# Patient Record
Sex: MALE | Race: WHITE | NOT HISPANIC OR LATINO | Employment: FULL TIME | URBAN - METROPOLITAN AREA
[De-identification: names, ages, dates, MRNs, and addresses within clinical notes are randomized per-mention and may not be internally consistent; named-entity substitution may affect disease eponyms.]

---

## 2017-01-06 ENCOUNTER — COMMUNICATION - HEALTHEAST (OUTPATIENT)
Dept: INTERNAL MEDICINE | Facility: CLINIC | Age: 57
End: 2017-01-06

## 2017-01-06 DIAGNOSIS — E11.9 TYPE 2 DIABETES MELLITUS (H): ICD-10-CM

## 2017-04-11 ENCOUNTER — COMMUNICATION - HEALTHEAST (OUTPATIENT)
Dept: INTERNAL MEDICINE | Facility: CLINIC | Age: 57
End: 2017-04-11

## 2017-04-11 DIAGNOSIS — E11.9 TYPE 2 DIABETES MELLITUS (H): ICD-10-CM

## 2017-04-18 ENCOUNTER — COMMUNICATION - HEALTHEAST (OUTPATIENT)
Dept: INTERNAL MEDICINE | Facility: CLINIC | Age: 57
End: 2017-04-18

## 2017-05-09 ENCOUNTER — OFFICE VISIT - HEALTHEAST (OUTPATIENT)
Dept: INTERNAL MEDICINE | Facility: CLINIC | Age: 57
End: 2017-05-09

## 2017-05-09 ENCOUNTER — RECORDS - HEALTHEAST (OUTPATIENT)
Dept: ADMINISTRATIVE | Facility: OTHER | Age: 57
End: 2017-05-09

## 2017-05-09 DIAGNOSIS — E11.9 DIABETES (H): ICD-10-CM

## 2017-05-09 DIAGNOSIS — F17.200 TOBACCO USE DISORDER: ICD-10-CM

## 2017-05-09 DIAGNOSIS — E11.9 TYPE 2 DIABETES MELLITUS (H): ICD-10-CM

## 2017-05-09 DIAGNOSIS — E87.5 HYPERKALEMIA: ICD-10-CM

## 2017-05-09 LAB — HBA1C MFR BLD: 9.9 % (ref 3.5–6)

## 2017-05-10 ENCOUNTER — COMMUNICATION - HEALTHEAST (OUTPATIENT)
Dept: INTERNAL MEDICINE | Facility: CLINIC | Age: 57
End: 2017-05-10

## 2017-08-04 ENCOUNTER — COMMUNICATION - HEALTHEAST (OUTPATIENT)
Dept: INTERNAL MEDICINE | Facility: CLINIC | Age: 57
End: 2017-08-04

## 2017-08-04 DIAGNOSIS — F41.9 ANXIETY: ICD-10-CM

## 2017-10-01 ENCOUNTER — COMMUNICATION - HEALTHEAST (OUTPATIENT)
Dept: INTERNAL MEDICINE | Facility: CLINIC | Age: 57
End: 2017-10-01

## 2017-10-01 DIAGNOSIS — F41.9 ANXIETY: ICD-10-CM

## 2017-10-16 ENCOUNTER — COMMUNICATION - HEALTHEAST (OUTPATIENT)
Dept: INTERNAL MEDICINE | Facility: CLINIC | Age: 57
End: 2017-10-16

## 2017-10-16 DIAGNOSIS — F41.9 ANXIETY: ICD-10-CM

## 2018-01-09 ENCOUNTER — COMMUNICATION - HEALTHEAST (OUTPATIENT)
Dept: INTERNAL MEDICINE | Facility: CLINIC | Age: 58
End: 2018-01-09

## 2018-01-09 DIAGNOSIS — F41.9 ANXIETY: ICD-10-CM

## 2018-01-23 ENCOUNTER — OFFICE VISIT - HEALTHEAST (OUTPATIENT)
Dept: INTERNAL MEDICINE | Facility: CLINIC | Age: 58
End: 2018-01-23

## 2018-01-23 DIAGNOSIS — Z71.6 ENCOUNTER FOR SMOKING CESSATION COUNSELING: ICD-10-CM

## 2018-01-23 DIAGNOSIS — Z51.81 MEDICATION MONITORING ENCOUNTER: ICD-10-CM

## 2018-01-23 DIAGNOSIS — F41.9 ANXIETY: ICD-10-CM

## 2018-01-23 DIAGNOSIS — F17.200 TOBACCO USE DISORDER: ICD-10-CM

## 2018-01-23 DIAGNOSIS — E78.5 HYPERLIPIDEMIA: ICD-10-CM

## 2018-01-23 DIAGNOSIS — E11.9 TYPE 2 DIABETES MELLITUS (H): ICD-10-CM

## 2018-01-23 DIAGNOSIS — F41.1 ANXIETY STATE: ICD-10-CM

## 2018-01-23 LAB
ALBUMIN SERPL-MCNC: 3.7 G/DL (ref 3.5–5)
ALP SERPL-CCNC: 89 U/L (ref 45–120)
ALT SERPL W P-5'-P-CCNC: 17 U/L (ref 0–45)
AMPHETAMINES UR QL SCN: NORMAL
ANION GAP SERPL CALCULATED.3IONS-SCNC: 10 MMOL/L (ref 5–18)
AST SERPL W P-5'-P-CCNC: 16 U/L (ref 0–40)
BARBITURATES UR QL: NORMAL
BENZODIAZ UR QL: NORMAL
BILIRUB SERPL-MCNC: 0.4 MG/DL (ref 0–1)
BUN SERPL-MCNC: 17 MG/DL (ref 8–22)
CALCIUM SERPL-MCNC: 8.9 MG/DL (ref 8.5–10.5)
CANNABINOIDS UR QL SCN: NORMAL
CHLORIDE BLD-SCNC: 107 MMOL/L (ref 98–107)
CHOLEST SERPL-MCNC: 124 MG/DL
CO2 SERPL-SCNC: 22 MMOL/L (ref 22–31)
COCAINE UR QL: NORMAL
CREAT SERPL-MCNC: 0.77 MG/DL (ref 0.7–1.3)
CREAT UR-MCNC: 111.2 MG/DL
CREAT UR-MCNC: 111.2 MG/DL
FASTING STATUS PATIENT QL REPORTED: ABNORMAL
GFR SERPL CREATININE-BSD FRML MDRD: >60 ML/MIN/1.73M2
GLUCOSE BLD-MCNC: 186 MG/DL (ref 70–125)
HDLC SERPL-MCNC: 44 MG/DL
LDLC SERPL CALC-MCNC: 28 MG/DL
MICROALBUMIN UR-MCNC: 0.87 MG/DL (ref 0–1.99)
MICROALBUMIN/CREAT UR: 7.8 MG/G
OPIATES UR QL SCN: NORMAL
OXYCODONE UR QL: NORMAL
PCP UR QL SCN: NORMAL
POTASSIUM BLD-SCNC: 4.3 MMOL/L (ref 3.5–5)
PROT SERPL-MCNC: 6.4 G/DL (ref 6–8)
SODIUM SERPL-SCNC: 139 MMOL/L (ref 136–145)
TRIGL SERPL-MCNC: 260 MG/DL

## 2018-01-24 ENCOUNTER — AMBULATORY - HEALTHEAST (OUTPATIENT)
Dept: LAB | Facility: CLINIC | Age: 58
End: 2018-01-24

## 2018-01-24 ENCOUNTER — COMMUNICATION - HEALTHEAST (OUTPATIENT)
Dept: LAB | Facility: CLINIC | Age: 58
End: 2018-01-24

## 2018-01-24 DIAGNOSIS — E11.9 TYPE 2 DIABETES MELLITUS (H): ICD-10-CM

## 2018-01-24 LAB — HBA1C MFR BLD: 7.7 % (ref 3.5–6)

## 2018-01-25 ENCOUNTER — COMMUNICATION - HEALTHEAST (OUTPATIENT)
Dept: INTERNAL MEDICINE | Facility: CLINIC | Age: 58
End: 2018-01-25

## 2018-04-04 ENCOUNTER — RECORDS - HEALTHEAST (OUTPATIENT)
Dept: ADMINISTRATIVE | Facility: OTHER | Age: 58
End: 2018-04-04

## 2018-04-06 ENCOUNTER — RECORDS - HEALTHEAST (OUTPATIENT)
Dept: ADMINISTRATIVE | Facility: OTHER | Age: 58
End: 2018-04-06

## 2018-04-27 ENCOUNTER — COMMUNICATION - HEALTHEAST (OUTPATIENT)
Dept: INTERNAL MEDICINE | Facility: CLINIC | Age: 58
End: 2018-04-27

## 2018-04-27 DIAGNOSIS — E11.9 TYPE 2 DIABETES MELLITUS (H): ICD-10-CM

## 2018-05-08 ENCOUNTER — COMMUNICATION - HEALTHEAST (OUTPATIENT)
Dept: FAMILY MEDICINE | Facility: CLINIC | Age: 58
End: 2018-05-08

## 2018-08-22 ENCOUNTER — COMMUNICATION - HEALTHEAST (OUTPATIENT)
Dept: INTERNAL MEDICINE | Facility: CLINIC | Age: 58
End: 2018-08-22

## 2018-09-05 ENCOUNTER — COMMUNICATION - HEALTHEAST (OUTPATIENT)
Dept: INTERNAL MEDICINE | Facility: CLINIC | Age: 58
End: 2018-09-05

## 2018-09-05 DIAGNOSIS — F41.9 ANXIETY: ICD-10-CM

## 2018-09-24 ENCOUNTER — OFFICE VISIT - HEALTHEAST (OUTPATIENT)
Dept: INTERNAL MEDICINE | Facility: CLINIC | Age: 58
End: 2018-09-24

## 2018-09-24 DIAGNOSIS — E78.5 HYPERLIPIDEMIA: ICD-10-CM

## 2018-09-24 DIAGNOSIS — Z12.11 COLON CANCER SCREENING: ICD-10-CM

## 2018-09-24 DIAGNOSIS — E11.9 TYPE 2 DIABETES MELLITUS (H): ICD-10-CM

## 2018-09-24 DIAGNOSIS — R19.7 DIARRHEA: ICD-10-CM

## 2018-09-24 DIAGNOSIS — F17.200 TOBACCO USE DISORDER: ICD-10-CM

## 2018-09-24 DIAGNOSIS — Z79.899 CONTROLLED SUBSTANCE AGREEMENT SIGNED: ICD-10-CM

## 2018-09-24 DIAGNOSIS — F41.1 ANXIETY STATE: ICD-10-CM

## 2018-09-24 LAB — HBA1C MFR BLD: 7.3 % (ref 3.5–6)

## 2018-09-24 RX ORDER — GLUCOSAMINE HCL/CHONDROITIN SU 500-400 MG
CAPSULE ORAL
Qty: 100 STRIP | Refills: 11 | Status: SHIPPED | OUTPATIENT
Start: 2018-09-24 | End: 2021-12-09

## 2018-09-26 ENCOUNTER — AMBULATORY - HEALTHEAST (OUTPATIENT)
Dept: INTERNAL MEDICINE | Facility: CLINIC | Age: 58
End: 2018-09-26

## 2018-09-26 ENCOUNTER — COMMUNICATION - HEALTHEAST (OUTPATIENT)
Dept: INTERNAL MEDICINE | Facility: CLINIC | Age: 58
End: 2018-09-26

## 2018-11-20 ENCOUNTER — COMMUNICATION - HEALTHEAST (OUTPATIENT)
Dept: PEDIATRICS | Facility: CLINIC | Age: 58
End: 2018-11-20

## 2018-12-27 ENCOUNTER — COMMUNICATION - HEALTHEAST (OUTPATIENT)
Dept: INTERNAL MEDICINE | Facility: CLINIC | Age: 58
End: 2018-12-27

## 2019-01-19 ENCOUNTER — COMMUNICATION - HEALTHEAST (OUTPATIENT)
Dept: INTERNAL MEDICINE | Facility: CLINIC | Age: 59
End: 2019-01-19

## 2019-01-19 DIAGNOSIS — E11.9 TYPE 2 DIABETES MELLITUS (H): ICD-10-CM

## 2019-03-13 ENCOUNTER — COMMUNICATION - HEALTHEAST (OUTPATIENT)
Dept: INTERNAL MEDICINE | Facility: CLINIC | Age: 59
End: 2019-03-13

## 2019-03-13 DIAGNOSIS — E11.9 TYPE 2 DIABETES MELLITUS (H): ICD-10-CM

## 2019-04-01 ENCOUNTER — AMBULATORY - HEALTHEAST (OUTPATIENT)
Dept: MULTI SPECIALTY CLINIC | Facility: CLINIC | Age: 59
End: 2019-04-01

## 2019-04-01 ENCOUNTER — OFFICE VISIT - HEALTHEAST (OUTPATIENT)
Dept: INTERNAL MEDICINE | Facility: CLINIC | Age: 59
End: 2019-04-01

## 2019-04-01 DIAGNOSIS — Z51.81 MEDICATION MONITORING ENCOUNTER: ICD-10-CM

## 2019-04-01 DIAGNOSIS — Z12.11 COLON CANCER SCREENING: ICD-10-CM

## 2019-04-01 DIAGNOSIS — E78.2 MIXED HYPERLIPIDEMIA: ICD-10-CM

## 2019-04-01 DIAGNOSIS — F41.1 ANXIETY STATE: ICD-10-CM

## 2019-04-01 DIAGNOSIS — F17.200 TOBACCO USE DISORDER: ICD-10-CM

## 2019-04-01 DIAGNOSIS — E11.65 TYPE 2 DIABETES MELLITUS WITH HYPERGLYCEMIA, WITHOUT LONG-TERM CURRENT USE OF INSULIN (H): ICD-10-CM

## 2019-04-01 DIAGNOSIS — J30.1 SEASONAL ALLERGIC RHINITIS DUE TO POLLEN: ICD-10-CM

## 2019-04-01 LAB
ALBUMIN SERPL-MCNC: 3.9 G/DL (ref 3.5–5)
ALP SERPL-CCNC: 100 U/L (ref 45–120)
ALT SERPL W P-5'-P-CCNC: 23 U/L (ref 0–45)
AMPHETAMINES UR QL SCN: NORMAL
ANION GAP SERPL CALCULATED.3IONS-SCNC: 12 MMOL/L (ref 5–18)
AST SERPL W P-5'-P-CCNC: 17 U/L (ref 0–40)
BARBITURATES UR QL: NORMAL
BENZODIAZ UR QL: NORMAL
BILIRUB SERPL-MCNC: 0.3 MG/DL (ref 0–1)
BUN SERPL-MCNC: 11 MG/DL (ref 8–22)
CALCIUM SERPL-MCNC: 9.4 MG/DL (ref 8.5–10.5)
CANNABINOIDS UR QL SCN: NORMAL
CHLORIDE BLD-SCNC: 106 MMOL/L (ref 98–107)
CHOLEST SERPL-MCNC: 211 MG/DL
CO2 SERPL-SCNC: 21 MMOL/L (ref 22–31)
COCAINE UR QL: NORMAL
CREAT SERPL-MCNC: 0.79 MG/DL (ref 0.7–1.3)
CREAT UR-MCNC: 105.1 MG/DL
CREAT UR-MCNC: 105.1 MG/DL
FASTING STATUS PATIENT QL REPORTED: NO
GFR SERPL CREATININE-BSD FRML MDRD: >60 ML/MIN/1.73M2
GLUCOSE BLD-MCNC: 216 MG/DL (ref 70–125)
HBA1C MFR BLD: 8 % (ref 3.5–6)
HDLC SERPL-MCNC: 51 MG/DL
LDLC SERPL CALC-MCNC: ABNORMAL MG/DL
MICROALBUMIN UR-MCNC: 1.11 MG/DL (ref 0–1.99)
MICROALBUMIN/CREAT UR: 10.6 MG/G
OPIATES UR QL SCN: NORMAL
OXYCODONE UR QL: NORMAL
PCP UR QL SCN: NORMAL
POTASSIUM BLD-SCNC: 4.5 MMOL/L (ref 3.5–5)
PROT SERPL-MCNC: 6.7 G/DL (ref 6–8)
RETINOPATHY: NORMAL
RETINOPATHY: NORMAL
SODIUM SERPL-SCNC: 139 MMOL/L (ref 136–145)
TRIGL SERPL-MCNC: 461 MG/DL

## 2019-04-01 RX ORDER — GLUCOSAMINE HCL/CHONDROITIN SU 500-400 MG
CAPSULE ORAL
Qty: 100 STRIP | Refills: 11 | Status: SHIPPED | OUTPATIENT
Start: 2019-04-01 | End: 2021-12-09

## 2019-04-04 ENCOUNTER — COMMUNICATION - HEALTHEAST (OUTPATIENT)
Dept: INTERNAL MEDICINE | Facility: CLINIC | Age: 59
End: 2019-04-04

## 2019-06-04 ENCOUNTER — RECORDS - HEALTHEAST (OUTPATIENT)
Dept: ADMINISTRATIVE | Facility: OTHER | Age: 59
End: 2019-06-04

## 2019-07-11 ENCOUNTER — COMMUNICATION - HEALTHEAST (OUTPATIENT)
Dept: INTERNAL MEDICINE | Facility: CLINIC | Age: 59
End: 2019-07-11

## 2019-07-11 DIAGNOSIS — E11.65 TYPE 2 DIABETES MELLITUS WITH HYPERGLYCEMIA, WITHOUT LONG-TERM CURRENT USE OF INSULIN (H): ICD-10-CM

## 2019-07-23 ENCOUNTER — COMMUNICATION - HEALTHEAST (OUTPATIENT)
Dept: INTERNAL MEDICINE | Facility: CLINIC | Age: 59
End: 2019-07-23

## 2019-07-23 DIAGNOSIS — F41.1 ANXIETY STATE: ICD-10-CM

## 2019-10-08 ENCOUNTER — OFFICE VISIT - HEALTHEAST (OUTPATIENT)
Dept: INTERNAL MEDICINE | Facility: CLINIC | Age: 59
End: 2019-10-08

## 2019-10-08 ENCOUNTER — COMMUNICATION - HEALTHEAST (OUTPATIENT)
Dept: INTERNAL MEDICINE | Facility: CLINIC | Age: 59
End: 2019-10-08

## 2019-10-08 DIAGNOSIS — F17.200 TOBACCO USE DISORDER: ICD-10-CM

## 2019-10-08 DIAGNOSIS — E11.9 TYPE 2 DIABETES MELLITUS WITHOUT COMPLICATION, WITHOUT LONG-TERM CURRENT USE OF INSULIN (H): ICD-10-CM

## 2019-10-08 DIAGNOSIS — H93.13 TINNITUS, BILATERAL: ICD-10-CM

## 2019-10-08 DIAGNOSIS — Z79.899 CONTROLLED SUBSTANCE AGREEMENT SIGNED: ICD-10-CM

## 2019-10-08 DIAGNOSIS — F41.1 ANXIETY STATE: ICD-10-CM

## 2019-10-08 LAB — HBA1C MFR BLD: 6.4 % (ref 3.5–6)

## 2019-10-22 ENCOUNTER — COMMUNICATION - HEALTHEAST (OUTPATIENT)
Dept: INTERNAL MEDICINE | Facility: CLINIC | Age: 59
End: 2019-10-22

## 2019-10-22 DIAGNOSIS — E11.65 TYPE 2 DIABETES MELLITUS WITH HYPERGLYCEMIA, WITHOUT LONG-TERM CURRENT USE OF INSULIN (H): ICD-10-CM

## 2019-11-27 ENCOUNTER — COMMUNICATION - HEALTHEAST (OUTPATIENT)
Dept: INTERNAL MEDICINE | Facility: CLINIC | Age: 59
End: 2019-11-27

## 2019-11-27 DIAGNOSIS — F41.1 ANXIETY STATE: ICD-10-CM

## 2020-02-26 ENCOUNTER — COMMUNICATION - HEALTHEAST (OUTPATIENT)
Dept: TELEHEALTH | Facility: CLINIC | Age: 60
End: 2020-02-26

## 2020-02-26 ENCOUNTER — OFFICE VISIT - HEALTHEAST (OUTPATIENT)
Dept: INTERNAL MEDICINE | Facility: CLINIC | Age: 60
End: 2020-02-26

## 2020-02-26 ENCOUNTER — SURGERY - HEALTHEAST (OUTPATIENT)
Dept: SURGERY | Facility: HOSPITAL | Age: 60
End: 2020-02-26

## 2020-02-26 ENCOUNTER — ANESTHESIA - HEALTHEAST (OUTPATIENT)
Dept: SURGERY | Facility: HOSPITAL | Age: 60
End: 2020-02-26

## 2020-02-26 DIAGNOSIS — L08.9 LOCAL INFECTION OF SKIN AND SUBCUTANEOUS TISSUE: ICD-10-CM

## 2020-02-26 ASSESSMENT — MIFFLIN-ST. JEOR
SCORE: 1726.63
SCORE: 1733.4

## 2020-03-09 ENCOUNTER — OFFICE VISIT - HEALTHEAST (OUTPATIENT)
Dept: INTERNAL MEDICINE | Facility: CLINIC | Age: 60
End: 2020-03-09

## 2020-03-09 DIAGNOSIS — M00.9 PYOGENIC ARTHRITIS OF RIGHT HAND, DUE TO UNSPECIFIED ORGANISM (H): ICD-10-CM

## 2020-03-09 DIAGNOSIS — Z09 HOSPITAL DISCHARGE FOLLOW-UP: ICD-10-CM
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
## 2020-03-09 DIAGNOSIS — S61.451A CAT BITE OF HAND, RIGHT, INITIAL ENCOUNTER: ICD-10-CM

## 2020-04-21 ENCOUNTER — COMMUNICATION - HEALTHEAST (OUTPATIENT)
Dept: INTERNAL MEDICINE | Facility: CLINIC | Age: 60
End: 2020-04-21

## 2020-04-21 DIAGNOSIS — E11.65 TYPE 2 DIABETES MELLITUS WITH HYPERGLYCEMIA, WITHOUT LONG-TERM CURRENT USE OF INSULIN (H): ICD-10-CM

## 2020-06-02 ENCOUNTER — COMMUNICATION - HEALTHEAST (OUTPATIENT)
Dept: INTERNAL MEDICINE | Facility: CLINIC | Age: 60
End: 2020-06-02

## 2020-06-02 DIAGNOSIS — F41.1 ANXIETY STATE: ICD-10-CM

## 2020-09-10 ENCOUNTER — COMMUNICATION - HEALTHEAST (OUTPATIENT)
Dept: INTERNAL MEDICINE | Facility: CLINIC | Age: 60
End: 2020-09-10

## 2020-09-10 DIAGNOSIS — F41.1 ANXIETY STATE: ICD-10-CM

## 2020-09-13 ENCOUNTER — COMMUNICATION - HEALTHEAST (OUTPATIENT)
Dept: INTERNAL MEDICINE | Facility: CLINIC | Age: 60
End: 2020-09-13

## 2020-09-13 DIAGNOSIS — F41.1 ANXIETY STATE: ICD-10-CM

## 2020-10-05 ENCOUNTER — OFFICE VISIT - HEALTHEAST (OUTPATIENT)
Dept: INTERNAL MEDICINE | Facility: CLINIC | Age: 60
End: 2020-10-05

## 2020-10-05 DIAGNOSIS — E78.5 HYPERLIPIDEMIA, UNSPECIFIED HYPERLIPIDEMIA TYPE: ICD-10-CM

## 2020-10-05 DIAGNOSIS — I10 ESSENTIAL HYPERTENSION, BENIGN: ICD-10-CM

## 2020-10-05 DIAGNOSIS — F17.200 TOBACCO USE DISORDER: ICD-10-CM

## 2020-10-05 DIAGNOSIS — F41.1 ANXIETY STATE: ICD-10-CM

## 2020-10-05 DIAGNOSIS — Z51.81 ENCOUNTER FOR THERAPEUTIC DRUG MONITORING: ICD-10-CM

## 2020-10-05 DIAGNOSIS — E11.65 TYPE 2 DIABETES MELLITUS WITH HYPERGLYCEMIA, WITHOUT LONG-TERM CURRENT USE OF INSULIN (H): ICD-10-CM

## 2020-10-05 LAB
ALT SERPL W P-5'-P-CCNC: 17 U/L (ref 0–45)
AMPHETAMINES UR QL SCN: NORMAL
BARBITURATES UR QL: NORMAL
BENZODIAZ UR QL: NORMAL
CANNABINOIDS UR QL SCN: NORMAL
CHOLEST SERPL-MCNC: 219 MG/DL
COCAINE UR QL: NORMAL
CREAT UR-MCNC: 182.5 MG/DL
CREAT UR-MCNC: 182.5 MG/DL
FASTING STATUS PATIENT QL REPORTED: NO
HBA1C MFR BLD: 6.7 %
HDLC SERPL-MCNC: 46 MG/DL
LDLC SERPL CALC-MCNC: 122 MG/DL
MICROALBUMIN UR-MCNC: 3.36 MG/DL (ref 0–1.99)
MICROALBUMIN/CREAT UR: 18.4 MG/G
OPIATES UR QL SCN: NORMAL
OXYCODONE UR QL: NORMAL
PCP UR QL SCN: NORMAL
TRIGL SERPL-MCNC: 256 MG/DL

## 2020-10-05 RX ORDER — ESCITALOPRAM OXALATE 20 MG/1
20 TABLET ORAL DAILY
Qty: 90 TABLET | Refills: 1 | Status: SHIPPED | OUTPATIENT
Start: 2020-10-05 | End: 2022-06-04

## 2020-10-06 ENCOUNTER — COMMUNICATION - HEALTHEAST (OUTPATIENT)
Dept: INTERNAL MEDICINE | Facility: CLINIC | Age: 60
End: 2020-10-06

## 2020-10-06 DIAGNOSIS — E78.5 HYPERLIPIDEMIA, UNSPECIFIED HYPERLIPIDEMIA TYPE: ICD-10-CM

## 2020-10-06 DIAGNOSIS — E11.29 TYPE 2 DIABETES MELLITUS WITH MICROALBUMINURIA, WITHOUT LONG-TERM CURRENT USE OF INSULIN (H): ICD-10-CM

## 2020-10-06 DIAGNOSIS — R80.9 TYPE 2 DIABETES MELLITUS WITH MICROALBUMINURIA, WITHOUT LONG-TERM CURRENT USE OF INSULIN (H): ICD-10-CM

## 2020-10-06 RX ORDER — LISINOPRIL 2.5 MG/1
2.5 TABLET ORAL DAILY
Qty: 90 TABLET | Refills: 3 | Status: SHIPPED | OUTPATIENT
Start: 2020-10-06 | End: 2021-10-11

## 2020-10-27 ENCOUNTER — COMMUNICATION - HEALTHEAST (OUTPATIENT)
Dept: INTERNAL MEDICINE | Facility: CLINIC | Age: 60
End: 2020-10-27

## 2020-10-27 DIAGNOSIS — E11.65 TYPE 2 DIABETES MELLITUS WITH HYPERGLYCEMIA, WITHOUT LONG-TERM CURRENT USE OF INSULIN (H): ICD-10-CM

## 2020-12-15 ENCOUNTER — COMMUNICATION - HEALTHEAST (OUTPATIENT)
Dept: INTERNAL MEDICINE | Facility: CLINIC | Age: 60
End: 2020-12-15

## 2020-12-15 DIAGNOSIS — F41.1 ANXIETY STATE: ICD-10-CM

## 2020-12-15 DIAGNOSIS — E11.65 TYPE 2 DIABETES MELLITUS WITH HYPERGLYCEMIA, WITHOUT LONG-TERM CURRENT USE OF INSULIN (H): ICD-10-CM

## 2020-12-18 RX ORDER — LORAZEPAM 0.5 MG/1
TABLET ORAL
Qty: 15 TABLET | Refills: 0 | Status: SHIPPED | OUTPATIENT
Start: 2020-12-18 | End: 2023-10-09

## 2020-12-19 ENCOUNTER — COMMUNICATION - HEALTHEAST (OUTPATIENT)
Dept: INTERNAL MEDICINE | Facility: CLINIC | Age: 60
End: 2020-12-19

## 2020-12-19 DIAGNOSIS — E11.65 TYPE 2 DIABETES MELLITUS WITH HYPERGLYCEMIA, WITHOUT LONG-TERM CURRENT USE OF INSULIN (H): ICD-10-CM

## 2021-05-03 ENCOUNTER — COMMUNICATION - HEALTHEAST (OUTPATIENT)
Dept: ADMINISTRATIVE | Facility: CLINIC | Age: 61
End: 2021-05-03

## 2021-05-03 DIAGNOSIS — E11.65 TYPE 2 DIABETES MELLITUS WITH HYPERGLYCEMIA, WITHOUT LONG-TERM CURRENT USE OF INSULIN (H): ICD-10-CM

## 2021-05-12 ENCOUNTER — OFFICE VISIT - HEALTHEAST (OUTPATIENT)
Dept: INTERNAL MEDICINE | Facility: CLINIC | Age: 61
End: 2021-05-12

## 2021-05-12 DIAGNOSIS — I10 ESSENTIAL HYPERTENSION, BENIGN: ICD-10-CM

## 2021-05-12 DIAGNOSIS — E11.29 TYPE 2 DIABETES MELLITUS WITH MICROALBUMINURIA, WITHOUT LONG-TERM CURRENT USE OF INSULIN (H): ICD-10-CM

## 2021-05-12 DIAGNOSIS — R80.9 TYPE 2 DIABETES MELLITUS WITH MICROALBUMINURIA, WITHOUT LONG-TERM CURRENT USE OF INSULIN (H): ICD-10-CM

## 2021-05-12 DIAGNOSIS — F17.200 TOBACCO USE DISORDER: ICD-10-CM

## 2021-05-12 DIAGNOSIS — E78.5 HYPERLIPIDEMIA, UNSPECIFIED HYPERLIPIDEMIA TYPE: ICD-10-CM

## 2021-05-12 LAB
ANION GAP SERPL CALCULATED.3IONS-SCNC: 13 MMOL/L (ref 5–18)
BUN SERPL-MCNC: 12 MG/DL (ref 8–22)
CALCIUM SERPL-MCNC: 8.9 MG/DL (ref 8.5–10.5)
CHLORIDE BLD-SCNC: 107 MMOL/L (ref 98–107)
CO2 SERPL-SCNC: 21 MMOL/L (ref 22–31)
CREAT SERPL-MCNC: 0.84 MG/DL (ref 0.7–1.3)
GFR SERPL CREATININE-BSD FRML MDRD: >60 ML/MIN/1.73M2
GLUCOSE BLD-MCNC: 124 MG/DL (ref 70–125)
HBA1C MFR BLD: 7.4 %
POTASSIUM BLD-SCNC: 4 MMOL/L (ref 3.5–5)
SODIUM SERPL-SCNC: 141 MMOL/L (ref 136–145)

## 2021-05-12 RX ORDER — GLIPIZIDE 10 MG/1
20 TABLET, FILM COATED, EXTENDED RELEASE ORAL DAILY
Qty: 180 TABLET | Refills: 1 | Status: SHIPPED | OUTPATIENT
Start: 2021-05-12 | End: 2021-12-06

## 2021-05-12 RX ORDER — ATORVASTATIN CALCIUM 20 MG/1
20 TABLET, FILM COATED ORAL DAILY
Qty: 90 TABLET | Refills: 3 | Status: SHIPPED | OUTPATIENT
Start: 2021-05-12 | End: 2021-10-11

## 2021-05-12 RX ORDER — NICOTINE 21 MG/24HR
1 PATCH, TRANSDERMAL 24 HOURS TRANSDERMAL EVERY 24 HOURS
Qty: 30 PATCH | Refills: 0 | Status: SHIPPED | OUTPATIENT
Start: 2021-05-12 | End: 2022-07-20

## 2021-05-12 ASSESSMENT — MIFFLIN-ST. JEOR: SCORE: 1715.26

## 2021-05-13 ENCOUNTER — COMMUNICATION - HEALTHEAST (OUTPATIENT)
Dept: INTERNAL MEDICINE | Facility: CLINIC | Age: 61
End: 2021-05-13

## 2021-05-13 DIAGNOSIS — E11.29 TYPE 2 DIABETES MELLITUS WITH MICROALBUMINURIA, WITHOUT LONG-TERM CURRENT USE OF INSULIN (H): ICD-10-CM

## 2021-05-13 DIAGNOSIS — R80.9 TYPE 2 DIABETES MELLITUS WITH MICROALBUMINURIA, WITHOUT LONG-TERM CURRENT USE OF INSULIN (H): ICD-10-CM

## 2021-05-18 ENCOUNTER — COMMUNICATION - HEALTHEAST (OUTPATIENT)
Dept: INTERNAL MEDICINE | Facility: CLINIC | Age: 61
End: 2021-05-18

## 2021-05-18 DIAGNOSIS — R80.9 TYPE 2 DIABETES MELLITUS WITH MICROALBUMINURIA, WITHOUT LONG-TERM CURRENT USE OF INSULIN (H): ICD-10-CM

## 2021-05-18 DIAGNOSIS — E11.29 TYPE 2 DIABETES MELLITUS WITH MICROALBUMINURIA, WITHOUT LONG-TERM CURRENT USE OF INSULIN (H): ICD-10-CM

## 2021-05-20 RX ORDER — DULAGLUTIDE 0.75 MG/.5ML
0.75 INJECTION, SOLUTION SUBCUTANEOUS
Qty: 2 ML | Refills: 5 | Status: SHIPPED | OUTPATIENT
Start: 2021-05-20 | End: 2021-11-16

## 2021-05-27 ENCOUNTER — RECORDS - HEALTHEAST (OUTPATIENT)
Dept: ADMINISTRATIVE | Facility: CLINIC | Age: 61
End: 2021-05-27

## 2021-05-27 VITALS
BODY MASS INDEX: 33.27 KG/M2 | SYSTOLIC BLOOD PRESSURE: 120 MMHG | HEIGHT: 67 IN | WEIGHT: 212 LBS | DIASTOLIC BLOOD PRESSURE: 74 MMHG | HEART RATE: 66 BPM

## 2021-05-27 NOTE — PATIENT INSTRUCTIONS - HE
Check with your insurance to see if Cologuard is covered      The new shingles shot (Shingrix) is 2 separate shots  by 2-6 months.    The cost out of pocket is around $390 for the 2 shots, so you might want to see if your insurance covers it or a portion of it prior to having it done.  Often by having it done at a pharmacy rather than a clinic, it can be cheaper for you. I recommend that you check on the cost through your insurance or talk to your pharmacist about the cost of the vaccine.     It is estimated that any person's risk of shingles over their lifetime is around 10-20%.    The old shingles vaccine (Zostavax) is about 50% effective, reducing your risk of shingles to about 5-10% over your lifetime.    The new shot is 90% effective, reducing your risk of shingles to about 1-2% over your lifetime.    Because the shot is strong, it is very common to have flu like symptoms for 2-3 days after the shot.  25-50% of patients will have fever, muscle aches or headache.

## 2021-05-27 NOTE — TELEPHONE ENCOUNTER
----- Message from KOTA Saunders sent at 4/2/2019  3:18 PM CDT -----  Call patient: his A1C is 8.0% now. I am going to call in Actos which we had discussed in his office visit. This is taken once per day. He should monitor for swelling in the lower legs and let me know if this occurs. Because his A1C has increased, I would prefer to see him back in 3 months rather than in 6 months-- please change his appointment. Electrolytes are normal. Kidney function is normal. Cholesterol is high but I suspect this is in part related to your blood sugars being higher. There is no excess protein in your urine test.

## 2021-05-27 NOTE — TELEPHONE ENCOUNTER
Left message to call back. Please relay Rosie's information below and r/s pt for 3 month follow up

## 2021-05-27 NOTE — PROGRESS NOTES
Internal Medicine Office Visit  [facility]  Patient Name: Remi Mathias  Patient Age: 59 y.o.  YOB: 1960  MRN: [id number]    Date of Visit: 2019  Reason for Office Visit:   Chief Complaint   Patient presents with     Follow-up     6 month f/u           Assessment / Plan / Medical Decision Makin. Type 2 diabetes mellitus with hyperglycemia, without long-term current use of insulin (H)  - A1C likely worse due to metformin intolerance and reduced dosing of this medication  - START: pioglitazone (ACTOS) 30 MG tablet; Take 1 tablet (30 mg total) by mouth daily.  Dispense: 90 tablet; Refill: 0  - DISCONTINUE: metformin d/t side effect     2. Anxiety, insomnia   - No changes  - UDS updated today  - LORazepam (ATIVAN) 0.5 MG tablet; Take 1 tablet (0.5 mg total) by mouth daily as needed for anxiety.  Dispense: 15 tablet; Refill: 0  - escitalopram oxalate (LEXAPRO) 20 MG tablet; Take 1 tablet (20 mg total) by mouth daily.  Dispense: 90 tablet; Refill: 1    4. Nicotine Dependence  - declines cessation information     5. Mixed hyperlipidemia  - Lipid Profile  - atorvastatin (LIPITOR) 40 MG tablet; Take 1 tablet (40 mg total) by mouth daily.  Dispense: 90 tablet; Refill: 3    6. Colon cancer screening  - Cologuard reviewed as an option today  - he is advised to check with insurance regarding coverage     7. Seasonal allergic rhinitis due to pollen  - fluticasone propionate (FLONASE) 50 mcg/actuation nasal spray; 1 spray into each nostril daily.  Dispense: 16 g; Refill: 2      - Shingrix vaccine recommended, he will check on cost     Health Maintenance Review  Health Maintenance   Topic Date Due     DIABETES OPHTHALMOLOGY EXAM  1968     ZOSTER VACCINES (1 of 2) 2008     TD 18+ HE  2019 (Originally 2019)     DIABETES HEMOGLOBIN A1C  10/01/2019     DIABETES FOLLOW-UP  10/01/2019     DIABETES FOOT EXAM  2020     DIABETES URINE  MICROALBUMIN  04/01/2020     ADVANCE DIRECTIVES DISCUSSED WITH PATIENT  10/25/2021     COLONOSCOPY  10/25/2026     INFLUENZA VACCINE RULE BASED  Completed     TDAP ADULT ONE TIME DOSE  Completed         I have discontinued Mahendra Mathias's metFORMIN and metFORMIN. I have also changed his blood glucose test, LORazepam, atorvastatin, and fluticasone propionate. Additionally, I am having him start on pioglitazone. Lastly, I am having him maintain his blood-glucose meter, blood glucose test, aspirin, glipiZIDE, and escitalopram oxalate.      HPI:  Remi Mathias is a 59 y.o. year old who presents to the office today for follow up of chronic medical conditions.     Diabetes was reviewed. From memory his readings have been between 140-160 both fasting and after eating. He is taking metformin every other day due to loose stools associated with the medication.     Smoking reviewed, he is smoking less than 1 pack per day and cutting back.     Allergic rhinitis is worse in the spring, he uses flonase for this.     Anxiety has been well controlled with escitalopram, no concerns. He takes lorazepam sparingly as needed for anxiety.     Review of Systems- pertinent positive in bold:  A 10-point ROS is negative except as stated in HPI       Current Scheduled Meds:  Outpatient Encounter Medications as of 4/1/2019   Medication Sig Dispense Refill     aspirin 81 MG EC tablet Take 81 mg by mouth daily.       atorvastatin (LIPITOR) 40 MG tablet Take 1 tablet (40 mg total) by mouth daily. 90 tablet 3     blood glucose test strips Check blood sugar twice daily. Dispense brand per patient's insurance at pharmacy discretion. 100 strip 11     blood glucose test strips Check 1-2 times per day. Dx:e11.9 100 strip 11     blood-glucose meter Misc Check blood sugar twice daily. Dispense meter per insurance/patient preference 1 each 0     escitalopram oxalate (LEXAPRO) 20 MG tablet Take 1 tablet (20 mg total) by mouth daily. 90 tablet 1      fluticasone propionate (FLONASE) 50 mcg/actuation nasal spray 1 spray into each nostril daily. 16 g 2     glipiZIDE (GLUCOTROL XL) 10 MG 24 hr tablet Take 2 tablets (20 mg total) by mouth daily. 180 tablet 1     LORazepam (ATIVAN) 0.5 MG tablet Take 1 tablet (0.5 mg total) by mouth daily as needed for anxiety. 15 tablet 0     pioglitazone (ACTOS) 30 MG tablet Take 1 tablet (30 mg total) by mouth daily. 90 tablet 0     [DISCONTINUED] atorvastatin (LIPITOR) 40 MG tablet TAKE ONE TABLET BY MOUTH ONCE DAILY 90 tablet 2     [DISCONTINUED] blood glucose test strips Check 1-2 times per day. 100 strip 3     [DISCONTINUED] escitalopram oxalate (LEXAPRO) 20 MG tablet Take 1 tablet (20 mg total) by mouth daily. 90 tablet 1     [DISCONTINUED] fluticasone (FLONASE) 50 mcg/actuation nasal spray 1 spray into each nostril daily. 16 g 2     [DISCONTINUED] glipiZIDE (GLUCOTROL XL) 10 MG 24 hr tablet Take 2 tablets (20 mg total) by mouth daily. 180 tablet 0     [DISCONTINUED] LORazepam (ATIVAN) 0.5 MG tablet TAKE ONE TABLET BY MOUTH ONCE DAILY AS NEEDED FOR ANXIETY (NEED  TO  BE  SEEN  FOR  FURTHER  REFILLS) 15 tablet 0     [DISCONTINUED] metFORMIN (GLUCOPHAGE-XR) 500 MG 24 hr tablet Take 2 tablets (1,000 mg total) by mouth 2 (two) times a day. 360 tablet 0     [DISCONTINUED] metFORMIN (GLUCOPHAGE-XR) 500 MG 24 hr tablet Take 2 tablets (1,000 mg total) by mouth 2 (two) times a day. 360 tablet 1     No facility-administered encounter medications on file as of 4/1/2019.      Past Medical History:   Diagnosis Date     Allergic rhinitis     Created by Conversion  Replacement Utility updated for latest IMO load     Anxiety     Created by Conversion  Replacement Utility updated for latest IMO load     Hyperlipidemia     Created by Conversion      Nicotine Dependence     Created by Conversion      Onychomycosis Of The Toenails     Created by Conversion      Type 2 diabetes mellitus (H)     Created by Conversion      Past Surgical  History:   Procedure Laterality Date     NC LAP,CHOLECYSTECTOMY      Description: Cholecystectomy Laparoscopic;  Recorded: 12/08/2009;     Social History     Tobacco Use     Smoking status: Current Every Day Smoker     Packs/day: 1.00     Smokeless tobacco: Never Used   Substance Use Topics     Alcohol use: No     Drug use: No       Objective / Physical Examination:  Vitals:    04/01/19 0906   BP: 120/74   Pulse: 82   Weight: 208 lb (94.3 kg)     Wt Readings from Last 3 Encounters:   04/01/19 208 lb (94.3 kg)   09/24/18 211 lb (95.7 kg)   01/23/18 204 lb (92.5 kg)     Body mass index is 33.32 kg/m .     General Appearance: Alert and oriented, cooperative, affect appropriate, speech clear, in no apparent distress  Lungs: Clear to auscultation bilaterally. Normal inspiratory and expiratory effort  Cardiovascular: Regular rate, normal S1, S2. No murmurs, rubs, or gallops  Extremities: DP pulses 2+ and equal throughout. No edema  Diabetic foot exam: no foot lesions or deformities. He does have ingrown toenails of the great toes without erythema/inflammation/infection   Left: Filament test present  Right: Filament test present        Orders Placed This Encounter   Procedures     Drugs of Abuse 1,Urine     Glycosylated Hemoglobin A1c     Microalbumin, Random Urine     Lipid Profile     Comprehensive Metabolic Panel     Cologuard   Followup: Return in about 6 months (around 10/1/2019). earlier if needed.      Rosie Dorado, CNP

## 2021-05-28 ENCOUNTER — RECORDS - HEALTHEAST (OUTPATIENT)
Dept: ADMINISTRATIVE | Facility: CLINIC | Age: 61
End: 2021-05-28

## 2021-05-30 NOTE — TELEPHONE ENCOUNTER
Controlled Substance Refill Request  Medication:   Requested Prescriptions     Pending Prescriptions Disp Refills     LORazepam (ATIVAN) 0.5 MG tablet [Pharmacy Med Name: LORAZEPAM 0.5MG     TAB] 15 tablet 0     Sig: TAKE 1 TABLET BY MOUTH ONCE DAILY AS NEEDED FOR ANXIETY     Date Last Fill: 4/1/19  Pharmacy: [id number]   Submit electronically to pharmacy  Controlled Substance Agreement on File:   Encounter-Level CSA Scan Date:    There are no encounter-level csa scan date.       Last office visit: Last office visit pertaining to requested medication was 4/1/19.

## 2021-05-30 NOTE — TELEPHONE ENCOUNTER
Refill Approved    Rx renewed per Medication Renewal Policy. Medication was last renewed on 4/2/19.    Christen Grimes, [facility] Triage/Med Refill 7/11/2019     Requested Prescriptions   Pending Prescriptions Disp Refills     pioglitazone (ACTOS) 30 MG tablet [Pharmacy Med Name: PIOGLITAZONE 30MG   TAB] 90 tablet 0     Sig: TAKE 1 TABLET BY MOUTH ONCE DAILY       Pioglitazone Protocol Passed - 7/11/2019  7:57 AM        Passed - Blood pressure in last 12 months     BP Readings from Last 1 Encounters:   04/01/19 120/74             Passed - LFT or AST or ALT in last 12 months     Albumin   Date Value Ref Range Status   04/01/2019 3.9 3.5 - 5.0 g/dL Final     Bilirubin, Total   Date Value Ref Range Status   04/01/2019 0.3 0.0 - 1.0 mg/dL Final     Alkaline Phosphatase   Date Value Ref Range Status   04/01/2019 100 45 - 120 U/L Final     AST   Date Value Ref Range Status   04/01/2019 17 0 - 40 U/L Final     ALT   Date Value Ref Range Status   04/01/2019 23 0 - 45 U/L Final     Protein, Total   Date Value Ref Range Status   04/01/2019 6.7 6.0 - 8.0 g/dL Final                Passed - Visit with PCP or prescribing provider visit in last 6 months      Last office visit with prescriber/PCP: 4/1/2019 OR same dept: 4/1/2019 Rosie Dorado FNP OR same specialty: 4/1/2019 Rosie Dorado FNP Last physical: Visit date not found Last MTM visit: Visit date not found         Next appt within 3 mo: Visit date not found  Next physical within 3 mo: Visit date not found  Prescriber OR PCP: KOTA East  Last diagnosis associated with med order: 1. Type 2 diabetes mellitus with hyperglycemia, without long-term current use of insulin (H)  - pioglitazone (ACTOS) 30 MG tablet [Pharmacy Med Name: PIOGLITAZONE 30MG   TAB]; TAKE 1 TABLET BY MOUTH ONCE DAILY  Dispense: 90 tablet; Refill: 0     If protocol passes may refill for 12 months if within 3 months of last provider visit (or a total of 15 months).           Passed -  A1C in last 6 months     Hemoglobin A1c   Date Value Ref Range Status   04/01/2019 8.0 (H) 3.5 - 6.0 % Final               Passed - Microalbumin in last year     Microalbumin, Random Urine   Date Value Ref Range Status   04/01/2019 1.11 0.00 - 1.99 mg/dL Final                  Passed - Serum creatinine in last year     Creatinine   Date Value Ref Range Status   04/01/2019 0.79 0.70 - 1.30 mg/dL Final

## 2021-05-31 VITALS — WEIGHT: 204 LBS | BODY MASS INDEX: 32.68 KG/M2

## 2021-05-31 VITALS — BODY MASS INDEX: 33.86 KG/M2 | WEIGHT: 211.4 LBS

## 2021-06-01 ENCOUNTER — COMMUNICATION - HEALTHEAST (OUTPATIENT)
Dept: SCHEDULING | Facility: CLINIC | Age: 61
End: 2021-06-01

## 2021-06-02 ENCOUNTER — RECORDS - HEALTHEAST (OUTPATIENT)
Dept: ADMINISTRATIVE | Facility: CLINIC | Age: 61
End: 2021-06-02

## 2021-06-02 VITALS — BODY MASS INDEX: 33.8 KG/M2 | WEIGHT: 211 LBS

## 2021-06-02 VITALS — BODY MASS INDEX: 33.32 KG/M2 | WEIGHT: 208 LBS

## 2021-06-02 NOTE — TELEPHONE ENCOUNTER
Refill Approved    Rx renewed per Medication Renewal Policy. Medication was last renewed on 4/1/19.    Christen Grimes, [facility] Triage/Med Refill 10/22/2019     Requested Prescriptions   Pending Prescriptions Disp Refills     glipiZIDE (GLUCOTROL XL) 10 MG 24 hr tablet [Pharmacy Med Name: GLIPIZIDE ER 10MG   TAB] 180 tablet 1     Sig: TAKE 2 TABLETS BY MOUTH ONCE DAILY       Oral Hypoglycemics Refill Protocol Passed - 10/22/2019 12:48 PM        Passed - Visit with PCP or prescribing provider visit in last 6 months       Last office visit with prescriber/PCP: 10/8/2019 OR same dept: 10/8/2019 Rosie Dorado FNP OR same specialty: 10/8/2019 Rosie Dorado FNP Last physical: Visit date not found Last MTM visit: Visit date not found         Next appt within 3 mo: Visit date not found  Next physical within 3 mo: Visit date not found  Prescriber OR PCP: KOTA East  Last diagnosis associated with med order: 1. Type 2 diabetes mellitus with hyperglycemia, without long-term current use of insulin (H)  - glipiZIDE (GLUCOTROL XL) 10 MG 24 hr tablet [Pharmacy Med Name: GLIPIZIDE ER 10MG   TAB]; TAKE 2 TABLETS BY MOUTH ONCE DAILY  Dispense: 180 tablet; Refill: 1     If protocol passes may refill for 12 months if within 3 months of last provider visit (or a total of 15 months).           Passed - A1C in last 6 months     Hemoglobin A1c   Date Value Ref Range Status   10/08/2019 6.4 (H) 3.5 - 6.0 % Final               Passed - Microalbumin in last year      Microalbumin, Random Urine   Date Value Ref Range Status   04/01/2019 1.11 0.00 - 1.99 mg/dL Final                  Passed - Blood pressure in last year     BP Readings from Last 1 Encounters:   10/08/19 124/74             Passed - Serum creatinine in last year     Creatinine   Date Value Ref Range Status   04/01/2019 0.79 0.70 - 1.30 mg/dL Final

## 2021-06-02 NOTE — PATIENT INSTRUCTIONS - HE
The new shingles shot (Shingrix) is 2 separate shots  by 2-6 months.    The cost out of pocket is around $390 for the 2 shots, so you might want to see if your insurance covers it or a portion of it prior to having it done.  Often by having it done at a pharmacy rather than a clinic, it can be cheaper for you. I recommend that you check on the cost through your insurance or talk to your pharmacist about the cost of the vaccine.     It is estimated that any person's risk of shingles over their lifetime is around 10-20%.    The old shingles vaccine (Zostavax) is about 50% effective, reducing your risk of shingles to about 5-10% over your lifetime.    The new shot is 90% effective, reducing your risk of shingles to about 1-2% over your lifetime.    Because the shot is strong, it is very common to have flu like symptoms for 2-3 days after the shot.  25-50% of patients will have fever, muscle aches or headache.

## 2021-06-02 NOTE — PROGRESS NOTES
Internal Medicine Office Visit  [facility]   Patient Name: Remi Mathias  Patient Age: 59 y.o.  YOB: 1960  MRN: [id number]    Date of Visit: 10/8/2019  Reason for Office Visit:   Chief Complaint   Patient presents with     Follow-up           Assessment / Plan / Medical Decision Makin. Type 2 diabetes mellitus without complication, without long-term current use of insulin (H)  - Glycosylated Hemoglobin A1c  - Continue current medications. Encouraged weight loss and regular exercise. Advised monitoring glucose at various times of the day rather than the same time each day     2. Nicotine Dependence  - Tobacco cessation advised. He was referred to [facility]. He can call for a prescription for nicotine patches if needed but he declined this today     3. Anxiety, insomnia   4. Controlled substance agreement signed, lorazepam 2018  - continue escitalopram and lorazepam as needed     5. Tinnitus, bilateral  - Ambulatory referral to Audiology    - He will do his tetanus booster at the next visit, declines to do this today     Health Maintenance Review  Health Maintenance   Topic Date Due     PREVENTIVE CARE VISIT  1960     DIABETES OPHTHALMOLOGY EXAM  1968     HIV SCREENING  1973     PNEUMOCOCCAL IMMUNIZATION 19-64 MEDIUM RISK (1 of 1 - PPSV23) 1977     ZOSTER VACCINES (1 of 2) 2008     TD 18+ HE  2019     INFLUENZA VACCINE RULE BASED (1) 2019     DIABETES FOOT EXAM  2020     DIABETES URINE MICROALBUMIN  2020     DIABETES HEMOGLOBIN A1C  2020     DIABETES FOLLOW-UP  2020     ADVANCE CARE PLANNING  10/25/2021     COLONOSCOPY  10/25/2026     TDAP ADULT ONE TIME DOSE  Completed     HEPATITIS C SCREENING  Discontinued         I am having Mahendra Mathias maintain his blood-glucose meter, blood glucose test, aspirin, blood glucose test, glipiZIDE, atorvastatin, escitalopram oxalate, fluticasone propionate,  pioglitazone, and LORazepam.      HPI:  Remi Mathias is a 59 y.o. year old who presents to the office today for follow-up.    We reviewed his history of diabetes.  He admits that he checks his glucose only about twice per week on average.  His blood sugars have improved since he began pioglitazone.  His a.m. reading was 107 today.  After eating it is typically around 127.  He denies any hypoglycemia.  He is due for an eye exam and has been putting this off.    He has a new complaint today regarding bilateral tinnitus.  It is worse in the right ear over the left.  He has some hearing loss that he has noticed particularly in the right ear.  No discharge or drainage.    Nicotine use was reviewed.  He typically smokes less than 1 pack/day.  He is interested in smoking cessation and has thought about using nicotine patches which he believes he has at home.    Review of Systems- pertinent positive in bold:  Constitutional: Fever, chills, night sweats, fainting, weight change, fatigue, seizures, dizziness, sleeping difficulties, loud snoring/pauses in breathing  Eyes: change in vision, blurred or double vision, redness/eye pain  Ears, nose, mouth, throat: change in hearing, ear pain, hoarseness, difficulty swallowing, sores in the mouth or throat  Respiratory: shortness of breath, cough, bloody sputum, wheezing  Cardiovascular: chest pain, palpitations           Current Scheduled Meds:  Outpatient Encounter Medications as of 10/8/2019   Medication Sig Dispense Refill     aspirin 81 MG EC tablet Take 81 mg by mouth daily.       atorvastatin (LIPITOR) 40 MG tablet Take 1 tablet (40 mg total) by mouth daily. 90 tablet 3     blood glucose test strips Check blood sugar twice daily. Dispense brand per patient's insurance at pharmacy discretion. 100 strip 11     blood glucose test strips Check 1-2 times per day. Dx:e11.9 100 strip 11     blood-glucose meter Misc Check blood sugar twice daily. Dispense meter per  insurance/patient preference 1 each 0     escitalopram oxalate (LEXAPRO) 20 MG tablet Take 1 tablet (20 mg total) by mouth daily. 90 tablet 1     fluticasone propionate (FLONASE) 50 mcg/actuation nasal spray 1 spray into each nostril daily. 16 g 2     glipiZIDE (GLUCOTROL XL) 10 MG 24 hr tablet Take 2 tablets (20 mg total) by mouth daily. 180 tablet 1     LORazepam (ATIVAN) 0.5 MG tablet TAKE 1 TABLET BY MOUTH ONCE DAILY AS NEEDED FOR ANXIETY 15 tablet 0     pioglitazone (ACTOS) 30 MG tablet TAKE 1 TABLET BY MOUTH ONCE DAILY 90 tablet 2     No facility-administered encounter medications on file as of 10/8/2019.          Past Medical History:   Diagnosis Date     Allergic rhinitis     Created by Conversion  Replacement Utility updated for latest IMO load     Anxiety     Created by Conversion  Replacement Utility updated for latest IMO load     Hyperlipidemia     Created by Conversion      Nicotine Dependence     Created by Conversion      Onychomycosis Of The Toenails     Created by Conversion      Type 2 diabetes mellitus (H)     Created by Conversion      Past Surgical History:   Procedure Laterality Date     DC LAP,CHOLECYSTECTOMY      Description: Cholecystectomy Laparoscopic;  Recorded: 12/08/2009;     Social History     Tobacco Use     Smoking status: Current Every Day Smoker     Packs/day: 1.00     Smokeless tobacco: Never Used   Substance Use Topics     Alcohol use: No     Drug use: No       Objective / Physical Examination:  Vitals:    10/08/19 0813   BP: 124/74   Pulse: 80   Weight: 211 lb (95.7 kg)     Wt Readings from Last 3 Encounters:   10/08/19 211 lb (95.7 kg)   04/01/19 208 lb (94.3 kg)   09/24/18 211 lb (95.7 kg)     Body mass index is 33.8 kg/m .     Constitutional: In no apparent distress  Respiratory: Clear to auscultation bilaterally. Normal inspiratory and expiratory effort  Cardiovascular: Regular rate and rhythm. No murmurs, rubs, or gallops. No edema. No carotid bruits.         Orders Placed  This Encounter   Procedures     Glycosylated Hemoglobin A1c     Ambulatory referral to Audiology   Followup: Return in about 6 months (around 4/8/2020) for Next scheduled follow up. earlier if needed.        Rosie Dorado, CNP

## 2021-06-03 VITALS
WEIGHT: 211 LBS | BODY MASS INDEX: 33.8 KG/M2 | DIASTOLIC BLOOD PRESSURE: 74 MMHG | SYSTOLIC BLOOD PRESSURE: 124 MMHG | HEART RATE: 80 BPM

## 2021-06-04 VITALS
WEIGHT: 214 LBS | BODY MASS INDEX: 33.52 KG/M2 | HEART RATE: 60 BPM | DIASTOLIC BLOOD PRESSURE: 66 MMHG | SYSTOLIC BLOOD PRESSURE: 124 MMHG | TEMPERATURE: 97.9 F

## 2021-06-04 VITALS — WEIGHT: 214.51 LBS | BODY MASS INDEX: 33.67 KG/M2 | HEIGHT: 67 IN

## 2021-06-05 VITALS — DIASTOLIC BLOOD PRESSURE: 72 MMHG | WEIGHT: 210 LBS | SYSTOLIC BLOOD PRESSURE: 122 MMHG | BODY MASS INDEX: 32.89 KG/M2

## 2021-06-06 NOTE — ANESTHESIA CARE TRANSFER NOTE
Last vitals:   Vitals:    02/26/20 1823   BP: 120/78   Pulse: 86   Resp: 16   Temp: 97.3 F   SpO2: 94 %     Patient's level of consciousness is drowsy  Spontaneous respirations: yes  Maintains airway independently: yes  Dentition unchanged: yes  Oropharynx: oropharynx clear of all foreign objects    QCDR Measures:  ASA# 20 - Surgical Safety Checklist: WHO surgical safety checklist completed prior to induction    PQRS# 430 - Adult PONV Prevention: 4558F - Pt received => 2 anti-emetic agents (different classes) preop & intraop  ASA# 8 - Peds PONV Prevention: NA - Not pediatric patient, not GA or 2 or more risk factors NOT present  PQRS# 424 - [place]-op Temp Management: 4559F - At least one body temp DOCUMENTED => 35.5C or 95.9F within required timeframe  PQRS# 426 - PACU Transfer Protocol: - Transfer of care checklist used  ASA# 14 - Acute Post-op Pain: ASA14B - Patient did NOT experience pain >= 7 out of 10

## 2021-06-06 NOTE — PROGRESS NOTES
Internal Medicine Office Visit  [facility]   Patient Name: Remi Mathias  Patient Age: 59 y.o.  YOB: 1960  MRN: [id number]    Date of Visit: 3/9/2020  Reason for Office Visit:   Chief Complaint   Patient presents with     Hospital Visit Follow Up           Assessment / Plan / Medical Decision Makin. Hospital discharge follow-up  2. Pyogenic arthritis of right hand, due to unspecified organism (H)  3. Cat bite of hand, right, initial encounter  - Inpatient notes reviewed  - Orthopedics requested to see patient back in 5-7 days following discharge but he is not yet scheduled. I will have him make an appointment prior to leaving the clinic, will set for the next 1-2 days  - He is advised to complete full course of antibiotics. May need an additional 1 week of treatment if he continues to have drainage. He will monitor for a fever   - Ambulatory referral to Orthopedics    4. Diabetes  - Continue with glipizide only and monitor home glucose readings  - Repeat A1C in 6 months         Health Maintenance Review  Health Maintenance   Topic Date Due     PREVENTIVE CARE VISIT  1960     DIABETIC EYE EXAM  1960     HIV SCREENING  1973     PNEUMOCOCCAL IMMUNIZATION 19-64 MEDIUM RISK (1 of 1 - PPSV23) 1977     ZOSTER VACCINES (1 of 2) 2008     TD 18+ HE  2019     DIABETIC FOOT EXAM  2020     LIPID  2020     MICROALBUMIN  2020     A1C  2020     BMP  2021     ADVANCE CARE PLANNING  10/25/2021     COLORECTAL CANCER SCREENING  10/25/2026     INFLUENZA VACCINE RULE BASED  Completed     TDAP ADULT ONE TIME DOSE  Completed     HEPATITIS C SCREENING  Discontinued         I am having Mahendra Mathias maintain his blood-glucose meter, blood glucose test, aspirin, blood glucose test, LORazepam, glipiZIDE, escitalopram oxalate, acetaminophen, senna-docusate, HYDROcodone-acetaminophen, amoxicillin-clavulanate, and fluticasone  propionate.      HPI:  Remi Mathias is a 59 y.o. year old who presents to the office today for follow-up of a recent hospitalization for right hand cellulitis after a cat bite. He was admitted on 2/26.  He was found to have second MCP joint septic arthritis and was taken to the OR for I&D of the right hand.  He was transitioned from IV Zosyn to Augmentin. He was discharged on 2/29 and told to follow-up with the surgeon in 5 to 7 days.  Wound care was continued with soaks and dressing changes until this visit.  He has not had any chills and does not have a thermometer to check his temperature but has not felt feverish.  There is sometimes a small amount of white or yellow-colored drainage that drains from the incisions. Swelling and pain has improved significantly. He continues with the soaks 3 times daily and dressing changes.  He is not scheduled with orthopedics at this point.  He continues aspirin or ibuprofen for pain and occasionally takes hydrocodone when needed.    Diabetes was reviewed.  He was taking both glipizide and Actos, Actos was discontinued due to risk of hypoglycemia with a last A1c of 6.2%. This was started initially when A1c was 8.0% April 2019.  He has not seen a big change in his glucose readings since discontinuing the medication.    Review of Systems- pertinent positive in bold:  Negative for fever, chills, rigors       Current Scheduled Meds:  Outpatient Encounter Medications as of 3/9/2020   Medication Sig Dispense Refill     acetaminophen (TYLENOL) 325 MG tablet Take 2 tablets (650 mg total) by mouth every 4 (four) hours as needed.  0     amoxicillin-clavulanate (AUGMENTIN) 875-125 mg per tablet Take 1 tablet by mouth 2 (two) times a day for 12 days. 24 tablet 0     aspirin 81 MG EC tablet Take 81 mg by mouth daily.       blood glucose test strips Check blood sugar twice daily. Dispense brand per patient's insurance at pharmacy discretion. 100 strip 11     blood glucose test strips  Check 1-2 times per day. Dx:e11.9 100 strip 11     blood-glucose meter Misc Check blood sugar twice daily. Dispense meter per insurance/patient preference 1 each 0     escitalopram oxalate (LEXAPRO) 20 MG tablet TAKE 1 TABLET BY MOUTH ONCE DAILY 90 tablet 1     fluticasone propionate (FLONASE) 50 mcg/actuation nasal spray 1 spray into each nostril daily as needed.       glipiZIDE (GLUCOTROL XL) 10 MG 24 hr tablet TAKE 2 TABLETS BY MOUTH ONCE DAILY 180 tablet 1     HYDROcodone-acetaminophen (NORCO) 7.5-325 mg per tablet Take 1 tablet by mouth every 6 (six) hours as needed. 15 tablet 0     LORazepam (ATIVAN) 0.5 MG tablet TAKE 1 TABLET BY MOUTH ONCE DAILY AS NEEDED FOR ANXIETY 15 tablet 0     senna-docusate (PERICOLACE) 8.6-50 mg tablet Take 1 tablet by mouth 2 (two) times a day as needed for constipation.  0     No facility-administered encounter medications on file as of 3/9/2020.        Past Medical History:   Diagnosis Date     Allergic rhinitis     Created by Conversion  Replacement Utility updated for latest IMO load     Anxiety     Created by Conversion  Replacement Utility updated for latest IMO load     Hyperlipidemia     Created by Conversion      Nicotine Dependence     Created by Conversion      Onychomycosis Of The Toenails     Created by Conversion      Type 2 diabetes mellitus (H)     Created by Conversion      Past Surgical History:   Procedure Laterality Date     INCISION AND DRAINAGE OF WOUND Right 2/26/2020    Procedure: Exploration and Arthrotomy of metacarpophalangeal  joint;  Surgeon: Jany Cote MD;  [place] [facility] [place]  Service: Orthopedics     IA LAP,CHOLECYSTECTOMY      Description: Cholecystectomy Laparoscopic;  Recorded: 12/08/2009;     Social History     Tobacco Use     Smoking status: Current Every Day Smoker     Packs/day: 1.00     Smokeless tobacco: Never Used   Substance Use Topics     Alcohol use: No     Drug use: No       Objective / Physical Examination:  Vitals:     03/09/20 1313   BP: 124/66   Pulse: 60   Temp: 97.9  F (36.6  C)   Weight: 214 lb (97.1 kg)     Wt Readings from Last 3 Encounters:   03/09/20 214 lb (97.1 kg)   02/26/20 214 lb 8.1 oz (97.3 kg)   10/08/19 211 lb (95.7 kg)     Body mass index is 33.52 kg/m .     Constitutional: In no apparent distress  MSK: right hand with sensation in all fingers. Able to flex and extend all fingers but limited ROM in the right index finger. Incision edges well approximated. There is a scant amount of purulent drainage on the bandage when he removes this but no surrounding erythema. He has swelling over the fingers, and dorsum of the hand.     Orders Placed This Encounter   Procedures     Ambulatory referral to Orthopedics   Followup: Return in about 6 months (around 9/9/2020) for Next scheduled follow up. earlier if needed.        Rosie Dorado, CNP

## 2021-06-06 NOTE — ANESTHESIA POSTPROCEDURE EVALUATION
Patient: Remi Mathias  Procedure(s):  Exploration and Arthrotomy of metacarpophalangeal  joint (Right)  Anesthesia type: regional    Patient location: Phase II Recovery  Last vitals:   Vitals Value Taken Time   /74 2/26/2020  6:33 PM   Temp 36.3  C (97.3  F) 2/26/2020  6:20 PM   Pulse 81 2/26/2020  6:40 PM   Resp 38 2/26/2020  6:40 PM   SpO2 94 % 2/26/2020  6:40 PM   Vitals shown include unvalidated device data.  Post vital signs: stable  Level of consciousness: awake and responds to simple questions  Post-anesthesia pain: pain controlled  Post-anesthesia nausea and vomiting: no  Pulmonary: unassisted, return to baseline  Cardiovascular: stable and blood pressure at baseline  Hydration: adequate  Anesthetic events: no    QCDR Measures:  ASA# 11 - [place]-op Cardiac Arrest: ASA11B - Patient did NOT experience unanticipated cardiac arrest  ASA# 12 - [place]-op Mortality Rate: ASA12B - Patient did NOT die  ASA# 13 - PACU Re-Intubation Rate: NA - No ETT / LMA used for case  ASA# 10 - Composite Anes Safety: ASA10A - No serious adverse event    Additional Notes:

## 2021-06-06 NOTE — PROGRESS NOTES
Saw patient for cat bite wound. Concern for significant deep tissue infection and septic arthritis of R hand.    Also he is not up to date with tetanus and we do not have tetanus immune globulin.    Advised patient go to the ED for imaging, abs, surgical intervention and likely immune globulin. He understands and will go.    Will not bill him for this visit.     Dr. Leiva

## 2021-06-06 NOTE — ANESTHESIA PROCEDURE NOTES
Peripheral Block    Patient location during procedure: pre-op  Start time: 2/26/2020 4:41 PM  End time: 2/26/2020 4:48 PM  post-op analgesia per surgeon order as noted in medical record  Staffing:  Performing  Anesthesiologist: Martine Funes MD  Preanesthetic Checklist  Completed: patient identified, site marked, risks, benefits, and alternatives discussed, timeout performed, consent obtained, airway assessed, oxygen available, suction available, emergency drugs available and hand hygiene performed  Peripheral Block  Block type: brachial plexus, supraclavicular  Prep: ChloraPrep  Patient position: sitting  Patient monitoring: cardiac monitor, continuous pulse oximetry, heart rate and blood pressure  Laterality: right  Injection technique: ultrasound guided    Ultrasound used to visualize needle placement in proximity to nerve being blocked: yes   US used to visualize anesthetic spread  Visualized anatomic structures normal  No Pathological Findings  Permanent ultrasound image captured for medical record  Sterile gel and probe cover used for ultrasound.  Needle  Needle type: Stimuplex   Needle gauge: 21 G  Needle length: 4 in  no peripheral nerve catheter placed  Assessment  Injection assessment: no difficulty with injection, negative aspiration for heme, no paresthesia on injection and incremental injection

## 2021-06-06 NOTE — ANESTHESIA PREPROCEDURE EVALUATION
Anesthesia Evaluation      No history of anesthetic complications     Airway    Pulmonary    (+) a smoker                         Cardiovascular   Exercise tolerance: > or = 4 METS  (-) hypertension, hypercholesterolemia   Neuro/Psych    (+) anxiety/panic attacks,     Endo/Other    (+) diabetes mellitus type 2, obesity (BMI 33),      GI/Hepatic/Renal       Other findings: Cat bite to R hand with pain and likely associated infection, s/f surgical exploration and likely I&D of the wound      Dental                         Anesthesia Plan  Planned anesthetic: MAC and peripheral nerve block  preop supraclav PNB per surgeon request   MAC - propofol gtt  Zofran 4mg for antiemesis  ASA 2     Anesthetic plan and risks discussed with: patient  Anesthesia plan special considerations: antiemetics,   Post-op plan: routine recovery

## 2021-06-07 NOTE — TELEPHONE ENCOUNTER
Lab Results   Component Value Date    HGBA1C 6.2 (H) 02/27/2020    HGBA1C 6.4 (H) 10/08/2019    HGBA1C 8.0 (H) 04/01/2019    HGBA1C 7.3 (H) 09/24/2018    HGBA1C 7.7 (H) 01/24/2018    HGBA1C 9.9 (H) 05/09/2017    HGBA1C 8.6 (H) 10/25/2016    HGBA1C 7.6 (H) 11/04/2014    HGBA1C 6.8 (H) 03/19/2013    HGBA1C 6.8 (H) 07/31/2012    HGBA1C 10.5 (H) 12/08/2011    HGBA1C 8.0 (H) 12/15/2010    HGBA1C 6.8 (H) 03/10/2010    HGBA1C 6.0 08/04/2009

## 2021-06-08 NOTE — TELEPHONE ENCOUNTER
Controlled Substance Refill Request  Medication Name:   Requested Prescriptions     Pending Prescriptions Disp Refills     LORazepam (ATIVAN) 0.5 MG tablet [Pharmacy Med Name: LORazepam 0.5 MG Oral Tablet] 15 tablet 0     Sig: TAKE 1 TABLET BY MOUTH ONCE DAILY AS NEEDED FOR ANXIETY     Date Last Fill: 7/23/19  Requested Pharmacy: [facility]  Submit electronically to pharmacy  Controlled Substance Agreement on file:   Encounter-Level CSA Scan Date - 09/24/2018:    Scan on 9/26/2018  4:14 PM        Last office visit:  3/9/20

## 2021-06-09 ENCOUNTER — RECORDS - HEALTHEAST (OUTPATIENT)
Dept: EDUCATION SERVICES | Facility: CLINIC | Age: 61
End: 2021-06-09

## 2021-06-10 NOTE — PROGRESS NOTES
Internal Medicine Office Visit  Patient Name: Remi Mathias  Patient Age: 57 y.o.  YOB: 1960  MRN: [id number]  ?  Date of Visit: 2017  Reason for Office Visit:   Chief Complaint   Patient presents with     Follow-up     DM med check no concerns       Assessment / Plan / Medical Decision Makin. Type 2 diabetes mellitus  2. Hyperkalemia  3. Diabetes  4. Nicotine Dependence  - Change Metformin to extended release Metformin 2000 mg daily with breakfast.  Anticipate that his blood sugar will improve with consistency in taking this medication.  - Smoking cessation strongly recommended.  He would like to do this on his own; declines further assistance at this time  - Anxiety symptoms well controlled at this time.  He continues to take Ativan 1 tablet per week, 2 with the most  - Reviewed options for colon cancer screening.  He is agreeable to doing the Cologuard stool test and this will be sent to him.      Health Maintenance Review  Health Maintenance   Topic Date Due     DIABETES FOLLOW-UP  1960     DIABETES FOOT EXAM  1968     DIABETES OPHTHALMOLOGY EXAM  1968     DIABETES URINE MICROALBUMIN  10/25/2017     DIABETES HEMOGLOBIN A1C  2017     TD 18+ HE  2019     ADVANCE DIRECTIVES DISCUSSED WITH PATIENT  10/25/2021     COLONOSCOPY  10/25/2026     INFLUENZA VACCINE RULE BASED  Completed     TDAP ADULT ONE TIME DOSE  Completed         I have discontinued Mr. Mathias's metFORMIN. I have also changed his escitalopram oxalate, LORazepam, and nortriptyline. Additionally, I am having him start on metFORMIN. Lastly, I am having him maintain his aspirin, glipiZIDE, atorvastatin, and blood glucose test.     HPI:   Encounter Diagnoses   Name Primary?     Type 2 diabetes mellitus Yes     Hyperkalemia      Diabetes      Nicotine Dependence       DM: fasting readings in the 140s, post-prandial 180s. Admits that he frequently forgets to take a second dose of metformin and  also has some stomach upset associated with the medication.  He frequently takes metformin 500 mg once daily due to the stomach upset.    We reviewed his history of tobacco use.  He would like to quit.  He is currently smoking 1 pack per day.  Declines any additional assistance with this at the present time.    Anxiety: He is currently taking X citalopram 20 mg daily.  He has Lorazepam 0.5 mg for use as needed, he typically takes 1 tablet per week, 2 at the most.  He takes nortriptyline at bedtime to help him sleep.    Colon cancer screening: declines colonoscopy. Would be interested in Cologuard stool test.       Review of Systems: Denies chest pain, polyuria, polydipsia.  No lower extremity numbness or tingling.      Current Scheduled Meds:  Outpatient Encounter Prescriptions as of 5/9/2017   Medication Sig Dispense Refill     aspirin 81 MG EC tablet Take 1 tablet (81 mg total) by mouth daily. 150 tablet 2     atorvastatin (LIPITOR) 40 MG tablet Take 1 tablet (40 mg total) by mouth daily. 90 tablet 3     blood glucose test strips Check 1-2 times per day. 100 strip 3     escitalopram oxalate (LEXAPRO) 20 MG tablet Take 1 tablet (20 mg total) by mouth daily. 30 tablet 2     glipiZIDE (GLUCOTROL) 5 MG tablet TAKE 1 TABLET (5 MG TOTAL) BY MOUTH DAILY. 90 tablet 3     LORazepam (ATIVAN) 0.5 MG tablet Take 1 tablet (0.5 mg total) by mouth daily as needed. 30 tablet 0     nortriptyline (PAMELOR) 10 MG capsule Take 3 capsules (30 mg total) by mouth at bedtime. 180 capsule 2     [DISCONTINUED] atorvastatin (LIPITOR) 40 MG tablet Take 1 tablet (40 mg total) by mouth daily. 90 tablet 3     [DISCONTINUED] blood glucose test strips Check 1-2 times per day. 100 strip 3     [DISCONTINUED] escitalopram oxalate (LEXAPRO) 20 MG tablet Take 1 tablet by mouth daily.       [DISCONTINUED] glipiZIDE (GLUCOTROL) 5 MG tablet TAKE 1 TABLET (5 MG TOTAL) BY MOUTH DAILY. 90 tablet 0     [DISCONTINUED] LORazepam (ATIVAN) 0.5 MG tablet Take 1  tablet by mouth daily as needed.       [DISCONTINUED] metFORMIN (GLUCOPHAGE) 1000 MG tablet Take 1 tablet (1,000 mg total) by mouth 2 (two) times a day with meals. 180 tablet 1     [DISCONTINUED] nortriptyline (PAMELOR) 10 MG capsule Take 3 capsules by mouth bedtime.       metFORMIN (GLUCOPHAGE-XR) 500 MG 24 hr tablet Take 4 tablets (2,000 mg total) by mouth daily with breakfast. 120 tablet 5     No facility-administered encounter medications on file as of 5/9/2017.      Past Medical History:   Diagnosis Date     Allergic rhinitis     Created by Conversion  Replacement Utility updated for latest IMO load     Anxiety     Created by Conversion  Replacement Utility updated for latest IMO load     Hyperlipidemia     Created by Conversion      Nicotine Dependence     Created by Conversion      Onychomycosis Of The Toenails     Created by Conversion      Type 2 diabetes mellitus     Created by Conversion      Past Surgical History:   Procedure Laterality Date     NH LAP,CHOLECYSTECTOMY      Description: Cholecystectomy Laparoscopic;  Recorded: 12/08/2009;     Social History   Substance Use Topics     Smoking status: Current Every Day Smoker     Packs/day: 1.00     Smokeless tobacco: None     Alcohol use No       Objective / Physical Examination:  Vitals:    05/09/17 0731   BP: 118/76   Patient Site: Left Arm   Patient Position: Sitting   Cuff Size: Adult Regular   Pulse: 93   Weight: 211 lb 6.4 oz (95.9 kg)     Wt Readings from Last 3 Encounters:   05/09/17 211 lb 6.4 oz (95.9 kg)   10/25/16 205 lb (93 kg)     Body mass index is 33.86 kg/(m^2).     General Appearance: Alert and oriented, cooperative, affect appropriate, speech clear, in no apparent distress  Neck: Supple, trachea midline. No carotid bruits   Lungs: Clear to auscultation bilaterally. Normal inspiratory and expiratory effort  Cardiovascular: Regular rate, normal S1, S2  Neuro: LE monofilament test- intact sensation     Orders Placed This Encounter    Procedures     Glycosylated Hemoglobin A1c     Potassium     Glycosylated Hemoglobin A1c   Followup: Return in about 3 months (around 8/9/2017) for Recheck. earlier if needed.      Rosie Dorado, CNP  [facility]

## 2021-06-11 NOTE — TELEPHONE ENCOUNTER
Please call patient -    ______________________________________________________________________     Home phone:  [phone] (home)     Cell phone:   Telephone Information:   Mobile [phone]       Other contacts:  Name Home Phone Work Phone Mobile Phone Relationship Lgl JESSICA Martinez [phone]   Spouse    DECLINED, PER PT    Declined      ______________________________________________________________________     He is due for follow up of his diabetes with Rosie Dorado NP.  Please help him to schedule.    Thomas Garcia MD  [facility]  9/11/2020, 10:59 PM

## 2021-06-11 NOTE — TELEPHONE ENCOUNTER
Called and lvm for patient stating refill was sent and he is due for an appointment.    Please assist in scheduling

## 2021-06-12 NOTE — TELEPHONE ENCOUNTER
Refill Approved    Rx renewed per Medication Renewal Policy. Medication was last renewed on 4/21/20.    Christen Grimes, [facility] Connection Triage/Med Refill 10/29/2020     Requested Prescriptions   Pending Prescriptions Disp Refills     glipiZIDE (GLUCOTROL XL) 10 MG 24 hr tablet [Pharmacy Med Name: glipiZIDE ER 10 MG Oral Tablet Extended Release 24 Hour] 180 tablet 0     Sig: Take 2 tablets by mouth once daily       Oral Hypoglycemics Refill Protocol Passed - 10/27/2020  5:41 PM        Passed - Visit with PCP or prescribing provider visit in last 6 months       Last office visit with prescriber/PCP: Visit date not found OR same dept: 10/5/2020 Rosie Dorado FNP OR same specialty: 10/5/2020 Rosie Dorado FNP Last physical: Visit date not found Last MTM visit: Visit date not found         Next appt within 3 mo: Visit date not found  Next physical within 3 mo: Visit date not found  Prescriber OR PCP: Thomas Garcia MD  Last diagnosis associated with med order: 1. Type 2 diabetes mellitus with hyperglycemia, without long-term current use of insulin (H)  - glipiZIDE (GLUCOTROL XL) 10 MG 24 hr tablet [Pharmacy Med Name: glipiZIDE ER 10 MG Oral Tablet Extended Release 24 Hour]; Take 2 tablets by mouth once daily  Dispense: 180 tablet; Refill: 0     If protocol passes may refill for 12 months if within 3 months of last provider visit (or a total of 15 months).           Passed - A1C in last 6 months     Hemoglobin A1c   Date Value Ref Range Status   10/05/2020 6.7 (H) <=5.6 % Final     Comment:     Normal <5.7% Prediabete 5.7-6.4% Diabletes 6.5% or higher - adopted from ADA consensus guidelines               Passed - Microalbumin in last year      Microalbumin, Random Urine   Date Value Ref Range Status   10/05/2020 3.36 (H) 0.00 - 1.99 mg/dL Final                  Passed - Blood pressure in last year     BP Readings from Last 1 Encounters:   10/05/20 122/72             Passed - Serum creatinine in last year      Creatinine   Date Value Ref Range Status   02/28/2020 0.79 0.70 - 1.30 mg/dL Final

## 2021-06-12 NOTE — PROGRESS NOTES
Internal Medicine Office Visit  [facility]   Patient Name: Remi Mathias  Patient Age: 60 y.o.  YOB: 1960  MRN: [id number]    Date of Visit: 10/5/2020  Reason for Office Visit:   Chief Complaint   Patient presents with     Medication Management           Assessment / Plan / Medical Decision Makin. Type 2 diabetes mellitus with hyperglycemia, without long-term current use of insulin (H)  Continue current glipizide only medication   - Glycosylated Hemoglobin A1c  - Microalbumin, Random Urine    2. Anxiety, insomnia   Encounter for therapeutic drug monitoring  - escitalopram oxalate (LEXAPRO) 20 MG tablet; Take 1 tablet (20 mg total) by mouth daily.  Dispense: 90 tablet; Refill: 1  - Lorazepam as needed   - Drugs of Abuse 1,Urine    3. Essential hypertension, benign  Stable     4. Nicotine Dependence  Not interested in cessation at this time    5. Hyperlipidemia, unspecified hyperlipidemia type  - not currently on statin, discontinued unintentionally after a hospitalization. Will plan to resume due to diagnosis of DM    - Lipid Profile  - ALT (SGPT)          Health Maintenance Review  Health Maintenance   Topic Date Due     PREVENTIVE CARE VISIT  1960     Pneumococcal Vaccine: Pediatrics (0 to 5 Years) and At-Risk Patients (6 to 64 Years) (1 of 1 - PPSV23) 1964     HIV SCREENING  1973     ZOSTER VACCINES (1 of 2) 2008     DIABETIC FOOT EXAM  2020     DIABETIC EYE EXAM  2020     BMP  2021     A1C  2021     LIPID  10/05/2021     MICROALBUMIN  10/05/2021     ADVANCE CARE PLANNING  10/25/2021     COLORECTAL CANCER SCREENING  10/25/2026     TD 18+ HE  10/05/2030     INFLUENZA VACCINE RULE BASED  Completed     TDAP ADULT ONE TIME DOSE  Completed     HEPATITIS C SCREENING  Discontinued     HEPATITIS B VACCINES  Discontinued         I have discontinued Mahendra Mathias's aspirin, senna-docusate, and HYDROcodone-acetaminophen. I  "have also changed his escitalopram oxalate. Additionally, I am having him maintain his blood-glucose meter, blood glucose test, blood glucose test, acetaminophen, fluticasone propionate, glipiZIDE, and LORazepam.      HPI:  Remi Mathias is a 60 y.o. year old who presents to the office today for follow up.     Diabetes reviewed, blood sugar today was 101.     Anxiety was reviewed, he continues escitalopram for anxiety which has been helpful for him for many years. Some occasional break through anxiety but not often. PRN lorazepam, last dose 2 weeks ago.     He has OA in the hands, lower back, and legs. Takes acetaminophen when needed.     Tobacco use was reviewed, smokes 1 pack per day. Not interested in cessation at this time, \"I know I need to\".     Review of Systems:  Respiratory: shortness of breath, cough, bloody sputum, wheezing  Cardiovascular: chest pain, palpitations   Gastrointestinal: abdominal pain, heartburn/indigestion, nausea/vomiting, change in appetite, change in bowel habits, constipation or diarrhea, rectal bleeding/dark stools, difficulty swallowing  Urinary: painful urination, frequent urination, urinary urgency/incontinence, blood in urine/dark urine, nocturia        Current Scheduled Meds:  Outpatient Encounter Medications as of 10/5/2020   Medication Sig Dispense Refill     acetaminophen (TYLENOL) 325 MG tablet Take 2 tablets (650 mg total) by mouth every 4 (four) hours as needed.  0     blood glucose test strips Check blood sugar twice daily. Dispense brand per patient's insurance at pharmacy discretion. 100 strip 11     blood glucose test strips Check 1-2 times per day. Dx:e11.9 100 strip 11     blood-glucose meter Misc Check blood sugar twice daily. Dispense meter per insurance/patient preference 1 each 0     escitalopram oxalate (LEXAPRO) 20 MG tablet Take 1 tablet (20 mg total) by mouth daily. 90 tablet 1     fluticasone propionate (FLONASE) 50 mcg/actuation nasal spray 1 spray " into each nostril daily as needed.       glipiZIDE (GLUCOTROL XL) 10 MG 24 hr tablet Take 2 tablets by mouth once daily 180 tablet 1     LORazepam (ATIVAN) 0.5 MG tablet TAKE 1 TABLET BY MOUTH ONCE DAILY AS NEEDED FOR ANXIETY 15 tablet 0     [DISCONTINUED] aspirin 81 MG EC tablet Take 81 mg by mouth daily.       [DISCONTINUED] escitalopram oxalate (LEXAPRO) 20 MG tablet Take 1 tablet by mouth once daily 90 tablet 0     [DISCONTINUED] HYDROcodone-acetaminophen (NORCO) 7.5-325 mg per tablet Take 1 tablet by mouth every 6 (six) hours as needed. 15 tablet 0     [DISCONTINUED] senna-docusate (PERICOLACE) 8.6-50 mg tablet Take 1 tablet by mouth 2 (two) times a day as needed for constipation.  0     No facility-administered encounter medications on file as of 10/5/2020.          Past Medical History:   Diagnosis Date     Allergic rhinitis     Created by Conversion  Replacement Utility updated for latest IMO load     Anxiety     Created by Conversion  Replacement Utility updated for latest IMO load     Hyperlipidemia     Created by Conversion      Nicotine Dependence     Created by Conversion      Onychomycosis Of The Toenails     Created by Conversion      Type 2 diabetes mellitus (H)     Created by Conversion      Past Surgical History:   Procedure Laterality Date     INCISION AND DRAINAGE OF WOUND Right 2/26/2020    Procedure: Exploration and Arthrotomy of metacarpophalangeal  joint;  Surgeon: Jany Cote MD;  Location: [facility];  Service: Orthopedics     FL LAP,CHOLECYSTECTOMY      Description: Cholecystectomy Laparoscopic;  Recorded: 12/08/2009;     Social History     Tobacco Use     Smoking status: Current Every Day Smoker     Packs/day: 1.00     Smokeless tobacco: Never Used   Substance Use Topics     Alcohol use: No     Drug use: No       Objective / Physical Examination:  Vitals:    10/05/20 1406   BP: 122/72   Weight: 210 lb (95.3 kg)     Wt Readings from Last 3 Encounters:   10/05/20 210 lb (95.3  kg)   03/09/20 214 lb (97.1 kg)   02/26/20 214 lb 8.1 oz (97.3 kg)     Body mass index is 32.89 kg/m .     Constitutional: In no apparent distress  Respiratory: Clear to auscultation bilaterally. Normal inspiratory and expiratory effort  Cardiovascular: Regular rate and rhythm. No murmurs, rubs, or gallops. No edema. No carotid bruits.   Psych: Alert and oriented x3.     Orders Placed This Encounter   Procedures     Td, Preservative Free (green label)     Glycosylated Hemoglobin A1c     Lipid Profile     ALT (SGPT)     Microalbumin, Random Urine     Drugs of Abuse 1,Urine   Followup: Return in about 6 months (around 4/5/2021) for Next scheduled follow up. earlier if needed.          Rosie Dorado, CNP

## 2021-06-13 NOTE — TELEPHONE ENCOUNTER
Controlled Substance Refill Request  Medication Name:   Requested Prescriptions     Pending Prescriptions Disp Refills     pioglitazone (ACTOS) 30 MG tablet [Pharmacy Med Name: Pioglitazone HCl 30 MG Oral Tablet] 90 tablet 0     Sig: Take 1 tablet by mouth once daily     LORazepam (ATIVAN) 0.5 MG tablet [Pharmacy Med Name: LORazepam 0.5 MG Oral Tablet] 15 tablet 0     Sig: TAKE 1 TABLET BY MOUTH ONCE DAILY AS NEEDED FOR ANXIETY     Date Last Fill: 6/3/20  Requested Pharmacy: [facility]  Submit electronically to pharmacy  Controlled Substance Agreement on file:   Encounter-Level CSA Scan Date - 09/24/2018:    Scan on 9/26/2018  4:14 PM        Last office visit:  10/5/20        pioglitazone (ACTOS) 30 MG tablet [[id number]]  Patient-reported historical medication  Ordering date: 02/26/20 1318 Authorized by: PROVIDER, HISTORICAL   Frequency:  01/09/20 - 02/26/20  Discontinued by: Cherelle Hall, PharmD 02/26/20 1501 [Therapy completed

## 2021-06-13 NOTE — TELEPHONE ENCOUNTER
Last Office Visit  10/5/2020 Roise Dorado FNP  Notes:     2. Anxiety, insomnia   Encounter for therapeutic drug monitoring  - escitalopram oxalate (LEXAPRO) 20 MG tablet; Take 1 tablet (20 mg total) by mouth daily.  Dispense: 90 tablet; Refill: 1  - Lorazepam as needed   - Drugs of Abuse 1,Urine    Last Filled: 06/03/2020        Next OV:  Visit date not found        Medication teed up for provider signature

## 2021-06-13 NOTE — TELEPHONE ENCOUNTER
pioglitazone (ACTOS) 30 MG tablet [[id number]]  Patient-reported historical medication  Ordering date: 02/26/20 1318 Authorized by: PROVIDER, HISTORICAL   Frequency:  01/09/20 - 02/26/20  Discontinued by: Cherelle Hall, PharmD 02/26/20 9487 [Therapy completed]

## 2021-06-15 NOTE — PROGRESS NOTES
Internal Medicine Office Visit  [facility]  Patient Name: Remi Mathias  Patient Age: 57 y.o.  YOB: 1960  MRN: [id number]    Date of Visit: 2018  Reason for Office Visit:   Chief Complaint   Patient presents with     Medication Management           Assessment / Plan / Medical Decision Makin. Type 2 diabetes mellitus  2. Nicotine Dependence  3. Encounter for smoking cessation counseling  4. Anxiety  5. Medication monitoring encounter  - Split your dose of metformin to 2 pills in the morning and 2 in the evening. If you have loose stools after 2 weeks, reduce your dose to just 2 pills per day in the morning. He was given a log to record glucose readings   - Increase glipizide to 10 mg daily  - Reminder to schedule a diabetic eye exam. Cancer screening reviewed. He has the Cologuard kit at home but has not returned this, reminder to do so.   - I have counseled the patient for tobacco cessation and prescribed the patient a tobacco cessation medication and the follow up will occur  at the next visit. A total of 5 minutes was spent in counseling. He has set a quit date of 2018  - Follow up in office in 3 months.     Health Maintenance Review  Health Maintenance   Topic Date Due     DIABETES OPHTHALMOLOGY EXAM  1968     INFLUENZA VACCINE RULE BASED (1) 2017     DIABETES URINE MICROALBUMIN  10/25/2017     DIABETES HEMOGLOBIN A1C  2017     DIABETES FOLLOW-UP  2018     DIABETES FOOT EXAM  2019     TD 18+ HE  2019     ADVANCE DIRECTIVES DISCUSSED WITH PATIENT  10/25/2021     COLONOSCOPY  10/25/2026     TDAP ADULT ONE TIME DOSE  Completed         I have discontinued Mr. Mathias's glipiZIDE. I have also changed his metFORMIN and escitalopram oxalate. Additionally, I am having him start on glipiZIDE, nicotine, nicotine, and nicotine. Lastly, I am having him maintain his blood glucose test, LORazepam, nortriptyline, and  atorvastatin.      HPI:  Remi Mathias is a 57 y.o. year old who presents to the office today for follow up of chronic medical conditions.     He has some pain in the right thumb which started about 2 weeks ago. He does not recall any injury. The pain is worse if he is grasping something heavy with the right hand. He took ibuprofen     He also has a new complaint of numbness in digits 2-4 which is there in the morning and resolves within a few minutes in the morning.     We reviewed his history of diabetes. He checks his glucose about once per week and typically this reading is around 140. He had loose stools when he took 4 tablets of metformin so he reduced his dose to 2 pills per day.     He has a history of anxiety and takes escitalopram for this.     He continues to use nicotine, smokes 1 pack per day. He is interested in using nicotine patches. His wife was recently hospitalized with smoking related complications for 3 months. She is back at home now.     HLD- He is taking a statin for this, denies myalgias or weakness associated with treatment.     Review of Systems- pertinent positive in bold:  Negative for chest pain, paresthesias, dyspnea       Current Scheduled Meds:  Outpatient Encounter Prescriptions as of 1/23/2018   Medication Sig Dispense Refill     atorvastatin (LIPITOR) 40 MG tablet Take 1 tablet (40 mg total) by mouth daily. 90 tablet 3     blood glucose test strips Check 1-2 times per day. 100 strip 3     escitalopram oxalate (LEXAPRO) 20 MG tablet Take 1 tablet (20 mg total) by mouth daily. 90 tablet 1     LORazepam (ATIVAN) 0.5 MG tablet Take 1 tablet (0.5 mg total) by mouth daily as needed for anxiety. Due for office follow up before next refill 30 tablet 0     metFORMIN (GLUCOPHAGE-XR) 500 MG 24 hr tablet Take 2 tablets (1,000 mg total) by mouth 2 (two) times a day. 360 tablet 0     [DISCONTINUED] atorvastatin (LIPITOR) 40 MG tablet Take 1 tablet (40 mg total) by mouth daily. 90 tablet 3      [DISCONTINUED] escitalopram oxalate (LEXAPRO) 20 MG tablet TAKE ONE TABLET BY MOUTH ONCE DAILY 90 tablet 1     [DISCONTINUED] glipiZIDE (GLUCOTROL) 5 MG tablet TAKE 1 TABLET (5 MG TOTAL) BY MOUTH DAILY. 90 tablet 3     [DISCONTINUED] metFORMIN (GLUCOPHAGE-XR) 500 MG 24 hr tablet Take 4 tablets (2,000 mg total) by mouth daily with breakfast. 120 tablet 5     glipiZIDE (GLUCOTROL XL) 10 MG 24 hr tablet Take 1 tablet (10 mg total) by mouth daily. 90 tablet 0     [START ON 2/23/2018] nicotine (NICODERM CQ) 14 mg/24 hr Place 1 patch on the skin daily. 30 patch 0     nicotine (NICODERM CQ) 21 mg/24 hr Place 1 patch on the skin daily. 30 patch 1     [START ON 4/23/2018] nicotine (NICODERM CQ) 7 mg/24 hr Place 1 patch on the skin daily. 30 patch 0     nortriptyline (PAMELOR) 10 MG capsule Take 3 capsules (30 mg total) by mouth at bedtime. 270 capsule 0     [DISCONTINUED] nortriptyline (PAMELOR) 10 MG capsule Take 3 capsules (30 mg total) by mouth at bedtime. 180 capsule 2     No facility-administered encounter medications on file as of 1/23/2018.      Past Medical History:   Diagnosis Date     Allergic rhinitis     Created by Conversion  Replacement Utility updated for latest IMO load     Anxiety     Created by Conversion  Replacement Utility updated for latest IMO load     Hyperlipidemia     Created by Conversion      Nicotine Dependence     Created by Conversion      Onychomycosis Of The Toenails     Created by Conversion      Type 2 diabetes mellitus     Created by Conversion      Past Surgical History:   Procedure Laterality Date     ID LAP,CHOLECYSTECTOMY      Description: Cholecystectomy Laparoscopic;  Recorded: 12/08/2009;     Social History   Substance Use Topics     Smoking status: Current Every Day Smoker     Packs/day: 1.00     Smokeless tobacco: Never Used     Alcohol use No       Objective / Physical Examination:  Vitals:    01/23/18 0751   BP: 120/68   Pulse: 78   Weight: 204 lb (92.5 kg)     Wt Readings  from Last 3 Encounters:   01/23/18 204 lb (92.5 kg)   05/09/17 211 lb 6.4 oz (95.9 kg)   10/25/16 205 lb (93 kg)     Body mass index is 32.68 kg/(m^2).     General Appearance: Alert and oriented x3, cooperative, affect flat, speech clear, in no apparent distress  Eyes: PERRL, fundi appear clear bilaterally. No AV nicking or microhemmorhages. Conjunctivae clear and sclerae non-icteric  Neck: Supple, trachea midline. No carotid bruits   Lungs: Clear to auscultation bilaterally but breath sounds somewhat distant. Normal inspiratory and expiratory effort  Cardiovascular: Regular rate, normal S1, S2. No murmurs, rubs, or gallops  Extremities: Pulses 2+ and equal throughout. No edema  Diabetic foot exam: Hypertrophic nails   Left: Filament test present  Right: Filament test present      Orders Placed This Encounter   Procedures     Influenza, Seasonal Quad, Preservative Free 36+ Months     Lipid Profile     Microalbumin, Random Urine     Drugs of Abuse 1,Urine     Comprehensive Metabolic Panel   Followup: Return in about 3 months (around 4/23/2018) for Recheck. earlier if needed.        Rosie Dorado, CNP

## 2021-06-16 PROBLEM — Z79.899 CONTROLLED SUBSTANCE AGREEMENT SIGNED: Status: ACTIVE | Noted: 2018-09-24

## 2021-06-17 ENCOUNTER — COMMUNICATION - HEALTHEAST (OUTPATIENT)
Dept: EDUCATION SERVICES | Facility: CLINIC | Age: 61
End: 2021-06-17

## 2021-06-17 NOTE — TELEPHONE ENCOUNTER
Telephone Encounter by Brooke Garces LPN at 6/3/2020  9:43 AM     Author: Brooke Garces LPN Service: -- Author Type: Certified Medical Assistant    Filed: 6/3/2020  9:48 AM Encounter Date: 6/2/2020 Status: Signed    : Brooke Garces LPN (Licensed Nurse)         Last Office Visit  3/9/2020 Rosie Dorado FNP    Notes:  1. Hospital discharge follow-up  2. Pyogenic arthritis of right hand, due to unspecified organism (H)  3. Cat bite of hand, right, initial encounter  - Inpatient notes reviewed  - Orthopedics requested to see patient back in 5-7 days following discharge but he is not yet scheduled. I will have him make an appointment prior to leaving the clinic, will set for the next 1-2 days  - He is advised to complete full course of antibiotics. May need an additional 1 week of treatment if he continues to have drainage. He will monitor for a fever   - Ambulatory referral to Orthopedics     4. Diabetes  - Continue with glipizide only and monitor home glucose readings  - Repeat A1C in 6 months        Last Filled:  LORazepam (ATIVAN) 0.5 MG tablet  15 tablet  0  7/23/2019   No    Sig: TAKE 1 TABLET BY MOUTH ONCE DAILY AS NEEDED FOR ANXIETY    Sent to pharmacy as: LORazepam (ATIVAN) 0.5 MG tablet    E-Prescribing Status: Receipt confirmed by pharmacy (7/23/2019  3:35 PM CDT)          Lab Results   Component Value Date    AMPHET Screen Negative 04/01/2019    HEBENZODIAZ Screen Negative 04/01/2019    OPIATES Screen Negative 04/01/2019    PCP Screen Negative 04/01/2019    THC Screen Negative 04/01/2019    BARBIT Screen Negative 04/01/2019    COCAINEMETAB Screen Negative 04/01/2019    OXYCODONE Screen Negative 04/01/2019    CREAUR 105.1 04/01/2019    CREAUR 105.1 04/01/2019         Next OV:  Visit date not found      Encounter-Level CSA Scan Date - 09/24/2018:    Scan on 9/26/2018  4:14 PM          reviewed and results are as follows:        Medication teed up for provider signature - please  adjust as appropriate.

## 2021-06-17 NOTE — TELEPHONE ENCOUNTER
See if he is open to a weekly injection in the abdomen, Trulicity.  I would put in a referral to the diabetic educators to teach him how to do the injection which is very easy.  This would likely be a less expensive option.  If he is okay with this, I will send medication to his pharmacy and then placed the referral to the diabetic educators who will call him to set up an appointment

## 2021-06-17 NOTE — TELEPHONE ENCOUNTER
Call pt- let him know I typed the wrong brand name, it is Rybelsus that he will . He will start with 3 mg daily x 30 days then increase the dose to 7 mg daily thereafter. I sent both doses to his pharmacy. He should follow up in the office in 3 months

## 2021-06-17 NOTE — TELEPHONE ENCOUNTER
Called and lvm for patient stating refill was sent and he is due for an appt.    Please assist with scheduling

## 2021-06-17 NOTE — TELEPHONE ENCOUNTER
"Reason for Call:  Medication or medication refill:    Do you use a [facility]?  Name of the pharmacy and phone number for the current request: [facility] on file    Name of the medication requested:     Glipizide 10 mg    Other request: Patient is completely out of meds at this time.  States pharmacy has \"made multiple attempts to reach us\".  Writer sees nothing in chart from pharmacy.  Please expedite if possible.    Can we leave a detailed message on this number? Yes    Phone number patient can be reached at:   Cell number on file:    Telephone Information:   Mobile [phone]       Best Time: Any time    Call taken on 5/3/2021 at 3:59 PM by Mikel Palmer  "

## 2021-06-17 NOTE — TELEPHONE ENCOUNTER
Rosie Dorado    Patient is calling because the prescriptions for Ozempic and Rybelsus are too expensive for him.  He said that they are over 200 dollars a piece.  He is wondering if you can change these medication.  He would like a call back at .

## 2021-06-17 NOTE — TELEPHONE ENCOUNTER
Called and lvm for patient to call back for message from provider.    Please relay and assist as needed

## 2021-06-17 NOTE — PROGRESS NOTES
Internal Medicine Office Visit  [facility]   Patient Name: Remi Mathias  Patient Age: 61 y.o.  YOB: 1960  MRN: [id number]    Date of Visit: 5/12/2021  Reason for Office Visit:   Chief Complaint   Patient presents with     Medication Management           Assessment / Plan / Medical Decision Making:    Problem List Items Addressed This Visit     Essential hypertension, benign     Stable          Relevant Orders    Basic Metabolic Panel    Hyperlipidemia    Relevant Medications    atorvastatin (LIPITOR) 20 MG tablet    Nicotine Dependence     Reviewed options. He would like to try nicotine patches         Relevant Medications    nicotine (NICODERM CQ) 21 mg/24 hr    nicotine (NICODERM CQ) 14 mg/24 hr    nicotine (NICODERM CQ) 7 mg/24 hr    Type 2 diabetes mellitus (H)     Stable, continue current medication         Relevant Medications    atorvastatin (LIPITOR) 20 MG tablet    glipiZIDE (GLUCOTROL XL) 10 MG 24 hr tablet    Other Relevant Orders    Glycosylated Hemoglobin A1c (Completed)           I have changed Mahendra Mathias's glipiZIDE. I am also having him start on nicotine, nicotine, and nicotine. Additionally, I am having him maintain his blood-glucose meter, blood glucose test, blood glucose test, acetaminophen, fluticasone propionate, escitalopram oxalate, lisinopriL, LORazepam, and atorvastatin.            Orders Placed This Encounter   Procedures     Glycosylated Hemoglobin A1c     Basic Metabolic Panel   Followup: Return in about 6 months (around 11/12/2021) for Next scheduled follow up. earlier if needed.        Rosie Dorado CNP        HPI:  Remi Mathias is a 61 y.o. year old who presents to the office today for follow up.     ARIANA was reviewed. He rarely takes lorazepam, he hasn't taken it for a couple of months. Mood has been good, no concerns.     DM was reviewed. He has not checked his glucose at all recently. He admits to drinking a lot of Mt. Dew.      Tobacco use was reviewed. His wife is in the hospital with COPD exacerbation so he plans to quit smoking with her.       Health Maintenance Review  Health Maintenance   Topic Date Due     PREVENTIVE CARE VISIT  Never done     Pneumococcal Vaccine: Pediatrics (0 to 5 Years) and At-Risk Patients (6 to 64 Years) (1 of 2 - PPSV23) Never done     COVID-19 Vaccine (1) Never done     ZOSTER VACCINES (1 of 2) Never done     DIABETIC EYE EXAM  04/01/2020     BMP  02/28/2021     ADVANCE CARE PLANNING  10/25/2021     LIPID  10/05/2021     MICROALBUMIN  10/05/2021     A1C  11/12/2021     DIABETIC FOOT EXAM  05/12/2022     COLORECTAL CANCER SCREENING  10/25/2026     TD 18+ HE  10/05/2030     INFLUENZA VACCINE RULE BASED  Completed     TDAP ADULT ONE TIME DOSE  Completed     HEPATITIS C SCREENING  Discontinued     HIV SCREENING  Discontinued       Current Scheduled Meds:  Outpatient Encounter Medications as of 5/12/2021   Medication Sig Dispense Refill     acetaminophen (TYLENOL) 325 MG tablet Take 2 tablets (650 mg total) by mouth every 4 (four) hours as needed.  0     blood glucose test strips Check blood sugar twice daily. Dispense brand per patient's insurance at pharmacy discretion. 100 strip 11     blood glucose test strips Check 1-2 times per day. Dx:e11.9 100 strip 11     blood-glucose meter Misc Check blood sugar twice daily. Dispense meter per insurance/patient preference 1 each 0     escitalopram oxalate (LEXAPRO) 20 MG tablet Take 1 tablet (20 mg total) by mouth daily. 90 tablet 1     fluticasone propionate (FLONASE) 50 mcg/actuation nasal spray 1 spray into each nostril daily as needed.       lisinopriL (ZESTRIL) 2.5 MG tablet Take 1 tablet (2.5 mg total) by mouth daily. 90 tablet 3     LORazepam (ATIVAN) 0.5 MG tablet TAKE 1 TABLET BY MOUTH ONCE DAILY AS NEEDED FOR ANXIETY 15 tablet 0     [DISCONTINUED] atorvastatin (LIPITOR) 20 MG tablet Take 1 tablet (20 mg total) by mouth daily. 90 tablet 3     [DISCONTINUED]  "glipiZIDE (GLUCOTROL XL) 10 MG 24 hr tablet Take 2 tablets (20 mg total) by mouth daily. Due for appointment 60 tablet 0     atorvastatin (LIPITOR) 20 MG tablet Take 1 tablet (20 mg total) by mouth daily. 90 tablet 3     glipiZIDE (GLUCOTROL XL) 10 MG 24 hr tablet Take 2 tablets (20 mg total) by mouth daily. 180 tablet 1     nicotine (NICODERM CQ) 14 mg/24 hr Place 1 patch on the skin daily. 30 patch 0     nicotine (NICODERM CQ) 21 mg/24 hr Place 1 patch on the skin daily. 30 patch 0     nicotine (NICODERM CQ) 7 mg/24 hr Place 1 patch on the skin daily. 30 patch 0     No facility-administered encounter medications on file as of 5/12/2021.          Objective / Physical Examination:  Vitals:    05/12/21 1427   BP: 120/74   Pulse: 66   Weight: 212 lb (96.2 kg)   Height: 5' 7\" (1.702 m)     Wt Readings from Last 3 Encounters:   05/12/21 212 lb (96.2 kg)   10/05/20 210 lb (95.3 kg)   03/09/20 214 lb (97.1 kg)     Body mass index is 33.2 kg/m .     Constitutional: In no apparent distress  Eyes: PERRL, fundi appear clear bilaterally. No AV nicking or microhemmorhages. Conjunctivae clear. Non-icteric.   ENT: Tympanic membrane clear with landmarks well visualized bilaterally. Septum midline, nares patent, no visible polyps, mucosa moist and without drainage. Lips and mucosa moist. Pharynx without erythema or exudate. Neck is supple, trachea midline. No cervical adenopathy  Respiratory: Clear to auscultation bilaterally. Normal inspiratory and expiratory effort  Cardiovascular: Regular rate and rhythm. No murmurs, rubs, or gallops. No edema. No carotid bruits.   Diabetic foot exam: normal DP and PT pulses, no trophic changes or ulcerative lesions and normal sensory exam      "

## 2021-06-17 NOTE — TELEPHONE ENCOUNTER
----- Message from KOTA Saunders sent at 5/12/2021  5:03 PM CDT -----  Call patient: His A1c is elevated compared to the last check in October at 7.4%.  This is not at goal.  I think we should start another medication to help with diabetes.  Instead of the medication he took previously, I am thinking of the medication Ozempic which can help with weight loss and help to get blood sugars under better control.  If this is okay with you, please send a message back to me and I will send it to your pharmacy.

## 2021-06-17 NOTE — TELEPHONE ENCOUNTER
Pt returned call.  Wants to know the price, encouraged pt to reach out to his insurance and pharmacy for specifics.    He is interested in pursing the option of Trulicity  RX and referral with [facility].

## 2021-06-18 ENCOUNTER — COMMUNICATION - HEALTHEAST (OUTPATIENT)
Dept: INTERNAL MEDICINE | Facility: CLINIC | Age: 61
End: 2021-06-18

## 2021-06-18 DIAGNOSIS — F41.1 ANXIETY STATE: ICD-10-CM

## 2021-06-18 RX ORDER — ESCITALOPRAM OXALATE 20 MG/1
TABLET ORAL
Qty: 90 TABLET | Refills: 3 | Status: SHIPPED | OUTPATIENT
Start: 2021-06-18 | End: 2021-12-09

## 2021-06-19 NOTE — LETTER
Letter by Rosie Dorado FNP at      Author: Rosie Dorado FNP Service: -- Author Type: --    Filed:  Encounter Date: 10/8/2019 Status: Signed         Remi Maincaranoem[address]  [address] [place] [id number]             October 8, 2019         Dear Mr. Mathias,    Below are the results from your recent visit:    Resulted Orders   Glycosylated Hemoglobin A1c   Result Value Ref Range    Hemoglobin A1c 6.4 (H) 3.5 - 6.0 %     Your A1C is much better than the last check! This indicates that your diabetes is currently well controlled.     Please call with questions or contact us using [facility].    Sincerely,        Electronically signed by KOTA East

## 2021-06-20 NOTE — LETTER
Letter by Rosie Dorado FNP at      Author: Rosie Dorado FNP Service: -- Author Type: --    Filed:  Encounter Date: 10/6/2020 Status: (Other)         Remi Mathias  [address] [place] [id number]             October 6, 2020         Dear Mr. Mathias,    Below are the results from your recent visit:    Resulted Orders   Glycosylated Hemoglobin A1c   Result Value Ref Range    Hemoglobin A1c 6.7 (H) <=5.6 %      Comment:      Normal <5.7% Prediabete 5.7-6.4% Diabletes 6.5% or higher - adopted from ADA consensus guidelines   Lipid Profile   Result Value Ref Range    Triglycerides 256 (H) <=149 mg/dL    Cholesterol 219 (H) <=199 mg/dL    LDL Calculated 122 <=129 mg/dL    HDL Cholesterol 46 >=40 mg/dL    Patient Fasting > 8hrs? No    ALT (SGPT)   Result Value Ref Range    ALT 17 0 - 45 U/L   Microalbumin, Random Urine   Result Value Ref Range    Microalbumin, Random Urine 3.36 (H) 0.00 - 1.99 mg/dL    Creatinine, Urine 182.5 mg/dL    Microalbumin/Creatinine Ratio Random Urine 18.4 <=19.9 mg/g    Narrative    Microalbumin, Random Urine  <2.0 mg/dL . . . . . . . . Normal  3.0-30.0 mg/dL . . . . . . Microalbuminuria  >30.0 mg/dL . . . . . .  . Clinical Proteinuria    Microalbumin/Creatinine Ratio, Random Urine  <20 mg/g . . . . .. . . . Normal   mg/g . . . . . . . Microalbuminuria  >300 mg/g . . . . . . . . Clinical Proteinuria       Drugs of Abuse 1,Urine   Result Value Ref Range    Amphetamines Screen Negative Screen Negative    Benzodiazepines Screen Negative Screen Negative    Opiates Screen Negative Screen Negative    Phencyclidine Screen Negative Screen Negative    THC Screen Negative Screen Negative    Barbiturates Screen Negative Screen Negative    Cocaine Metabolite Screen Negative Screen Negative    Oxycodone Screen Negative Screen Negative    Creatinine, Urine 182.5 mg/dL    Narrative    Drug                  Screening Threshold    Amphetamines           1000 ng/mL  Benzodiazepine           200 ng/mL  Opiates                 300 ng/mL  Phencyclidine            25 ng/mL  THC Metabolite           50 ng/mL  Barbiturates            200 ng/mL  Cocaine Metabolite      150 ng/mL  Oxycodone               100 ng/mL    Screening results are to be used only for medical purposes.  Unconfirmed screening results must not be used for non-  medical purposes.     Your A1C still shows that diabetes is well controlled but has increased compared to the last visit. Try to exercise 30 minutes most days of the week.     There is excess protein in the urine with this test. It is indicated to start lisinopril 2.5 mg daily to help protect the kidneys since you have diabetes. I sent this to your pharmacy. It is taken once daily. Monitor possible side effect of a dry cough or lightheadedness but this is a very low dose.     Liver function tests are normal.     Cholesterol tests are elevated. I suggest that you restart atorvastatin but we can try a lower dose than you took in the past, 20 mg daily.     Please call with questions or contact us using [facility].    Sincerely,        Electronically signed by KOTA East

## 2021-06-20 NOTE — PROGRESS NOTES
Internal Medicine Office Visit  [facility]  Patient Name: Remi Mathias  Patient Age: 58 y.o.  YOB: 1960  MRN: [id number]    Date of Visit: 2018  Reason for Office Visit:   Chief Complaint   Patient presents with     Medication Management           Assessment / Plan / Medical Decision Makin. Type 2 diabetes mellitus (H)  - Glycosylated Hemoglobin A1c, recheck today. Encouraged him to check glucose readings at home   - reduce metformin to 500 mg twice daily due to loose stools which could be due to this medication   - blood-glucose meter Misc; Check blood sugar twice daily. Dispense meter per insurance/patient preference  Dispense: 1 each; Refill: 0  - blood glucose test strips; Check blood sugar twice daily. Dispense brand per patient's insurance at pharmacy discretion.  Dispense: 100 strip; Refill: 11  - metFORMIN (GLUCOPHAGE-XR) 500 MG 24 hr tablet; Take 2 tablets (1,000 mg total) by mouth 2 (two) times a day.  Dispense: 360 tablet; Refill: 1    2. Anxiety, insomnia   - escitalopram oxalate (LEXAPRO) 20 MG tablet; Take 1 tablet (20 mg total) by mouth daily.  Dispense: 90 tablet; Refill: 1    3. Colon cancer screening  4. Diarrhea  - Ambulatory referral for Colonoscopy    5. Hyperlipidemia  - Continue atorvastatin 40 mg daily     6. Nicotine Dependence  - He is commended for cessation efforts and plans to use nicotine patch which he already has at home     7. Controlled substance agreement signed, lorazepam 2018  - Continue escitalopram and lorazepam sparingly       Health Maintenance Review  Health Maintenance   Topic Date Due     DIABETES OPHTHALMOLOGY EXAM  1968     ZOSTER VACCINE  2018     INFLUENZA VACCINE RULE BASED (1) 2018     TD 18+ HE  2019     DIABETES FOOT EXAM  2019     DIABETES URINE MICROALBUMIN  2019     DIABETES HEMOGLOBIN A1C  2019     DIABETES FOLLOW-UP  2019     ADVANCE  DIRECTIVES DISCUSSED WITH PATIENT  10/25/2021     COLONOSCOPY  10/25/2026     TDAP ADULT ONE TIME DOSE  Completed         I have discontinued Mr. Mathias's nortriptyline. I have also changed his metFORMIN. Additionally, I am having him start on blood-glucose meter, blood glucose test, and fluticasone. Lastly, I am having him maintain his blood glucose test, metFORMIN, atorvastatin, glipiZIDE, LORazepam, aspirin, and escitalopram oxalate.      HPI:  Remi Mathias is a 58 y.o. year old who presents to the office today for follow up.     Diabetes reviewed. He is not checking his blood sugar, lost his meter. He increased glipizide to 10 mg daily. He does not recall what his blood sugar readings were initially with this change. He still has loose stools daily due to metformin even with splitting the dose. Walks daily at his work. Due for diabetic eye exam, he has a eye provider that he sees regularly.     Diarrhea- has 3 loose stools per day, happens 1 hour after eating. Overdue for colon cancer screening. We discussed that he is not a good candidate for stool testing since he has now a consistent stool change where colonoscopy is recommended. Agrees to do colonoscopy and will order this with random biopsies.     Insomnia and anxiety- he takes escitalopram daily. Takes lorazepam sparingly, 3 per month typically.     Smoking reviewed- smokes 1 pack per day. Plannng to start using nicotine patch.     He has some tightness and pain in the fingers. This occurs most often in the morning but is there throughout the day. R>L, he is right handed.     Review of Systems- pertinent positive in bold:  Constitutional: Fever, chills, night sweats, fainting, weight change, fatigue, seizures, dizziness, sleeping difficulties, loud snoring/pauses in breathing  Eyes: change in vision, blurred or double vision, redness/eye pain  Ears, nose, mouth, throat: change in hearing, ear pain, hoarseness, difficulty swallowing, sores in the  mouth or throat  Respiratory: shortness of breath, cough, bloody sputum, wheezing  Cardiovascular: chest pain, palpitations   Gastrointestinal: abdominal pain, heartburn/indigestion, nausea/vomiting, change in appetite, change in bowel habits, constipation or diarrhea, rectal bleeding/dark stools, difficulty swallowing  Urinary: painful urination, frequent urination, urinary urgency/incontinence, blood in urine/dark urine, nocturia  MEN: pain/lump in testicles, difficulty with erections, problems with sexual function  Musculoskeletal: backache/back pain (new or increasing), weakness, joint pain/stiffness (new or increasing), muscle cramps, swelling of hands, feet, ankles, leg pain/redness  Skin: change in moles/freckles, rash, nodules  Hematologic/lymphatic: swollen lymph glands, abnormal bruising/bleeding  Endocrine: excessive thirst/urination, cold or heat intolerance  Breast: breast lump, breast pain, nipple discharge/skin changes  Neurologic/emotional: worrisome memory change, numbness/tingling, anxiety, mood swings      Current Scheduled Meds:  Outpatient Encounter Prescriptions as of 9/24/2018   Medication Sig Dispense Refill     aspirin 81 MG EC tablet Take 81 mg by mouth daily.       atorvastatin (LIPITOR) 40 MG tablet Take 1 tablet (40 mg total) by mouth daily. 90 tablet 3     blood glucose test strips Check 1-2 times per day. 100 strip 3     escitalopram oxalate (LEXAPRO) 20 MG tablet Take 1 tablet (20 mg total) by mouth daily. 90 tablet 1     glipiZIDE (GLUCOTROL XL) 10 MG 24 hr tablet Take 1 tablet (10 mg total) by mouth daily. 90 tablet 2     LORazepam (ATIVAN) 0.5 MG tablet TAKE ONE TABLET BY MOUTH ONCE DAILY AS NEEDED FOR ANXIETY (NEED  TO  BE  SEEN  FOR  FURTHER  REFILLS) 15 tablet 0     metFORMIN (GLUCOPHAGE-XR) 500 MG 24 hr tablet Take 2 tablets (1,000 mg total) by mouth 2 (two) times a day. 360 tablet 0     metFORMIN (GLUCOPHAGE-XR) 500 MG 24 hr tablet Take 2 tablets (1,000 mg total) by mouth 2  (two) times a day. 360 tablet 1     [DISCONTINUED] escitalopram oxalate (LEXAPRO) 20 MG tablet Take 1 tablet (20 mg total) by mouth daily. 90 tablet 1     [DISCONTINUED] metFORMIN (GLUCOPHAGE-XR) 500 MG 24 hr tablet TAKE FOUR TABLETS BY MOUTH ONCE DAILY WITH BREAKFAST. 120 tablet 0     [DISCONTINUED] nortriptyline (PAMELOR) 10 MG capsule Take 3 capsules (30 mg total) by mouth at bedtime. 270 capsule 0     blood glucose test strips Check blood sugar twice daily. Dispense brand per patient's insurance at pharmacy discretion. 100 strip 11     blood-glucose meter Misc Check blood sugar twice daily. Dispense meter per insurance/patient preference 1 each 0     fluticasone (FLONASE) 50 mcg/actuation nasal spray 1 spray into each nostril daily. 16 g 2     No facility-administered encounter medications on file as of 9/24/2018.      Past Medical History:   Diagnosis Date     Allergic rhinitis     Created by Conversion  Replacement Utility updated for latest IMO load     Anxiety     Created by Conversion  Replacement Utility updated for latest IMO load     Hyperlipidemia     Created by Conversion      Nicotine Dependence     Created by Conversion      Onychomycosis Of The Toenails     Created by Conversion      Type 2 diabetes mellitus (H)     Created by Conversion      Past Surgical History:   Procedure Laterality Date     NE LAP,CHOLECYSTECTOMY      Description: Cholecystectomy Laparoscopic;  Recorded: 12/08/2009;     Social History   Substance Use Topics     Smoking status: Current Every Day Smoker     Packs/day: 1.00     Smokeless tobacco: Never Used     Alcohol use No       Objective / Physical Examination:  Vitals:    09/24/18 0812   BP: 122/72   Pulse: 80   Weight: 211 lb (95.7 kg)     Wt Readings from Last 3 Encounters:   09/24/18 211 lb (95.7 kg)   01/23/18 204 lb (92.5 kg)   05/09/17 211 lb 6.4 oz (95.9 kg)     Body mass index is 33.8 kg/(m^2).     General Appearance: Alert and oriented, cooperative, affect  appropriate, speech clear, in no apparent distress  Neck: Supple, trachea midline. No carotid bruits   Lungs: Clear to auscultation bilaterally. Normal inspiratory and expiratory effort  Cardiovascular: Regular rate, normal S1, S2. No murmurs, rubs, or gallops  Extremities: No edema    Orders Placed This Encounter   Procedures     Influenza, Seasonal,Quad Inj, 36+ MOS     Glycosylated Hemoglobin A1c     Ambulatory referral for Colonoscopy   Followup: Return in about 6 months (around 3/24/2019) for  In 3 months if A1C is >7.0%. earlier if needed. In 3 months if A1C is >7.0%        Rosie Dorado, CNP

## 2021-06-20 NOTE — LETTER
Letter by Thomas Garcia MD at      Author: Thomas Garcia MD Service: -- Author Type: --    Filed:  Encounter Date: 9/10/2020 Status: (Other)       Remi Mathias  [address] [address]      09/16/20      Dear Mahendra,      In reviewing your records, we have determined a medication check is needed before your next refill, please call the [facility] to schedule an appointment.      Medication check/review      We have made attempts to call you for an appointment, please verify your contact information is correct when calling back for an appointment, or if you have transferred your care to another clinic, please contact us so we can update our records.     Please call [phone] to schedule an appointment.    We believe that a strong preventative care program, including regular physicals and follow-up care is an important part of a healthy lifestyle and we are committed to helping you maintain your health.    Thank you for choosing us as your health care provider.    Sincerely,    Fiordaliza Olivia   CMA - CMT/CA  [facility]  [address] [place] [id number]  [phone]

## 2021-06-21 NOTE — LETTER
Letter by Rosie Dorado FNP at      Author: Rosie Dorado FNP Service: -- Author Type: --    Filed:  Encounter Date: 5/12/2021 Status: (Other)       Non-Opioid Controlled Substance Agreement    This is an agreement between you and your provider regarding safe and appropriate controlled substance prescribing.? Controlled substances are?medicines that can cause physical and mental dependence. The manufacturing, possession and use of these medicines are regulated by law.  We here at [facility] are making a commitment to work with you in your efforts to get better.? To support you in this work, we will help you schedule regular appointments for medicine refills. If we must cancel or change your appointment for any reason, we will make sure you have enough medication to last until your next appointment.      As a Provider, I will:     Listen carefully to your concerns while treating you with dignity.     Recommend a treatment plan that I believe is in your best interest and may involve therapies other than medication.      Review the chance of benefit and the chance of harm of this medicine with you regularly and evaluate the safety and effectiveness of this therapy.       Provide a plan on how to discontinue if the decision is made to stop this medicine.       As a Patient, I understand controlled substances:       Are prescribed by my care provider to help me function or work and manage my condition(s).?    Are strong medicines and can cause serious side effects.      Need to be taken exactly as prescribed.?Combining controlled substances with certain medicines or chemicals (such as illegal drugs, alcohol, sedatives, sleeping pills, and benzodiazepines) can be dangerous or even fatal.? If I stop taking my medicines suddenly, I may have severe withdrawal symptoms.     The risks, benefits, and side effects of these medicine(s) were explained to me. I agree that:    1. I will take part in other  treatments as advised by my care team. This may be psychiatry or counseling, physical therapy, behavioral therapy, group treatment or a referral to specialist.    2. I will keep all my appointments and understand this is part of the monitoring of controlled substance.?My care team may require an office visit for EVERY controlled substance refill. If I miss appointments or dont follow instructions, my care team may stop my medicine    3. I will take my medicines as prescribed. I will not change the dose or schedule unless my care team tells me to. There will be no refills if I run out early.      4. I may be contactedwithout warning and asked to complete a urine drug test or pill count at any time. If I dont give a urine sample or participate in a pill count, the care team may stop my medicine.    5. I will only receive controlled substance prescriptions from this clinic. If treated by another provider, I will let them know I am taking controlled substances and that I have a treatment agreement with this provider. I will inform this care team within one business day if I am given a prescription for any controlled substance by another healthcare provider. This care team may contact other providers and pharmacists about my use of the medicines.     6. It is up to me to make sure that I do not run out of my medicines on weekends or holidays.?If my care team is willing to refill my prescription without a visit, I must request refills only during office hours. Refills may take up to 3 business days to process.  I will use one pharmacy to fill all my controlled substance prescriptions.  I will notify the clinic if any changes are made due to insurance changes or medication availability.    7. I am responsible for my prescriptions. If the medicine/prescription is lost, stolen or destroyed, it will not be replaced.?I also agree not to share controlled substance medicines with anyone.     8. I am aware I should not use any  illegal or recreational drugs. I agree not to drink alcohol unless my care team says I can.     9. If I enroll in the [place] Medical Cannabis program, I will tell my care team.?    10. I will tell my care team right away if I become pregnant, have a new medical problem treated outside of my regular clinic, or have a change in my medications.     11. I understand that this medicine can affect my thinking, judgment and reaction time.? Alcohol and drugs affect the brain and body, which can affect the safety of a persons driving. Being under the influence of alcohol or drugs can affect a persons decision-making, behaviors, personal safety, and the safety of others. Driving while impaired (DWI) can occur if a person is driving, operating, or in physical control of a car, motorcycle, boat, snowmobile, ATV, motorbike, off-road vehicle, or any other motor vehicle ([place] Statute 169A.20). I understand the risk if I choose to drive or operate machinery  I understand that if I do not follow any of the conditions above, my prescriptions or treatment may be stopped or changed.     I agree that my provider, clinic care team, and pharmacy may work with any city, state or federal law enforcement agency that investigates the misuse, sale, or other diversion of my controlled medicine. I will allow my provider to discuss my care with or share a copy of this agreement with any other treating provider, pharmacy or emergency room where I receive care.?    I have read this agreement and have asked questions about anything I did not understand.    ________________________________________________________  Patient Signature - Remi Mathias     ___________________                   Date     ________________________________________________________  Provider Signature - KOTA East       ___________________                   Date     ________________________________________________________  Witness Signature (required if provider  not present while patient signing)          ___________________                   Date

## 2021-06-21 NOTE — LETTER
Letter by Rosie Dorado FNP at      Author: Rosie Dorado FNP Service: -- Author Type: --    Filed:  Encounter Date: 5/13/2021 Status: (Other)         Remi Mathias  [address] [address]             May 13, 2021         Dear Mr. Mathias,    Below are the results from your recent visit:    Resulted Orders   Glycosylated Hemoglobin A1c   Result Value Ref Range    Hemoglobin A1c 7.4 (H) <=5.6 %   Basic Metabolic Panel   Result Value Ref Range    Sodium 141 136 - 145 mmol/L    Potassium 4.0 3.5 - 5.0 mmol/L    Chloride 107 98 - 107 mmol/L    CO2 21 (L) 22 - 31 mmol/L    Anion Gap, Calculation 13 5 - 18 mmol/L    Glucose 124 70 - 125 mg/dL    Calcium 8.9 8.5 - 10.5 mg/dL    BUN 12 8 - 22 mg/dL    Creatinine 0.84 0.70 - 1.30 mg/dL    GFR MDRD Af Amer >60 >60 mL/min/1.73m2    GFR MDRD Non Af Amer >60 >60 mL/min/1.73m2    Narrative    Fasting Glucose reference range is 70-99 mg/dL per  American Diabetes Association (ADA) guidelines.     Here is a copy of your lab results.  Your kidney function electrolytes are normal.  Your A1c is elevated, you should have received a call from the office about a medication change.  If you have not yet heard from us, please call the office so that we can discuss this with you further.    Please call with questions or contact us using [email or web address].    Sincerely,        Electronically signed by KOTA East

## 2021-06-23 NOTE — TELEPHONE ENCOUNTER
Refill Approved    Rx renewed per Medication Renewal Policy. Medication was last renewed on 9/26/18.    Rukhsana Chacko, [facility] Triage/Med Refill 1/19/2019     Requested Prescriptions   Pending Prescriptions Disp Refills     glipiZIDE (GLUCOTROL XL) 10 MG 24 hr tablet [Pharmacy Med Name: GLIPIZIDE XL 10MG   TAB] 180 tablet 0     Sig: TAKE 2 TABLETS BY MOUTH ONCE DAILY    Oral Hypoglycemics Refill Protocol Passed - 1/19/2019  9:01 AM       Passed - Visit with PCP or prescribing provider visit in last 6 months      Last office visit with prescriber/PCP: 9/24/2018 OR same dept: 9/24/2018 Rosie Dorado FNP OR same specialty: 9/24/2018 Rosie Dorado FNP Last physical: Visit date not found Last MTM visit: Visit date not found         Next appt within 3 mo: Visit date not found  Next physical within 3 mo: Visit date not found  Prescriber OR PCP: KOTA East  Last diagnosis associated with med order: There are no diagnoses linked to this encounter.   If protocol passes may refill for 12 months if within 3 months of last provider visit (or a total of 15 months).          Passed - A1C in last 6 months    Hemoglobin A1c   Date Value Ref Range Status   09/24/2018 7.3 (H) 3.5 - 6.0 % Final              Passed - Microalbumin in last year     Microalbumin, Random Urine   Date Value Ref Range Status   01/23/2018 0.87 0.00 - 1.99 mg/dL Final                 Passed - Blood pressure in last year    BP Readings from Last 1 Encounters:   09/24/18 122/72            Passed - Serum creatinine in last year    Creatinine   Date Value Ref Range Status   01/23/2018 0.77 0.70 - 1.30 mg/dL Final

## 2021-06-25 NOTE — TELEPHONE ENCOUNTER
"Pt calls re diabetic meds.  Asks if he should continue glipizide now that Trulicity has been added.  Answer -> yes, per chart notes of 5/12/21 and refills transmitted to his pharmacy by PCP.    Referral was also entered for diabetic education.  Pt reports never receiving a call for an appt.  Therefore now warm-transferring to diabetic scheduling line -> [phone].  Reached staffer who states \"We actually tried calling him several times.\"  Pt agrees to schedule with a diabetic educator per PCP's recommendation.    No further questions at this time.    Thais Johnson RN  [facility] Triage     Reason for Disposition    Caller has medication question only, adult not sick, and triager answers question    Protocols used: MEDICATION QUESTION CALL-A-[place]    _________________________    COVID 19 Nurse Triage Plan/Patient Instructions    Please be aware that novel coronavirus (COVID-19) may be circulating in the community. If you develop symptoms such as fever, cough, or SOB or if you have concerns about the presence of another infection including coronavirus (COVID-19), please contact your health care provider or visit  [email or web address].    Disposition/Instructions    Additional COVID19 information to add for patients.   How can I protect others?  If you have symptoms (fever, cough, body aches or trouble breathing): Stay home and away from others (self-isolate) until:    At least 10 days have passed since your symptoms started, And     You ve had no fever--and no medicine that reduces fever--for 1 full day (24 hours), And      Your other symptoms have resolved (gotten better).     If you don t have symptoms, but a test showed that you have COVID-19 (you tested positive):    Stay home and away from others (self-isolate). Follow the tips under \"How do I self-isolate?\" below for 10 days (20 days if you have a weak immune system).    You don't need to be retested for COVID-19 before going back to school or " work. As long as you're fever-free and feeling better, you can go back to school, work and other activities after waiting the 10 or 20 days.     How do I self-isolate?    Stay in your own room, even for meals. Use your own bathroom if you can.     Stay away from others in your home. No hugging, kissing or shaking hands. No visitors.    Don t go to work, school or anywhere else.     Clean  high touch  surfaces often (doorknobs, counters, handles, etc.). Use a household cleaning spray or wipes. You ll find a full list on the EPA website:  www.epa.gov/pesticide-registration/list-n-disinfectants-use-against-sars-cov-2.    Cover your mouth and nose with a mask, tissue or washcloth to avoid spreading germs.    Wash your hands and face often. Use soap and water.    Caregivers in these groups are at risk for severe illness due to COVID-19:  o People 65 years and older  o People who live in a nursing home or long-term care facility  o People with chronic disease (lung, heart, cancer, diabetes, kidney, liver, immunologic)  o People who have a weakened immune system, including those who:  - Are in cancer treatment  - Take medicine that weakens the immune system, such as corticosteroids  - Had a bone marrow or organ transplant  - Have an immune deficiency  - Have poorly controlled HIV or AIDS  - Are obese (body mass index of 40 or higher)  - Smoke regularly    Caregivers should wear gloves while washing dishes, handling laundry and cleaning bedrooms and bathrooms.    Use caution when washing and drying laundry: Don t shake dirty laundry, and use the warmest water setting that you can.    For more tips, go to www.cdc.gov/coronavirus/2019-ncov/downloads/10Things.pdf.    How can I take care of myself?  1. Get lots of rest. Drink extra fluids (unless a doctor has told you not to).     2. Take Tylenol (acetaminophen) for fever or pain. If you have liver or kidney problems, ask your family doctor if it s okay to take Tylenol.      Adults can take either:     650 mg (two 325 mg pills) every 4 to 6 hours, or     1,000 mg (two 500 mg pills) every 8 hours as needed.     Note: Don t take more than 3,000 mg in one day.   Acetaminophen is found in many medicines (both prescribed and over-the-counter medicines). Read all labels to be sure you don t take too much.     For children, check the Tylenol bottle for the right dose. The dose is based on the child s age or weight.    3. If you have other health problems (like cancer, heart failure, an organ transplant or severe kidney disease): Call your specialty clinic if you don t feel better in the next 2 days.    4. Know when to call 911: Emergency warning signs include:    Trouble breathing or shortness of breath    Pain or pressure in the chest that doesn t go away    Feeling confused like you haven t felt before, or not being able to wake up    Bluish-colored lips or face    What are the symptoms of COVID-19?     The most common symptoms are cough, fever and trouble breathing.     Less common symptoms include body aches, chills, diarrhea (loose, watery poops), fatigue (feeling very tired), headache, runny nose, sore throat and loss of smell.    COVID-19 can cause severe coughing (bronchitis) and lung infection (pneumonia).    How does it spread?     The virus may spread when a person coughs or sneezes into the air. The virus can travel about 6 feet this way, and it can live on surfaces.      Common  (household disinfectants) will kill the virus.    Who is at risk?  Anyone can catch COVID-19 if they re around someone who has the virus.    How can others protect themselves?     Stay away from people who have COVID-19 (or symptoms of COVID-19).    Wash hands often with soap and water. Or, use hand  with at least 60% alcohol.    Avoid touching the eyes, nose or mouth.     Wear a face mask when you go out in public, when sick or when caring for a sick person.    Where can I get more  information?    [facility]: About COVID-19: [email or web address]    CDC: What to Do If You re Sick: www.cdc.gov/coronavirus/2019-ncov/about/steps-when-sick.html    CDC: Ending Home Isolation: www.cdc.gov/coronavirus/2019-ncov/hcp/disposition-in-home-patients.html     CDC: Caring for Someone: www.cdc.gov/coronavirus/2019-ncov/if-you-are-sick/care-for-someone.html     [facility]: Interim Guidance for Hospital Discharge to Home: www.[facility]/diseases/coronavirus/hcp/hospdischarge.pdf    [facility] clinical trials (COVID-19 research studies): [email or web address].[facility]-clinical-trials     Below are the COVID-19 hotlines at the [place] Department of [facility] ([facility]). Interpreters are available.   o For health questions: Call 785-511-8188 or 1-167.191.9833 (7 a.m. to 7 p.m.)  o For questions about schools and childcare: Call 549-311-9145 or 1-698.762.5188 (7 a.m. to 7 p.m.)              Thank you for taking steps to prevent the spread of this virus.  o Limit your contact with others.  o Wear a simple mask to cover your cough.  o Wash your hands well and often.    Resources    [facility]: About COVID-19: [email or web address]    CDC: What to Do If You're Sick: www.cdc.gov/coronavirus/2019-ncov/about/steps-when-sick.html    CDC: Ending Home Isolation: www.cdc.gov/coronavirus/2019-ncov/hcp/disposition-in-home-patients.html     CDC: Caring for Someone: www.cdc.gov/coronavirus/2019-ncov/if-you-are-sick/care-for-someone.html     [facility]: Interim Guidance for Hospital Discharge to Home: www.[facility]/diseases/coronavirus/hcp/hospdischarge.pdf    [facility] clinical trials (COVID-19 research studies): clinicalaffairs.[facility]/n-clinical-trials     Below are the COVID-19 hotlines at the [facility] of Health ([facility]). Interpreters are available.   o For health questions: Call 846-796-9287 or 1-233.705.9223 (7 a.m. to 7 p.m.)  o For questions about schools  and childcare: Call 412-280-7660 or 1-197.254.7324 (7 a.m. to 7 p.m.)

## 2021-06-25 NOTE — TELEPHONE ENCOUNTER
Refill Approved    Rx renewed per Medication Renewal Policy. Medication was last renewed on 1/23/2018.    Rivera Galvan, [facility] Connection Triage/Med Refill 3/16/2019     Requested Prescriptions   Pending Prescriptions Disp Refills     atorvastatin (LIPITOR) 40 MG tablet [Pharmacy Med Name: ATORVASTATIN 40MG   TAB] 90 tablet 3     Sig: TAKE ONE TABLET BY MOUTH ONCE DAILY    Statins Refill Protocol (Hmg CoA Reductase Inhibitors) Passed - 3/13/2019  7:56 AM       Passed - PCP or prescribing provider visit in past 12 months     Last office visit with prescriber/PCP: 9/24/2018 Rosie Dorado FNP OR same dept: 9/24/2018 Rosie Dorado FNP OR same specialty: 9/24/2018 Rosie Dorado FNP  Last physical: Visit date not found Last MTM visit: Visit date not found   Next visit within 3 mo: Visit date not found  Next physical within 3 mo: Visit date not found  Prescriber OR PCP: KOTA East  Last diagnosis associated with med order: 1. Type 2 diabetes mellitus (H)  - atorvastatin (LIPITOR) 40 MG tablet [Pharmacy Med Name: ATORVASTATIN 40MG   TAB]; TAKE ONE TABLET BY MOUTH ONCE DAILY  Dispense: 90 tablet; Refill: 3    If protocol passes may refill for 12 months if within 3 months of last provider visit (or a total of 15 months).

## 2021-06-25 NOTE — TELEPHONE ENCOUNTER
Pt was scheduled for a video visit.Tried calling the patient. Left them a voicemail and also the call back number. Please contact patient to reschedule.    Thanks  Linette Hills RDN, LD  Diabetes Care and Education

## 2021-06-26 NOTE — TELEPHONE ENCOUNTER
Refill Approved    Rx renewed per Medication Renewal Policy. Medication was last renewed on 10/5/2020.    Miya Calvin, [facility] Connection Triage/Med Refill 6/18/2021     Requested Prescriptions   Pending Prescriptions Disp Refills     escitalopram oxalate (LEXAPRO) 20 MG tablet [Pharmacy Med Name: Escitalopram Oxalate 20 MG Oral Tablet] 90 tablet 0     Sig: Take 1 tablet by mouth once daily       SSRI Refill Protocol  Passed - 6/18/2021  6:20 AM        Passed - PCP or prescribing provider visit in last year     Last office visit with prescriber/PCP: 5/12/2021 Rosie Dorado FNP OR same dept: 5/12/2021 Rosie Dorado FNP OR same specialty: 5/12/2021 Rosie Dorado FNP  Last physical: Visit date not found Last MTM visit: Visit date not found   Next visit within 3 mo: Visit date not found  Next physical within 3 mo: Visit date not found  Prescriber OR PCP: KOTA East  Last diagnosis associated with med order: 1. Anxiety, insomnia   - escitalopram oxalate (LEXAPRO) 20 MG tablet [Pharmacy Med Name: Escitalopram Oxalate 20 MG Oral Tablet]; Take 1 tablet by mouth once daily  Dispense: 90 tablet; Refill: 0    If protocol passes may refill for 12 months if within 3 months of last provider visit (or a total of 15 months).

## 2021-07-04 NOTE — ADDENDUM NOTE
Addendum Note by Rosie Dorado FNP at 5/14/2021  7:48 AM     Author: Rosie Dorado FNP Service: -- Author Type: Nurse Practitioner    Filed: 5/14/2021  7:48 AM Encounter Date: 5/13/2021 Status: Signed    : Rosie Dorado FNP (Nurse Practitioner)    Addended by: ROSIE DORADO on: 5/14/2021 07:48 AM        Modules accepted: Orders

## 2021-07-04 NOTE — LETTER
Letter by Rosie Dorado FNP at      Author: Rosie Dorado FNP Service: -- Author Type: --    Filed:  Encounter Date: 6/17/2021 Status: (Other)       06/17/21  Re: Remi Maincaranoemí  Birthday: 1960          Dear Colleague,      Thank you for your referral to the Diabetes Education Program.    We have made multiple attempts to contact patient with no response back. We will no longer contact patient to schedule.      If you have any questions or concerns, please contact our office at [phone].        Sincerely,   Clinician and Staff of [facility]

## 2021-11-15 DIAGNOSIS — R80.9 TYPE 2 DIABETES MELLITUS WITH MICROALBUMINURIA, WITHOUT LONG-TERM CURRENT USE OF INSULIN (H): ICD-10-CM

## 2021-11-15 DIAGNOSIS — E11.29 TYPE 2 DIABETES MELLITUS WITH MICROALBUMINURIA, WITHOUT LONG-TERM CURRENT USE OF INSULIN (H): ICD-10-CM

## 2021-11-16 RX ORDER — DULAGLUTIDE 0.75 MG/.5ML
0.75 INJECTION, SOLUTION SUBCUTANEOUS
Qty: 2 ML | Refills: 0 | Status: SHIPPED | OUTPATIENT
Start: 2021-11-16 | End: 2021-12-20

## 2021-11-16 NOTE — TELEPHONE ENCOUNTER
Pt is calling for update on his refill request for dulaglutide (Trulicity) .075 mg    Reminded patient that he needs to schedule follow up appoint.    Accepted offer to schedule - 11/30/21 8:20    Pt requesting bridge or refill - he has one dose remaining of medication.

## 2021-11-16 NOTE — TELEPHONE ENCOUNTER
"Pt has upcoming appt 11/30/2021  Duplicate.    Last Written Prescription Date:  11/16/2021  Last Fill Quantity: 2 mL,  # refills: 0   Last office visit provider:  05/12/2021 with Rosie Dorado NP     Requested Prescriptions   Signed Prescriptions Disp Refills    dulaglutide (TRULICITY) 0.75 MG/0.5ML pen 2 mL 0     Sig: Inject 0.75 mg Subcutaneous every 7 days       GLP-1 Agonists Protocol Failed - 11/16/2021 12:25 PM        Failed - HgbA1C in past 3 or 6 months     If HgbA1C is 8 or greater, it needs to be on file within the past 3 months.  If less than 8, must be on file within the past 6 months.     Recent Labs   Lab Test 05/12/21  1500   A1C 7.4*             Passed - Medication is active on med list        Passed - Patient is age 18 or older        Passed - Normal serum creatinine on file in past 12 months     Recent Labs   Lab Test 05/12/21  1500   CR 0.84       Ok to refill medication if creatinine is low          Passed - Recent (6 mo) or future (30 days) visit within the authorizing provider's specialty     Patient had office visit in the last 6 months or has a visit in the next 30 days with authorizing provider.  See \"Patient Info\" tab in inbasket, or \"Choose Columns\" in Meds & Orders section of the refill encounter.                 Cristiane Noguera 11/16/21 1:21 PM  "

## 2021-12-01 DIAGNOSIS — E11.29 TYPE 2 DIABETES MELLITUS WITH MICROALBUMINURIA, WITHOUT LONG-TERM CURRENT USE OF INSULIN (H): ICD-10-CM

## 2021-12-01 DIAGNOSIS — R80.9 TYPE 2 DIABETES MELLITUS WITH MICROALBUMINURIA, WITHOUT LONG-TERM CURRENT USE OF INSULIN (H): ICD-10-CM

## 2021-12-03 NOTE — TELEPHONE ENCOUNTER
"Routing refill request to provider for review/approval because:  Labs not current:  A1C  Patient needs to be seen because it has been more than 6 months since last office visit.    Last Written Prescription Date:  5/12/21  Last Fill Quantity: 180,  # refills: 1   Last office visit provider:  5/12/21     Requested Prescriptions   Pending Prescriptions Disp Refills     glipiZIDE (GLUCOTROL XL) 10 MG 24 hr tablet [Pharmacy Med Name: glipiZIDE ER 10 MG Oral Tablet Extended Release 24 Hour] 180 tablet 0     Sig: Take 2 tablets by mouth once daily       Sulfonylurea Agents Failed - 12/1/2021  2:51 PM        Failed - Patient has documented A1c within the specified period of time.     If HgbA1C is 8 or greater, it needs to be on file within the past 3 months.  If less than 8, must be on file within the past 6 months.     Recent Labs   Lab Test 05/12/21  1500   A1C 7.4*             Passed - Medication is active on med list        Passed - Patient is age 18 or older        Passed - Patient has a recent creatinine (normal) within the past 12 mos.     Recent Labs   Lab Test 05/12/21  1500   CR 0.84       Ok to refill medication if creatinine is low          Passed - Recent (6 mo) or future (30 days) visit within the authorizing provider's specialty     Patient had office visit in the last 6 months or has a visit in the next 30 days with authorizing provider or within the authorizing provider's specialty.  See \"Patient Info\" tab in inbasket, or \"Choose Columns\" in Meds & Orders section of the refill encounter.                 Vikram Burch RN 12/03/21 12:53 PM  "

## 2021-12-06 RX ORDER — GLIPIZIDE 10 MG/1
TABLET, FILM COATED, EXTENDED RELEASE ORAL
Qty: 180 TABLET | Refills: 0 | Status: SHIPPED | OUTPATIENT
Start: 2021-12-06 | End: 2022-03-07

## 2021-12-09 ENCOUNTER — OFFICE VISIT (OUTPATIENT)
Dept: INTERNAL MEDICINE | Facility: CLINIC | Age: 61
End: 2021-12-09
Payer: COMMERCIAL

## 2021-12-09 VITALS
OXYGEN SATURATION: 97 % | SYSTOLIC BLOOD PRESSURE: 118 MMHG | BODY MASS INDEX: 33.11 KG/M2 | WEIGHT: 211.38 LBS | HEART RATE: 66 BPM | DIASTOLIC BLOOD PRESSURE: 76 MMHG

## 2021-12-09 DIAGNOSIS — F41.1 ANXIETY STATE: ICD-10-CM

## 2021-12-09 DIAGNOSIS — E11.29 TYPE 2 DIABETES MELLITUS WITH MICROALBUMINURIA, WITHOUT LONG-TERM CURRENT USE OF INSULIN (H): ICD-10-CM

## 2021-12-09 DIAGNOSIS — R80.9 TYPE 2 DIABETES MELLITUS WITH MICROALBUMINURIA, WITHOUT LONG-TERM CURRENT USE OF INSULIN (H): ICD-10-CM

## 2021-12-09 DIAGNOSIS — I10 ESSENTIAL HYPERTENSION, BENIGN: ICD-10-CM

## 2021-12-09 DIAGNOSIS — M65.30 TRIGGER FINGER, ACQUIRED: Primary | ICD-10-CM

## 2021-12-09 DIAGNOSIS — E78.2 MIXED HYPERLIPIDEMIA: ICD-10-CM

## 2021-12-09 LAB
ALT SERPL W P-5'-P-CCNC: 18 U/L (ref 0–45)
CHOLEST SERPL-MCNC: 143 MG/DL
CREAT UR-MCNC: 119 MG/DL
ERYTHROCYTE [DISTWIDTH] IN BLOOD BY AUTOMATED COUNT: 13.3 % (ref 10–15)
FASTING STATUS PATIENT QL REPORTED: YES
HBA1C MFR BLD: 6.5 % (ref 0–5.6)
HCT VFR BLD AUTO: 45.7 % (ref 40–53)
HDLC SERPL-MCNC: 48 MG/DL
HGB BLD-MCNC: 15.2 G/DL (ref 13.3–17.7)
LDLC SERPL CALC-MCNC: 62 MG/DL
MCH RBC QN AUTO: 32.1 PG (ref 26.5–33)
MCHC RBC AUTO-ENTMCNC: 33.3 G/DL (ref 31.5–36.5)
MCV RBC AUTO: 96 FL (ref 78–100)
MICROALBUMIN UR-MCNC: 0.78 MG/DL (ref 0–1.99)
MICROALBUMIN/CREAT UR: 6.6 MG/G CR
PLATELET # BLD AUTO: 149 10E3/UL (ref 150–450)
RBC # BLD AUTO: 4.74 10E6/UL (ref 4.4–5.9)
TRIGL SERPL-MCNC: 164 MG/DL
WBC # BLD AUTO: 8.7 10E3/UL (ref 4–11)

## 2021-12-09 PROCEDURE — 83036 HEMOGLOBIN GLYCOSYLATED A1C: CPT | Performed by: NURSE PRACTITIONER

## 2021-12-09 PROCEDURE — 36415 COLL VENOUS BLD VENIPUNCTURE: CPT | Performed by: NURSE PRACTITIONER

## 2021-12-09 PROCEDURE — 82043 UR ALBUMIN QUANTITATIVE: CPT | Performed by: NURSE PRACTITIONER

## 2021-12-09 PROCEDURE — 85027 COMPLETE CBC AUTOMATED: CPT | Performed by: NURSE PRACTITIONER

## 2021-12-09 PROCEDURE — 99214 OFFICE O/P EST MOD 30 MIN: CPT | Performed by: NURSE PRACTITIONER

## 2021-12-09 PROCEDURE — 80061 LIPID PANEL: CPT | Performed by: NURSE PRACTITIONER

## 2021-12-09 PROCEDURE — 84460 ALANINE AMINO (ALT) (SGPT): CPT | Performed by: NURSE PRACTITIONER

## 2021-12-09 RX ORDER — GLUCOSAMINE HCL/CHONDROITIN SU 500-400 MG
CAPSULE ORAL
Qty: 100 STRIP | Refills: 11 | Status: SHIPPED | OUTPATIENT
Start: 2021-12-09 | End: 2023-07-12

## 2021-12-09 RX ORDER — LISINOPRIL 2.5 MG/1
2.5 TABLET ORAL DAILY
Qty: 90 TABLET | Refills: 3 | Status: SHIPPED | OUTPATIENT
Start: 2021-12-09 | End: 2023-01-05

## 2021-12-09 NOTE — PROGRESS NOTES
Internal Medicine Office Visit  [facility]   Patient Name: Remi Mathias  Patient Age: 61 year old  YOB: 1960  MRN: [id number]    Date of Visit: 12/9/2021  Patient presents with:  f/u DM and a1c  pain in rt hand: present for two weeks- will experience some numbness or burning feeling; comes everyday and last the whole day            Assessment / Plan / Medical Decision Making:    Problem List Items Addressed This Visit        Endocrine    Hyperlipidemia     Repeat lipid panel today   Continue statin          Type 2 diabetes mellitus (H)     Stable with A1C of 6.5% today          Relevant Medications    Glucose Blood (BLOOD GLUCOSE TEST STRIPS) STRP    lisinopril (ZESTRIL) 2.5 MG tablet    Other Relevant Orders    Hemoglobin A1c (Completed)    CBC with platelets (Completed)    Lipid panel reflex to direct LDL Fasting (Completed)    ALT (Completed)    Albumin Random Urine Quantitative with Creat Ratio (Completed)       Circulatory    Essential hypertension, benign     Stable          Relevant Medications    lisinopril (ZESTRIL) 2.5 MG tablet       Other    Anxiety, insomnia      PRN lorazepam. Last dose was approximately 2 months ago            Other Visit Diagnoses     Trigger finger, acquired    -  Primary    - brace, NSAID trial x 2-3 weeks  - Can schedule cortisone injection if no improvement            I have changed Mahendra Mathias's BLOOD GLUCOSE TEST STRIPS and lisinopril. I am also having him maintain his blood-glucose meter Misc, acetaminophen, escitalopram, LORazepam, nicotine, nicotine, atorvastatin, Trulicity, and glipiZIDE.          Orders Placed This Encounter   Procedures     REVIEW OF HEALTH MAINTENANCE PROTOCOL ORDERS     Hemoglobin A1c     CBC with platelets     Lipid panel reflex to direct LDL Fasting     ALT     Albumin Random Urine Quantitative with Creat Ratio   Followup: Return in about 6 months (around 6/9/2022) for Follow up. earlier if  needed.    Rosie Dorado, NP, CNP        HPI:  Remi Mathias is a 61 year old year old who presents to the office today for follow up.     Triggering of the right hand ring finger x 2 weeks.    Glucose readings have been mostly in range but he doesn't check too often. Recent readings were 107, 117.     Tobacco use was reviewed, he is smoking 1/2 pack per day.     Answers for HPI/ROS submitted by the patient on 12/9/2021  Are you regularly taking any medication or supplement to lower your cholesterol?: Yes  Are you having muscle aches or other side effects that you think could be caused by your cholesterol lowering medication?: No  Frequency of checking blood sugars:: a few times a month  What time of day are you checking your blood sugars : before and after meals  Have you had any blood sugars above 200?: No  Have you had any blood sugars below 70?: No  Hypoglycemia symptoms:: none  Diabetic concerns:: none  Paraesthesia present:: excessive thirst, blurry vision  Have you had a diabetic eye exam within the last year?: No  How many servings of fruits and vegetables do you eat daily?: 0-1  On average, how many sweetened beverages do you drink each day (Examples: soda, juice, sweet tea, etc.  Do NOT count diet or artificially sweetened beverages)?: 3  How many minutes a day do you exercise enough to make your heart beat faster?: 10 to 19  How many days a week do you exercise enough to make your heart beat faster?: 4  How many days per week do you miss taking your medication?: 0        Health Maintenance Review  Health Maintenance   Topic Date Due     PREVENTIVE CARE VISIT  Never done     ADVANCE CARE PLANNING  Never done     Pneumococcal Vaccine: Pediatrics (0 to 5 Years) and At-Risk Patients (6 to 64 Years) (1 of 2 - PPSV23) Never done     ZOSTER IMMUNIZATION (1 of 2) Never done     LUNG CANCER SCREENING  Never done     EYE EXAM  04/01/2020     COVID-19 Vaccine (3 - Booster for Moderna series) 04/08/2022     BMP   05/12/2022     DIABETIC FOOT EXAM  05/12/2022     A1C  06/09/2022     LIPID  12/09/2022     MICROALBUMIN  12/09/2022     ANNUAL REVIEW OF HM ORDERS  12/09/2022     COLORECTAL CANCER SCREENING  10/25/2026     DTAP/TDAP/TD IMMUNIZATION (2 - Td or Tdap) 10/05/2030     PHQ-2  Completed     INFLUENZA VACCINE  Completed     IPV IMMUNIZATION  Aged Out     MENINGITIS IMMUNIZATION  Aged Out     HEPATITIS C SCREENING  Discontinued     HIV SCREENING  Discontinued       Current Scheduled Meds:  Outpatient Encounter Medications as of 12/9/2021   Medication Sig Dispense Refill     acetaminophen (TYLENOL) 325 MG tablet [ACETAMINOPHEN (TYLENOL) 325 MG TABLET] Take 2 tablets (650 mg total) by mouth every 4 (four) hours as needed.  0     atorvastatin (LIPITOR) 20 MG tablet Take 1 tablet (20 mg) by mouth daily Due for appointment in November 90 tablet 0     dulaglutide (TRULICITY) 0.75 MG/0.5ML pen Inject 0.75 mg Subcutaneous every 7 days 2 mL 0     escitalopram oxalate (LEXAPRO) 20 MG tablet [ESCITALOPRAM OXALATE (LEXAPRO) 20 MG TABLET] Take 1 tablet (20 mg total) by mouth daily. 90 tablet 1     glipiZIDE (GLUCOTROL XL) 10 MG 24 hr tablet Take 2 tablets by mouth once daily 180 tablet 0     Glucose Blood (BLOOD GLUCOSE TEST STRIPS) STRP Test blood sugar once daily 100 strip 11     lisinopril (ZESTRIL) 2.5 MG tablet Take 1 tablet (2.5 mg) by mouth daily 90 tablet 3     LORazepam (ATIVAN) 0.5 MG tablet [LORAZEPAM (ATIVAN) 0.5 MG TABLET] TAKE 1 TABLET BY MOUTH ONCE DAILY AS NEEDED FOR ANXIETY 15 tablet 0     nicotine (NICODERM CQ) 21 mg/24 hr [NICOTINE (NICODERM CQ) 21 MG/24 HR] Place 1 patch on the skin daily. 30 patch 0     nicotine (NICODERM CQ) 7 mg/24 hr [NICOTINE (NICODERM CQ) 7 MG/24 HR] Place 1 patch on the skin daily. 30 patch 0     blood-glucose meter Misc [BLOOD-GLUCOSE METER MISC] Check blood sugar twice daily. Dispense meter per insurance/patient preference (Patient not taking: Reported on 12/9/2021) 1 each 0      [DISCONTINUED] blood glucose test strips [BLOOD GLUCOSE TEST STRIPS] Check 1-2 times per day. Dx:e11.9 (Patient not taking: Reported on 12/9/2021) 100 strip 11     [DISCONTINUED] blood glucose test strips [BLOOD GLUCOSE TEST STRIPS] Check blood sugar twice daily. Dispense brand per patient's insurance at pharmacy discretion. (Patient not taking: Reported on 12/9/2021) 100 strip 11     [DISCONTINUED] escitalopram oxalate (LEXAPRO) 20 MG tablet [ESCITALOPRAM OXALATE (LEXAPRO) 20 MG TABLET] Take 1 tablet by mouth once daily (Patient not taking: Reported on 12/9/2021) 90 tablet 3     [DISCONTINUED] fluticasone propionate (FLONASE) 50 mcg/actuation nasal spray [FLUTICASONE PROPIONATE (FLONASE) 50 MCG/ACTUATION NASAL SPRAY] 1 spray into each nostril daily as needed. (Patient not taking: Reported on 12/9/2021)       [DISCONTINUED] lisinopril (ZESTRIL) 2.5 MG tablet Take 1 tablet (2.5 mg) by mouth daily Due for Nov. Appointment 90 tablet 0     [DISCONTINUED] semaglutide (RYBELSUS) 3 mg tablet [SEMAGLUTIDE (RYBELSUS) 3 MG TABLET] Take 3 mg by mouth daily. Take x 30 days then increase to 7 mg daily 30 tablet 0     [DISCONTINUED] semaglutide (RYBELSUS) 7 mg tablet [SEMAGLUTIDE (RYBELSUS) 7 MG TABLET] Take 7 mg by mouth daily. 30 tablet 3     No facility-administered encounter medications on file as of 12/9/2021.         Objective / Physical Examination:  Vitals:    12/09/21 0702   BP: 118/76   BP Location: Right arm   Patient Position: Sitting   Cuff Size: Adult Regular   Pulse: 66   SpO2: 97%   Weight: 95.9 kg (211 lb 6 oz)     Wt Readings from Last 3 Encounters:   12/09/21 95.9 kg (211 lb 6 oz)   05/12/21 96.2 kg (212 lb)   10/05/20 95.3 kg (210 lb)     Body mass index is 33.11 kg/m .     Constitutional: In no apparent distress  Respiratory: Clear to auscultation bilaterally. Normal inspiratory and expiratory effort  Cardiovascular: Regular rate and rhythm. No murmurs, rubs, or gallops. No edema.   MSK: Triggering right ring  finger. Tenderness over A1 pulley

## 2021-12-09 NOTE — LETTER
December 13, 2021      Remi Mathias  [address] [place] [id number]        Dear ,    We are writing to inform you of your test results.    Your A1c looks great, I am happy to see this has improved compared to the last readings and medication changes are needed.    Cholesterol levels are overall stable.    There is no excess protein in the urine.    Liver enzyme test is normal.    Resulted Orders   Hemoglobin A1c   Result Value Ref Range    Hemoglobin A1C 6.5 (H) 0.0 - 5.6 %      Comment:      Normal <5.7%   Prediabetes 5.7-6.4%    Diabetes 6.5% or higher     Note: Adopted from ADA consensus guidelines.   CBC with platelets   Result Value Ref Range    WBC Count 8.7 4.0 - 11.0 10e3/uL    RBC Count 4.74 4.40 - 5.90 10e6/uL    Hemoglobin 15.2 13.3 - 17.7 g/dL    Hematocrit 45.7 40.0 - 53.0 %    MCV 96 78 - 100 fL    MCH 32.1 26.5 - 33.0 pg    MCHC 33.3 31.5 - 36.5 g/dL    RDW 13.3 10.0 - 15.0 %    Platelet Count 149 (L) 150 - 450 10e3/uL   Lipid panel reflex to direct LDL Fasting   Result Value Ref Range    Cholesterol 143 <=199 mg/dL    Triglycerides 164 (H) <=149 mg/dL    Direct Measure HDL 48 >=40 mg/dL      Comment:      HDL Cholesterol Reference Range:     0-2 years:   No reference ranges established for patients under 2 years old  at [facility] for lipid analytes.    2-8 years:  Greater than 45 mg/dL     18 years and older:   Female: Greater than or equal to 50 mg/dL   Male:   Greater than or equal to 40 mg/dL    LDL Cholesterol Calculated 62 <=129 mg/dL    Patient Fasting > 8hrs? Yes    ALT   Result Value Ref Range    ALT 18 0 - 45 U/L   Albumin Random Urine Quantitative with Creat Ratio   Result Value Ref Range    Microalbumin Urine mg/dL 0.78 0.00 - 1.99 mg/dL    Creatinine Urine mg/dL 119 mg/dL    Microalbumin Urine mg/g Cr 6.6 <=19.9 mg/g Cr    Narrative    Microalbumin, Random Urine   <2.0 mg/dL . . . . . . . . Normal   3.0-30.0 mg/dL . . . . . . Microalbuminuria   >30.0  mg/dL . . . . . .  . Clinical Proteinuria     Microalbumin/Creatinine Ratio, Random Urine   <20 mg/g . . . . .. . . . Normal    mg/g . . . . . . . Microalbuminuria   >300 mg/g . . . . . . . . Clinical Proteinuria       If you have any questions or concerns, please call the clinic at the number listed above.       Sincerely,      Rosie Dorado NP

## 2021-12-09 NOTE — PATIENT INSTRUCTIONS
Patient Education     Patient Education     Treating Trigger Finger    Trigger finger occurs when the tissue inside your finger or thumb becomes inflamed. Mild cases can be treated without surgery. If the problem is severe, surgery may be needed. Your healthcare provider will talk with you about your options.  Nonsurgical treatment  For mild symptoms, your healthcare provider may have you rest the finger or thumb. You may also be told to take anti-inflammatory medicines. These include ibuprofen or aspirin. You may be given an injection of medicine in the base of the finger or thumb. This typically is a steroid, such as cortisone.  Surgery  If nonsurgical treatments don t ease your symptoms, you may need surgery. A tendon is a cordlike fiber that attaches muscle to bone and allows joints to bend. The tendon is surrounded by a protective cover called a sheath. During surgery, the sheath in your finger or thumb is opened to enlarge the space and release the swollen tendon. This allows the finger or thumb to bend and straighten normally. Surgery takes about 20 minutes. It can often be done using a local anesthetic. You may also be sedated. You will likely be able to go home the same day. Your hand will be wrapped in a soft bandage. You may need to wear a plaster splint for a short time to keep the finger or thumb still as it heals. The stitches will be removed in about 2 weeks. Your healthcare provider will talk with you about the risks and benefits of surgery.  Robert last reviewed this educational content on 1/1/2018 2000-2021 [employer]. All rights reserved. This information is not intended as a substitute for professional medical care. Always follow your healthcare professional's instructions.               What is Trigger Finger?  Trigger finger is an inflammation of tissue inside your finger or thumb. It is also called tenosynovitis (ten-oh-sin-oh-VY-tis). Tendons (cordlike fibers that attach  muscle to bone and allow you to bend the joints) become swollen. So does the synovium (a slick membrane that allows the tendons to move easily). This makes it hard to straighten the finger or thumb.    Causes  Repeated use of a tool with strong gripping, such as a drill or wrench, can irritate and inflame the tendons and the synovium. It is also more common in certain medical conditions, such as rheumatoid arthritis, gout, and diabetes. But often the cause of trigger finger is unknown.  Inside your finger  Tendons connect muscles in your forearm to the bones in your fingers. The tendons in each finger are surrounded by a protective tendon sheath. This sheath is lined with synovium, which produces a fluid that allows the tendons to slide easily when you bend and straighten the finger. If a tendon is irritated, it becomes inflamed.  When a tendon is inflamed  When a tendon is inflamed, it causes the lining of the tendon sheath to swell and thicken. Or the tendon itself may thicken. Then the sheath pinches the tendon. The tendon can then no longer slide easily inside the sheath. When you straighten your finger, the tendon sticks or  locks  as it tries to squeeze back through the sheath.    Symptoms  The first sign of trigger finger may be pain where the finger or thumb joins the palm. You may also notice some swelling. As the tendon becomes more inflamed, the finger may start to catch when you try to straighten or bend it. When the locked tendon releases, the finger jumps, as if you were releasing the trigger of a gun. This further irritates the tendon. It may set up a cycle of catching and swelling.   [employer] last reviewed this educational content on 1/1/2018 2000-2021 [employer]. All rights reserved. This information is not intended as a substitute for professional medical care. Always follow your healthcare professional's instructions.    Call insurance to find out if low dose [place] lung cancer  screening is covered. Also see if the shingles vaccine is covered   The risks include:   radiation exposure    false positives     over-diagnosis    The benefit of early detection of lung cancer is contingent upon adherence to annual screening or more frequent follow up if indicated.

## 2021-12-18 DIAGNOSIS — R80.9 TYPE 2 DIABETES MELLITUS WITH MICROALBUMINURIA, WITHOUT LONG-TERM CURRENT USE OF INSULIN (H): ICD-10-CM

## 2021-12-18 DIAGNOSIS — E11.29 TYPE 2 DIABETES MELLITUS WITH MICROALBUMINURIA, WITHOUT LONG-TERM CURRENT USE OF INSULIN (H): ICD-10-CM

## 2021-12-20 RX ORDER — DULAGLUTIDE 0.75 MG/.5ML
INJECTION, SOLUTION SUBCUTANEOUS
Qty: 6 ML | Refills: 1 | Status: SHIPPED | OUTPATIENT
Start: 2021-12-20 | End: 2022-06-08

## 2021-12-20 NOTE — TELEPHONE ENCOUNTER
"Last Written Prescription Date:  11/16/21  Last Fill Quantity: 2 mL,  # refills: 0   Last office visit provider:  12/9/21     Requested Prescriptions   Pending Prescriptions Disp Refills     TRULICITY 0.75 MG/0.5ML pen [Pharmacy Med Name: Trulicity 0.75 MG/0.5ML Subcutaneous Solution Pen-injector] 4 mL 0     Sig: INJECT0.75 MG SUBCUTANEOUSLY ONCE A WEEK       GLP-1 Agonists Protocol Passed - 12/18/2021  6:47 AM        Passed - HgbA1C in past 3 or 6 months     If HgbA1C is 8 or greater, it needs to be on file within the past 3 months.  If less than 8, must be on file within the past 6 months.     Recent Labs   Lab Test 12/09/21  0754   A1C 6.5*             Passed - Medication is active on med list        Passed - Patient is age 18 or older        Passed - Normal serum creatinine on file in past 12 months     Recent Labs   Lab Test 05/12/21  1500   CR 0.84       Ok to refill medication if creatinine is low          Passed - Recent (6 mo) or future (30 days) visit within the authorizing provider's specialty     Patient had office visit in the last 6 months or has a visit in the next 30 days with authorizing provider.  See \"Patient Info\" tab in inbasket, or \"Choose Columns\" in Meds & Orders section of the refill encounter.                 Dixie Johnson RN 12/20/21 3:38 PM  "

## 2022-03-05 DIAGNOSIS — R80.9 TYPE 2 DIABETES MELLITUS WITH MICROALBUMINURIA, WITHOUT LONG-TERM CURRENT USE OF INSULIN (H): ICD-10-CM

## 2022-03-05 DIAGNOSIS — E11.29 TYPE 2 DIABETES MELLITUS WITH MICROALBUMINURIA, WITHOUT LONG-TERM CURRENT USE OF INSULIN (H): ICD-10-CM

## 2022-03-07 RX ORDER — GLIPIZIDE 10 MG/1
TABLET, FILM COATED, EXTENDED RELEASE ORAL
Qty: 180 TABLET | Refills: 1 | Status: SHIPPED | OUTPATIENT
Start: 2022-03-07 | End: 2022-06-07

## 2022-03-07 NOTE — TELEPHONE ENCOUNTER
"Last Written Prescription Date:  12/6/21  Last Fill Quantity: 180,  # refills: 0   Last office visit provider:  12/9/21     Requested Prescriptions   Pending Prescriptions Disp Refills     glipiZIDE (GLUCOTROL XL) 10 MG 24 hr tablet [Pharmacy Med Name: glipiZIDE ER 10 MG Oral Tablet Extended Release 24 Hour] 180 tablet 0     Sig: Take 2 tablets by mouth once daily       Sulfonylurea Agents Passed - 3/7/2022 10:34 AM        Passed - Patient has documented A1c within the specified period of time.     If HgbA1C is 8 or greater, it needs to be on file within the past 3 months.  If less than 8, must be on file within the past 6 months.     Recent Labs   Lab Test 12/09/21  0754   A1C 6.5*             Passed - Medication is active on med list        Passed - Patient is age 18 or older        Passed - Patient has a recent creatinine (normal) within the past 12 mos.     Recent Labs   Lab Test 05/12/21  1500   CR 0.84       Ok to refill medication if creatinine is low          Passed - Recent (6 mo) or future (30 days) visit within the authorizing provider's specialty     Patient had office visit in the last 6 months or has a visit in the next 30 days with authorizing provider or within the authorizing provider's specialty.  See \"Patient Info\" tab in inbasket, or \"Choose Columns\" in Meds & Orders section of the refill encounter.                 Vikram Burch RN 03/07/22 10:34 AM  "

## 2022-06-06 DIAGNOSIS — E11.29 TYPE 2 DIABETES MELLITUS WITH MICROALBUMINURIA, WITHOUT LONG-TERM CURRENT USE OF INSULIN (H): ICD-10-CM

## 2022-06-06 DIAGNOSIS — R80.9 TYPE 2 DIABETES MELLITUS WITH MICROALBUMINURIA, WITHOUT LONG-TERM CURRENT USE OF INSULIN (H): ICD-10-CM

## 2022-06-07 NOTE — TELEPHONE ENCOUNTER
"Routing refill request to provider for review/approval because:  Labs out of range:  A1C  Labs not current:  Creatinine, A1C  Early refill request    Last Written Prescription Date:  3/7/2022  Last Fill Quantity: 180,  # refills: 1   Last office visit provider:  12/9/2021     Requested Prescriptions   Pending Prescriptions Disp Refills     glipiZIDE (GLUCOTROL XL) 10 MG 24 hr tablet 180 tablet 1       Sulfonylurea Agents Failed - 6/7/2022 11:14 AM        Failed - Patient has a recent creatinine (normal) within the past 12 mos.     Recent Labs   Lab Test 05/12/21  1500   CR 0.84       Ok to refill medication if creatinine is low          Failed - Recent (6 mo) or future (30 days) visit within the authorizing provider's specialty     Patient had office visit in the last 6 months or has a visit in the next 30 days with authorizing provider or within the authorizing provider's specialty.  See \"Patient Info\" tab in inbasket, or \"Choose Columns\" in Meds & Orders section of the refill encounter.            Passed - Patient has documented A1c within the specified period of time.     If HgbA1C is 8 or greater, it needs to be on file within the past 3 months.  If less than 8, must be on file within the past 6 months.     Recent Labs   Lab Test 12/09/21  0754   A1C 6.5*             Passed - Medication is active on med list        Passed - Patient is age 18 or older             Jocelyn Brown RN 06/07/22 11:16 AM  "

## 2022-06-08 RX ORDER — GLIPIZIDE 10 MG/1
20 TABLET, FILM COATED, EXTENDED RELEASE ORAL DAILY
Qty: 60 TABLET | Refills: 0 | Status: SHIPPED | OUTPATIENT
Start: 2022-06-08 | End: 2022-07-20

## 2022-07-20 ENCOUNTER — OFFICE VISIT (OUTPATIENT)
Dept: FAMILY MEDICINE | Facility: CLINIC | Age: 62
End: 2022-07-20
Payer: COMMERCIAL

## 2022-07-20 VITALS
HEART RATE: 66 BPM | DIASTOLIC BLOOD PRESSURE: 75 MMHG | HEIGHT: 67 IN | SYSTOLIC BLOOD PRESSURE: 105 MMHG | RESPIRATION RATE: 18 BRPM | BODY MASS INDEX: 33.43 KG/M2 | OXYGEN SATURATION: 93 % | TEMPERATURE: 98.2 F | WEIGHT: 213 LBS

## 2022-07-20 DIAGNOSIS — E78.2 MIXED HYPERLIPIDEMIA: ICD-10-CM

## 2022-07-20 DIAGNOSIS — E11.29 TYPE 2 DIABETES MELLITUS WITH MICROALBUMINURIA, WITHOUT LONG-TERM CURRENT USE OF INSULIN (H): Primary | ICD-10-CM

## 2022-07-20 DIAGNOSIS — R80.9 TYPE 2 DIABETES MELLITUS WITH MICROALBUMINURIA, WITHOUT LONG-TERM CURRENT USE OF INSULIN (H): Primary | ICD-10-CM

## 2022-07-20 DIAGNOSIS — I10 ESSENTIAL HYPERTENSION, BENIGN: ICD-10-CM

## 2022-07-20 LAB
ALBUMIN SERPL BCG-MCNC: 4.2 G/DL (ref 3.5–5.2)
ALP SERPL-CCNC: 103 U/L (ref 40–129)
ALT SERPL W P-5'-P-CCNC: 19 U/L (ref 10–50)
ANION GAP SERPL CALCULATED.3IONS-SCNC: 10 MMOL/L (ref 7–15)
AST SERPL W P-5'-P-CCNC: 19 U/L (ref 10–50)
BILIRUB SERPL-MCNC: 0.3 MG/DL
BUN SERPL-MCNC: 11.1 MG/DL (ref 8–23)
CALCIUM SERPL-MCNC: 9.2 MG/DL (ref 8.8–10.2)
CHLORIDE SERPL-SCNC: 105 MMOL/L (ref 98–107)
CHOLEST SERPL-MCNC: 171 MG/DL
CREAT SERPL-MCNC: 0.81 MG/DL (ref 0.67–1.17)
DEPRECATED HCO3 PLAS-SCNC: 24 MMOL/L (ref 22–29)
GFR SERPL CREATININE-BSD FRML MDRD: >90 ML/MIN/1.73M2
GLUCOSE SERPL-MCNC: 60 MG/DL (ref 70–99)
HBA1C MFR BLD: 7.3 % (ref 0–5.6)
HDLC SERPL-MCNC: 44 MG/DL
LDLC SERPL CALC-MCNC: 89 MG/DL
NONHDLC SERPL-MCNC: 127 MG/DL
POTASSIUM SERPL-SCNC: 4.4 MMOL/L (ref 3.4–5.3)
PROT SERPL-MCNC: 7.6 G/DL (ref 6.4–8.3)
SODIUM SERPL-SCNC: 139 MMOL/L (ref 136–145)
TRIGL SERPL-MCNC: 188 MG/DL

## 2022-07-20 PROCEDURE — 80061 LIPID PANEL: CPT | Performed by: FAMILY MEDICINE

## 2022-07-20 PROCEDURE — 80053 COMPREHEN METABOLIC PANEL: CPT | Performed by: FAMILY MEDICINE

## 2022-07-20 PROCEDURE — 99214 OFFICE O/P EST MOD 30 MIN: CPT | Mod: 25 | Performed by: FAMILY MEDICINE

## 2022-07-20 PROCEDURE — 83036 HEMOGLOBIN GLYCOSYLATED A1C: CPT | Performed by: FAMILY MEDICINE

## 2022-07-20 PROCEDURE — 90750 HZV VACC RECOMBINANT IM: CPT | Performed by: FAMILY MEDICINE

## 2022-07-20 PROCEDURE — 90472 IMMUNIZATION ADMIN EACH ADD: CPT | Performed by: FAMILY MEDICINE

## 2022-07-20 PROCEDURE — 36415 COLL VENOUS BLD VENIPUNCTURE: CPT | Performed by: FAMILY MEDICINE

## 2022-07-20 PROCEDURE — 90677 PCV20 VACCINE IM: CPT | Performed by: FAMILY MEDICINE

## 2022-07-20 PROCEDURE — 90471 IMMUNIZATION ADMIN: CPT | Performed by: FAMILY MEDICINE

## 2022-07-20 RX ORDER — DULAGLUTIDE 0.75 MG/.5ML
0.75 INJECTION, SOLUTION SUBCUTANEOUS
Qty: 6 ML | Refills: 1 | Status: SHIPPED | OUTPATIENT
Start: 2022-07-20 | End: 2023-01-18 | Stop reason: DRUGHIGH

## 2022-07-20 RX ORDER — GLIPIZIDE 10 MG/1
20 TABLET, FILM COATED, EXTENDED RELEASE ORAL DAILY
Qty: 180 TABLET | Refills: 1 | Status: SHIPPED | OUTPATIENT
Start: 2022-07-20 | End: 2023-04-05

## 2022-07-20 ASSESSMENT — PAIN SCALES - GENERAL: PAINLEVEL: NO PAIN (0)

## 2022-07-20 NOTE — LETTER
July 22, 2022      Mahendra Mathias  [address] [place] [id number]        Dear ,    We are writing to inform you of your test results.    Satisfactory      Resulted Orders   HEMOGLOBIN A1C   Result Value Ref Range    Hemoglobin A1C 7.3 (H) 0.0 - 5.6 %      Comment:      Normal <5.7%   Prediabetes 5.7-6.4%    Diabetes 6.5% or higher     Note: Adopted from ADA consensus guidelines.   Lipid panel reflex to direct LDL Fasting   Result Value Ref Range    Cholesterol 171 <200 mg/dL    Triglycerides 188 (H) <150 mg/dL    Direct Measure HDL 44 >=40 mg/dL    LDL Cholesterol Calculated 89 <=100 mg/dL    Non HDL Cholesterol 127 <130 mg/dL    Narrative    Cholesterol  Desirable:  <200 mg/dL    Triglycerides  Normal:  Less than 150 mg/dL  Borderline High:  150-199 mg/dL  High:  200-499 mg/dL  Very High:  Greater than or equal to 500 mg/dL    Direct Measure HDL  Female:  Greater than or equal to 50 mg/dL   Male:  Greater than or equal to 40 mg/dL    LDL Cholesterol  Desirable:  <100mg/dL  Above Desirable:  100-129 mg/dL   Borderline High:  130-159 mg/dL   High:  160-189 mg/dL   Very High:  >= 190 mg/dL    Non HDL Cholesterol  Desirable:  130 mg/dL  Above Desirable:  130-159 mg/dL  Borderline High:  160-189 mg/dL  High:  190-219 mg/dL  Very High:  Greater than or equal to 220 mg/dL   Comprehensive metabolic panel (BMP + Alb, Alk Phos, ALT, AST, Total. Bili, TP)   Result Value Ref Range    Sodium 139 136 - 145 mmol/L    Potassium 4.4 3.4 - 5.3 mmol/L    Creatinine 0.81 0.67 - 1.17 mg/dL    Urea Nitrogen 11.1 8.0 - 23.0 mg/dL    Chloride 105 98 - 107 mmol/L    Carbon Dioxide (CO2) 24 22 - 29 mmol/L    Anion Gap 10 7 - 15 mmol/L    Glucose 60 (L) 70 - 99 mg/dL    Calcium 9.2 8.8 - 10.2 mg/dL    Protein Total 7.6 6.4 - 8.3 g/dL    Albumin 4.2 3.5 - 5.2 g/dL    Bilirubin Total 0.3 <=1.2 mg/dL    Alkaline Phosphatase 103 40 - 129 U/L    AST 19 10 - 50 U/L    ALT 19 10 - 50 U/L    GFR Estimate >90 >60 mL/min/1.73m2       Comment:      Effective December 21, 2021 eGFRcr in adults is calculated using the 2021 CKD-EPI creatinine equation which includes age and gender ( et al., NEJM, DOI: 10.1056/YYJMmn1922405)       If you have any questions or concerns, please call the clinic at the number listed above.       Sincerely,      Ramesh Amaro MD

## 2022-07-20 NOTE — LETTER
July 20, 2022      Mahendra Mathias  [address] [place] [id number]        Dear ,    We are writing to inform you of your test results.    Elevated A1c in comparison   Better and tighter control of diabetes is advised   Keep blood glucose < 130   Recheck level in 6 months       Resulted Orders   HEMOGLOBIN A1C   Result Value Ref Range    Hemoglobin A1C 7.3 (H) 0.0 - 5.6 %      Comment:      Normal <5.7%   Prediabetes 5.7-6.4%    Diabetes 6.5% or higher     Note: Adopted from ADA consensus guidelines.       If you have any questions or concerns, please call the clinic at the number listed above.       Sincerely,      Ramesh Amaro MD

## 2022-07-20 NOTE — PROGRESS NOTES
Assessment & Plan     (E11.29,  R80.9) Type 2 diabetes mellitus with microalbuminuria, without long-term current use of insulin (H)  (primary encounter diagnosis)  Comment:   Plan: OPTOMETRY REFERRAL, HEMOGLOBIN A1C,         Comprehensive metabolic panel (BMP + Alb, Alk         Phos, ALT, AST, Total. Bili, TP)        Elevated A1c in comparison  Better and tighter control of diabetes is advised  Keep blood glucose < 130   Recheck level in 6 months     (I10) Essential hypertension, benign  Comment: normotensive   Plan: Continue current management     (E78.2) Mixed hyperlipidemia  Comment: on Atorvastatin 20 mg   Plan: Lipid panel reflex to direct LDL Fasting                       Nicotine/Tobacco Cessation:  He reports that he has been smoking. He has a 22.50 pack-year smoking history. He has never used smokeless tobacco.  Nicotine/Tobacco Cessation Plan:           No follow-ups on file.    Ramesh Amaro MD  [facility]    Kaleb Mccray is a 62 year old with a history of type 2 diabetes and hyperlipidemia and hypertension here for healthcare establishment.  He notes that the current therapy is going well having no additional concerns      History of Present Illness       Diabetes:   He presents for follow up of diabetes.  He is checking home blood glucose a few times a month. He checks blood glucose before meals and at bedtime.  Blood glucose is never over 200 and never under 70. When his blood glucose is low, the patient is asymptomatic for confusion, blurred vision, lethargy and reports not feeling dizzy, shaky, or weak.  He has no concerns regarding his diabetes at this time.  He is having excessive thirst and blurry vision. The patient has not had a diabetic eye exam in the last 12 months.         He eats 0-1 servings of fruits and vegetables daily.He consumes 3 sweetened beverage(s) daily.He exercises with enough effort to increase his heart rate 9 or less minutes per day.  He  "exercises with enough effort to increase his heart rate 3 or less days per week.   He is taking medications regularly.         Review of Systems         Objective    /75 (BP Location: Right arm, Patient Position: Sitting, Cuff Size: Adult Regular)   Pulse 66   Temp 98.2  F (36.8  C) (Oral)   Resp 18   Ht 1.702 m (5' 7\")   Wt 96.6 kg (213 lb)   SpO2 93%   BMI 33.36 kg/m    Body mass index is 33.36 kg/m .     Physical Exam   GENERAL: healthy, alert and no distress  NECK: no adenopathy, no asymmetry, masses, or scars and thyroid normal to palpation  RESP: lungs clear to auscultation - no rales, rhonchi or wheezes  CV: regular rate and rhythm, normal S1 S2, no S3 or S4, no murmur, click or rub, no peripheral edema and peripheral pulses strong  ABDOMEN: soft, nontender, no hepatosplenomegaly, no masses and bowel sounds normal  MS: no gross musculoskeletal defects noted, no edema                    .  ..  "

## 2022-08-31 ENCOUNTER — TRANSFERRED RECORDS (OUTPATIENT)
Dept: HEALTH INFORMATION MANAGEMENT | Facility: CLINIC | Age: 62
End: 2022-08-31

## 2022-08-31 LAB — RETINOPATHY: NEGATIVE

## 2022-12-18 ENCOUNTER — NURSE TRIAGE (OUTPATIENT)
Dept: NURSING | Facility: CLINIC | Age: 62
End: 2022-12-18

## 2022-12-18 NOTE — TELEPHONE ENCOUNTER
Patient calling to find out when his first shingles vaccination was, 9/14/22.  Wondering when he can get the second vaccination .  Patient can schedule any time now to get second vaccination.  Transferred patient to [facility] to make an appointment.   Brissa Kang RN, MA  [facility] Triage Nurse Advisor      Reason for Disposition    Health Information question, no triage required and triager able to answer question    Protocols used: INFORMATION ONLY CALL - NO TRIAGE-A-

## 2023-01-04 DIAGNOSIS — E11.29 TYPE 2 DIABETES MELLITUS WITH MICROALBUMINURIA, WITHOUT LONG-TERM CURRENT USE OF INSULIN (H): ICD-10-CM

## 2023-01-04 DIAGNOSIS — R80.9 TYPE 2 DIABETES MELLITUS WITH MICROALBUMINURIA, WITHOUT LONG-TERM CURRENT USE OF INSULIN (H): ICD-10-CM

## 2023-01-04 DIAGNOSIS — E78.5 HYPERLIPIDEMIA, UNSPECIFIED HYPERLIPIDEMIA TYPE: ICD-10-CM

## 2023-01-04 DIAGNOSIS — F41.1 ANXIETY STATE: ICD-10-CM

## 2023-01-05 RX ORDER — ATORVASTATIN CALCIUM 20 MG/1
TABLET, FILM COATED ORAL
Qty: 90 TABLET | Refills: 1 | Status: SHIPPED | OUTPATIENT
Start: 2023-01-05 | End: 2023-07-03

## 2023-01-05 RX ORDER — ESCITALOPRAM OXALATE 20 MG/1
20 TABLET ORAL DAILY
Qty: 30 TABLET | Refills: 0 | Status: SHIPPED | OUTPATIENT
Start: 2023-01-05 | End: 2023-02-06

## 2023-01-05 RX ORDER — LORAZEPAM 0.5 MG/1
TABLET ORAL
Qty: 15 TABLET | Refills: 0 | OUTPATIENT
Start: 2023-01-05

## 2023-01-05 RX ORDER — LISINOPRIL 2.5 MG/1
2.5 TABLET ORAL DAILY
Qty: 30 TABLET | Refills: 0 | Status: SHIPPED | OUTPATIENT
Start: 2023-01-05 | End: 2023-02-06

## 2023-01-06 NOTE — TELEPHONE ENCOUNTER
FYI - Status Update    Who is Calling: patient    Update: Picked up prescriptions today at [facility] and they were not for 90 day.    Patient does not understand why the RX is from Rosie Dorado.  He states he received a letter that she was leaving [facility].    Patient completed Establish Care with Dr. Amaro on 7/20/22.  He would like to stay with Rosie Dorado, repeats that he received letter that she was leaving.    Scheduled appt with PCP for 1/18/22 needs diabetic check, med refills, and shingles shot.  He will look for letter to bring into clinic.

## 2023-01-09 NOTE — TELEPHONE ENCOUNTER
I'm not sure why he received a letter that I was leaving. I will see him on 1/18 and can  update the prescriptions to 90 day supply

## 2023-01-10 NOTE — TELEPHONE ENCOUNTER
Left message that provider is not leaving [facility], patient can continue to see her.  To discuss further when he comes in on 1/18/23.

## 2023-01-18 ENCOUNTER — LAB (OUTPATIENT)
Dept: INTERNAL MEDICINE | Facility: CLINIC | Age: 63
End: 2023-01-18

## 2023-01-18 ENCOUNTER — OFFICE VISIT (OUTPATIENT)
Dept: INTERNAL MEDICINE | Facility: CLINIC | Age: 63
End: 2023-01-18
Payer: COMMERCIAL

## 2023-01-18 VITALS
WEIGHT: 204.2 LBS | SYSTOLIC BLOOD PRESSURE: 120 MMHG | DIASTOLIC BLOOD PRESSURE: 70 MMHG | HEART RATE: 71 BPM | BODY MASS INDEX: 31.98 KG/M2 | OXYGEN SATURATION: 95 %

## 2023-01-18 DIAGNOSIS — R80.9 TYPE 2 DIABETES MELLITUS WITH MICROALBUMINURIA, WITHOUT LONG-TERM CURRENT USE OF INSULIN (H): Primary | ICD-10-CM

## 2023-01-18 DIAGNOSIS — I10 ESSENTIAL HYPERTENSION, BENIGN: ICD-10-CM

## 2023-01-18 DIAGNOSIS — Z12.11 SCREEN FOR COLON CANCER: ICD-10-CM

## 2023-01-18 DIAGNOSIS — F41.1 ANXIETY STATE: ICD-10-CM

## 2023-01-18 DIAGNOSIS — F17.200 TOBACCO USE DISORDER: ICD-10-CM

## 2023-01-18 DIAGNOSIS — H66.91 RIGHT ACUTE OTITIS MEDIA: ICD-10-CM

## 2023-01-18 DIAGNOSIS — E11.29 TYPE 2 DIABETES MELLITUS WITH MICROALBUMINURIA, WITHOUT LONG-TERM CURRENT USE OF INSULIN (H): Primary | ICD-10-CM

## 2023-01-18 DIAGNOSIS — J01.00 ACUTE NON-RECURRENT MAXILLARY SINUSITIS: ICD-10-CM

## 2023-01-18 LAB
CREAT UR-MCNC: 123 MG/DL
HBA1C MFR BLD: 6.7 % (ref 0–5.6)
MICROALBUMIN UR-MCNC: <12 MG/L
MICROALBUMIN/CREAT UR: NORMAL MG/G{CREAT}

## 2023-01-18 PROCEDURE — 82043 UR ALBUMIN QUANTITATIVE: CPT | Performed by: NURSE PRACTITIONER

## 2023-01-18 PROCEDURE — 82570 ASSAY OF URINE CREATININE: CPT | Performed by: NURSE PRACTITIONER

## 2023-01-18 PROCEDURE — 90472 IMMUNIZATION ADMIN EACH ADD: CPT | Performed by: NURSE PRACTITIONER

## 2023-01-18 PROCEDURE — 90750 HZV VACC RECOMBINANT IM: CPT | Performed by: NURSE PRACTITIONER

## 2023-01-18 PROCEDURE — 90471 IMMUNIZATION ADMIN: CPT | Performed by: NURSE PRACTITIONER

## 2023-01-18 PROCEDURE — 36415 COLL VENOUS BLD VENIPUNCTURE: CPT | Performed by: NURSE PRACTITIONER

## 2023-01-18 PROCEDURE — 83036 HEMOGLOBIN GLYCOSYLATED A1C: CPT | Performed by: NURSE PRACTITIONER

## 2023-01-18 PROCEDURE — 90682 RIV4 VACC RECOMBINANT DNA IM: CPT | Performed by: NURSE PRACTITIONER

## 2023-01-18 PROCEDURE — 99214 OFFICE O/P EST MOD 30 MIN: CPT | Mod: 25 | Performed by: NURSE PRACTITIONER

## 2023-01-18 RX ORDER — DULAGLUTIDE 1.5 MG/.5ML
1.5 INJECTION, SOLUTION SUBCUTANEOUS
Qty: 2 ML | Refills: 3 | Status: SHIPPED | OUTPATIENT
Start: 2023-01-18 | End: 2023-05-14

## 2023-01-18 RX ORDER — DULAGLUTIDE 0.75 MG/.5ML
0.75 INJECTION, SOLUTION SUBCUTANEOUS
Qty: 6 ML | Refills: 1 | Status: CANCELLED | OUTPATIENT
Start: 2023-01-18

## 2023-01-18 ASSESSMENT — ANXIETY QUESTIONNAIRES
5. BEING SO RESTLESS THAT IT IS HARD TO SIT STILL: NOT AT ALL
IF YOU CHECKED OFF ANY PROBLEMS ON THIS QUESTIONNAIRE, HOW DIFFICULT HAVE THESE PROBLEMS MADE IT FOR YOU TO DO YOUR WORK, TAKE CARE OF THINGS AT HOME, OR GET ALONG WITH OTHER PEOPLE: NOT DIFFICULT AT ALL
7. FEELING AFRAID AS IF SOMETHING AWFUL MIGHT HAPPEN: SEVERAL DAYS
2. NOT BEING ABLE TO STOP OR CONTROL WORRYING: SEVERAL DAYS
3. WORRYING TOO MUCH ABOUT DIFFERENT THINGS: SEVERAL DAYS
4. TROUBLE RELAXING: NOT AT ALL
6. BECOMING EASILY ANNOYED OR IRRITABLE: NOT AT ALL
GAD7 TOTAL SCORE: 4
GAD7 TOTAL SCORE: 4
8. IF YOU CHECKED OFF ANY PROBLEMS, HOW DIFFICULT HAVE THESE MADE IT FOR YOU TO DO YOUR WORK, TAKE CARE OF THINGS AT HOME, OR GET ALONG WITH OTHER PEOPLE?: NOT DIFFICULT AT ALL
7. FEELING AFRAID AS IF SOMETHING AWFUL MIGHT HAPPEN: SEVERAL DAYS
1. FEELING NERVOUS, ANXIOUS, OR ON EDGE: SEVERAL DAYS
GAD7 TOTAL SCORE: 4

## 2023-01-18 NOTE — PATIENT INSTRUCTIONS
Lung Cancer Screening   Frequently Asked Questions  If you are at high-risk for lung cancer, getting screened with low-dose computed tomography (LDCT) every year can help save your life. This handout offers answers to some of the most common questions about lung cancer screening. If you have other questions, please call [phone] ([phone]).     What is it?  Lung cancer screening uses special X-ray technology to create an image of your lung tissue. The exam is quick and easy and takes less than 10 seconds. We don t give you any medicine or use any needles. You can eat before and after the exam. You don t need to change your clothes as long as the clothing on your chest doesn t contain metal. But, you do need to be able to hold your breath for at least 6 seconds during the exam.    What is the goal of lung cancer screening?  The goal of lung cancer screening is to save lives. Many times, lung cancer is not found until a person starts having physical symptoms. Lung cancer screening can help detect lung cancer in the earliest stages when it may be easier to treat.    Who should be screened for lung cancer?  We suggest lung cancer screening for anyone who is at high-risk for lung cancer. You are in the high-risk group if you:     are between the ages of 55 and 79, and   have smoked at least 1 pack of cigarettes a day for 20 or more years, and   still smoke or have quit within the past 15 years.    However, if you have a new cough or shortness of breath, you should talk to your doctor before being screened.    Why does it matter if I have symptoms?  Certain symptoms can be a sign that you have a condition in your lungs that should be checked and treated by your doctor. These symptoms include fever, chest pain, a new or changing cough, shortness of breath that you have never felt before, coughing up blood or unexplained weight loss. Having any of these symptoms can greatly affect the results of lung cancer  screening.       Should all smokers get an LDCT lung cancer screening exam?  It depends. Lung cancer screening is for a very specific group of men and women who have a history of heavy smoking over a long period of time (see  Who should be screened for lung cancer  above).  I am in the high-risk group, but have been diagnosed with cancer in the past. Is LDCT lung cancer screening right for me?  In some cases, you should not have LDCT lung screening, such as when your doctor is already following your cancer with CT scan studies. Your doctor will help you decide if LDCT lung screening is right for you.  Do I need to have a screening exam every year?  Yes. If you are in the high-risk group described earlier, you should get an LDCT lung cancer screening exam every year until you are 79, or are no longer willing or able to undergo screening and possible procedures to diagnose and treat lung cancer.  How effective is LDCT at preventing death from lung cancer?  Studies have shown that LDCT lung cancer screening can lower the risk of death from lung cancer by 20 percent in people who are at high-risk.  What are the risks?  There are some risks and limitations of LDCT lung cancer screening. We want to make sure you understand the risks and benefits, so please let us know if you have any questions. Your doctor may want to talk with you more about these risks.   Radiation exposure: As with any exam that uses radiation, there is a very small increased risk of cancer. The amount of radiation in LDCT is small--about the same amount a person would get from a mammogram. Your doctor orders the exam when he or she feels the potential benefits outweigh the risks.   False negatives: No test is perfect, including LDCT. It is possible that you may have a medical condition, including lung cancer, that is not found during your exam. This is called a false negative result.   False positives and more testing: LDCT very often finds something  in the lung that could be cancer, but in fact is not. This is called a false positive result. False positive tests often cause anxiety. To make sure these findings are not cancer, you may need to have more tests. These tests will be done only if you give us permission. Sometimes patients need a treatment that can have side effects, such as a biopsy. For more information on false positives, see  What can I expect from the results?    Findings not related to lung cancer: Your LDCT exam also takes pictures of areas of your body next to your lungs. In a very small number of cases, the CT scan will show an abnormal finding in one of these areas, such as your kidneys, adrenal glands, liver or thyroid. This finding may not be serious, but you may need more tests. Your doctor can help you decide what other tests you may need, if any.  What can I expect from the results?  About 1 out of 4 LDCT exams will find something that may need more tests. Most of the time, these findings are lung nodules. Lung nodules are very small collections of tissue in the lung. These nodules are very common, and the vast majority--more than 97 percent--are not cancer (benign). Most are normal lymph nodes or small areas of scarring from past infections.  But, if a small lung nodule is found to be cancer, the cancer can be cured more than 90 percent of the time. To know if the nodule is cancer, we may need to get more images before your next yearly screening exam. If the nodule has suspicious features (for example, it is large, has an odd shape or grows over time), we will refer you to a specialist for further testing.  Will my doctor also get the results?  Yes. Your doctor will get a copy of your results.  Is it okay to keep smoking now that there s a cancer screening exam?  No. Tobacco is one of the strongest cancer-causing agents. It causes not only lung cancer, but other cancers and cardiovascular (heart) diseases as well. The damage caused by  smoking builds over time. This means that the longer you smoke, the higher your risk of disease. While it is never too late to quit, the sooner you quit, the better.  Where can I find help to quit smoking?  The best way to prevent lung cancer is to stop smoking. If you have already quit smoking, congratulations and keep it up! For help on quitting smoking, please call [employer] at 7-160-QUITNOW (1-510.914.4426) or the American [employer] at 1-358.582.4962 to find local resources near you.  One-on-one health coaching:  If you d prefer to work individually with a health care provider on tobacco cessation, we offer:     Medication Therapy Management:  Our specially trained pharmacists work closely with you and your doctor to help you quit smoking.  Call [phone] or [phone] (toll free).

## 2023-01-18 NOTE — LETTER
January 19, 2023      Mahendra Mathias  [address] [address]        Dear ,    We are writing to inform you of your test results.    There is no excess protein in the urine and your A1c indicates that type 2 diabetes is well controlled at this time.  Continue with the plan to increase the dose of Trulicity and at your next visit we will see whether we can discontinue the glipizide medication.  If he is experiencing low blood sugars, he can stop the medication before his next appointment.    Resulted Orders   Albumin Random Urine Quantitative with Creat Ratio   Result Value Ref Range    Albumin Urine mg/L <12.0 mg/L      Comment:      The reference ranges have not been established in urine albumin. The results should be integrated into the clinical context for interpretation.    Albumin Urine mg/g Cr        Comment:      Unable to calculate, urine albumin and/or urine creatinine is outside detectable limits.  Microalbuminuria is defined as an albumin:creatinine ratio of 17 to 299 for males and 25 to 299 for females. A ratio of albumin:creatinine of 300 or higher is indicative of overt proteinuria.  Due to biologic variability, positive results should be confirmed by a second, first-morning random or 24-hour timed urine specimen. If there is discrepancy, a third specimen is recommended. When 2 out of 3 results are in the microalbuminuria range, this is evidence for incipient nephropathy and warrants increased efforts at glucose control, blood pressure control, and institution of therapy with an angiotensin-converting-enzyme (ACE) inhibitor (if the patient can tolerate it).      Creatinine Urine mg/dL 123.0 mg/dL      Comment:      The reference ranges have not been established in urine creatinine. The results should be integrated into the clinical context for interpretation.   HEMOGLOBIN A1C   Result Value Ref Range    Hemoglobin A1C 6.7 (H) 0.0 - 5.6 %      Comment:      Normal <5.7%    Prediabetes 5.7-6.4%    Diabetes 6.5% or higher     Note: Adopted from ADA consensus guidelines.       If you have any questions or concerns, please call the clinic at the number listed above.       Sincerely,      Rosie Dorado NP                             Resulted

## 2023-01-18 NOTE — ASSESSMENT & PLAN NOTE
Reviewed lung cancer screening, he meets criteria. He declines today but was given more information

## 2023-01-19 NOTE — ASSESSMENT & PLAN NOTE
- Diabetes is well controlled with an A1c of 6.7% today  - Increase Trulicity to 1.5 mg weekly.  We will plan to discontinue the glipizide at his next office visit as long as blood sugars remain stable.

## 2023-02-05 DIAGNOSIS — F41.1 ANXIETY STATE: ICD-10-CM

## 2023-02-05 DIAGNOSIS — E11.29 TYPE 2 DIABETES MELLITUS WITH MICROALBUMINURIA, WITHOUT LONG-TERM CURRENT USE OF INSULIN (H): ICD-10-CM

## 2023-02-05 DIAGNOSIS — R80.9 TYPE 2 DIABETES MELLITUS WITH MICROALBUMINURIA, WITHOUT LONG-TERM CURRENT USE OF INSULIN (H): ICD-10-CM

## 2023-02-06 RX ORDER — ESCITALOPRAM OXALATE 20 MG/1
20 TABLET ORAL DAILY
Qty: 90 TABLET | Refills: 0 | Status: SHIPPED | OUTPATIENT
Start: 2023-02-06 | End: 2023-05-14

## 2023-02-06 RX ORDER — LISINOPRIL 2.5 MG/1
2.5 TABLET ORAL DAILY
Qty: 90 TABLET | Refills: 1 | Status: SHIPPED | OUTPATIENT
Start: 2023-02-06 | End: 2023-07-25

## 2023-02-06 NOTE — TELEPHONE ENCOUNTER
"Last Written Prescription Date:  1/5/23  Last Fill Quantity: 30,  # refills: 0   Last office visit provider:  1/18/23     Requested Prescriptions   Pending Prescriptions Disp Refills     escitalopram (LEXAPRO) 20 MG tablet [Pharmacy Med Name: Escitalopram Oxalate 20 MG Oral Tablet] 30 tablet 0     Sig: TAKE 1 TABLET BY MOUTH ONCE DAILY (DUE  FOR  AN  APPOINTMENT  WITH  DARNELL MEZA)       SSRIs Protocol Passed - 2/6/2023  2:34 PM        Passed - Recent (12 mo) or future (30 days) visit within the authorizing provider's specialty     Patient has had an office visit with the authorizing provider or a provider within the authorizing providers department within the previous 12 mos or has a future within next 30 days. See \"Patient Info\" tab in inbasket, or \"Choose Columns\" in Meds & Orders section of the refill encounter.              Passed - Medication is active on med list        Passed - Patient is age 18 or older           lisinopril (ZESTRIL) 2.5 MG tablet [Pharmacy Med Name: Lisinopril 2.5 MG Oral Tablet] 30 tablet 0     Sig: TAKE 1 TABLET BY MOUTH ONCE DAILY (DUE  FOR  AN  APPOINTMENT  WITH  DARNELL MEZA)       ACE Inhibitors (Including Combos) Protocol Passed - 2/6/2023  2:34 PM        Passed - Blood pressure under 140/90 in past 12 months     BP Readings from Last 3 Encounters:   01/18/23 120/70   07/20/22 105/75   12/09/21 118/76                 Passed - Recent (12 mo) or future (30 days) visit within the authorizing provider's specialty     Patient has had an office visit with the authorizing provider or a provider within the authorizing providers department within the previous 12 mos or has a future within next 30 days. See \"Patient Info\" tab in inbasket, or \"Choose Columns\" in Meds & Orders section of the refill encounter.              Passed - Medication is active on med list        Passed - Patient is age 18 or older        Passed - Normal serum creatinine on file in past 12 months     Recent Labs   Lab " Test 07/20/22  1505   CR 0.81       Ok to refill medication if creatinine is low          Passed - Normal serum potassium on file in past 12 months     Recent Labs   Lab Test 07/20/22  1505   POTASSIUM 4.4                  Vikram Burch RN 02/06/23 2:34 PM

## 2023-03-15 LAB — NONINV COLON CA DNA+OCC BLD SCRN STL QL: NEGATIVE

## 2023-04-04 DIAGNOSIS — R80.9 TYPE 2 DIABETES MELLITUS WITH MICROALBUMINURIA, WITHOUT LONG-TERM CURRENT USE OF INSULIN (H): ICD-10-CM

## 2023-04-04 DIAGNOSIS — E11.29 TYPE 2 DIABETES MELLITUS WITH MICROALBUMINURIA, WITHOUT LONG-TERM CURRENT USE OF INSULIN (H): ICD-10-CM

## 2023-04-05 RX ORDER — GLIPIZIDE 10 MG/1
20 TABLET, FILM COATED, EXTENDED RELEASE ORAL DAILY
Qty: 180 TABLET | Refills: 1 | Status: SHIPPED | OUTPATIENT
Start: 2023-04-05 | End: 2023-09-29

## 2023-04-05 NOTE — TELEPHONE ENCOUNTER
"Routing refill request to provider for review/approval because:  A break in medication  Patient needs appointment    Last Written Prescription Date:  7/20/2022  Last Fill Quantity: 180,  # refills: 1   Last office visit provider:  1/18/2023     Requested Prescriptions   Pending Prescriptions Disp Refills     glipiZIDE (GLUCOTROL XL) 10 MG 24 hr tablet 180 tablet 1     Sig: Take 2 tablets (20 mg) by mouth daily Due for appointment       Sulfonylurea Agents Passed - 4/4/2023  2:10 PM        Passed - Patient has documented A1c within the specified period of time.     If HgbA1C is 8 or greater, it needs to be on file within the past 3 months.  If less than 8, must be on file within the past 6 months.     Recent Labs   Lab Test 01/18/23  1127   A1C 6.7*             Passed - Medication is active on med list        Passed - Patient is age 18 or older        Passed - Patient has a recent creatinine (normal) within the past 12 mos.     Recent Labs   Lab Test 07/20/22  1505   CR 0.81       Ok to refill medication if creatinine is low          Passed - Recent (6 mo) or future (30 days) visit within the authorizing provider's specialty     Patient had office visit in the last 6 months or has a visit in the next 30 days with authorizing provider or within the authorizing provider's specialty.  See \"Patient Info\" tab in inbasket, or \"Choose Columns\" in Meds & Orders section of the refill encounter.                 OWEN ALEXANDRE RN 04/05/23 9:59 AM  "

## 2023-05-14 ENCOUNTER — HEALTH MAINTENANCE LETTER (OUTPATIENT)
Age: 63
End: 2023-05-14

## 2023-05-14 DIAGNOSIS — E11.29 TYPE 2 DIABETES MELLITUS WITH MICROALBUMINURIA, WITHOUT LONG-TERM CURRENT USE OF INSULIN (H): ICD-10-CM

## 2023-05-14 DIAGNOSIS — F41.1 ANXIETY STATE: ICD-10-CM

## 2023-05-14 DIAGNOSIS — R80.9 TYPE 2 DIABETES MELLITUS WITH MICROALBUMINURIA, WITHOUT LONG-TERM CURRENT USE OF INSULIN (H): ICD-10-CM

## 2023-05-14 RX ORDER — ESCITALOPRAM OXALATE 20 MG/1
TABLET ORAL
Qty: 90 TABLET | Refills: 2 | Status: SHIPPED | OUTPATIENT
Start: 2023-05-14 | End: 2024-03-06

## 2023-05-14 RX ORDER — DULAGLUTIDE 1.5 MG/.5ML
INJECTION, SOLUTION SUBCUTANEOUS
Qty: 2 ML | Refills: 2 | Status: SHIPPED | OUTPATIENT
Start: 2023-05-14 | End: 2023-08-08

## 2023-07-03 DIAGNOSIS — E11.29 TYPE 2 DIABETES MELLITUS WITH MICROALBUMINURIA, WITHOUT LONG-TERM CURRENT USE OF INSULIN (H): ICD-10-CM

## 2023-07-03 DIAGNOSIS — E78.5 HYPERLIPIDEMIA, UNSPECIFIED HYPERLIPIDEMIA TYPE: ICD-10-CM

## 2023-07-03 DIAGNOSIS — R80.9 TYPE 2 DIABETES MELLITUS WITH MICROALBUMINURIA, WITHOUT LONG-TERM CURRENT USE OF INSULIN (H): ICD-10-CM

## 2023-07-03 RX ORDER — ATORVASTATIN CALCIUM 20 MG/1
TABLET, FILM COATED ORAL
Qty: 90 TABLET | Refills: 0 | Status: SHIPPED | OUTPATIENT
Start: 2023-07-03 | End: 2023-10-06

## 2023-07-03 NOTE — TELEPHONE ENCOUNTER
"Last Written Prescription Date:  1/5/2023  Last Fill Quantity: 90,  # refills: 1   Last office visit provider:  1/18/2023     Requested Prescriptions   Pending Prescriptions Disp Refills     atorvastatin (LIPITOR) 20 MG tablet [Pharmacy Med Name: Atorvastatin Calcium 20 MG Oral Tablet] 90 tablet 0     Sig: Take 1 tablet by mouth once daily       Statins Protocol Passed - 7/3/2023  5:56 AM        Passed - LDL on file in past 12 months     Recent Labs   Lab Test 07/20/22  1505   LDL 89             Passed - No abnormal creatine kinase in past 12 months     No lab results found.             Passed - Recent (12 mo) or future (30 days) visit within the authorizing provider's specialty     Patient has had an office visit with the authorizing provider or a provider within the authorizing providers department within the previous 12 mos or has a future within next 30 days. See \"Patient Info\" tab in inbasket, or \"Choose Columns\" in Meds & Orders section of the refill encounter.              Passed - Medication is active on med list        Passed - Patient is age 18 or older             Kristina Brewster RN 07/03/23 12:27 PM  "

## 2023-07-12 ENCOUNTER — OFFICE VISIT (OUTPATIENT)
Dept: INTERNAL MEDICINE | Facility: CLINIC | Age: 63
End: 2023-07-12
Payer: COMMERCIAL

## 2023-07-12 VITALS
HEART RATE: 65 BPM | SYSTOLIC BLOOD PRESSURE: 120 MMHG | BODY MASS INDEX: 32.99 KG/M2 | WEIGHT: 205.3 LBS | HEIGHT: 66 IN | DIASTOLIC BLOOD PRESSURE: 70 MMHG | TEMPERATURE: 97.8 F | OXYGEN SATURATION: 95 % | RESPIRATION RATE: 20 BRPM

## 2023-07-12 DIAGNOSIS — E78.2 MIXED HYPERLIPIDEMIA: ICD-10-CM

## 2023-07-12 DIAGNOSIS — R80.9 TYPE 2 DIABETES MELLITUS WITH MICROALBUMINURIA, WITHOUT LONG-TERM CURRENT USE OF INSULIN (H): Primary | ICD-10-CM

## 2023-07-12 DIAGNOSIS — F41.1 ANXIETY STATE: ICD-10-CM

## 2023-07-12 DIAGNOSIS — I10 ESSENTIAL HYPERTENSION: ICD-10-CM

## 2023-07-12 DIAGNOSIS — F17.200 TOBACCO USE DISORDER: ICD-10-CM

## 2023-07-12 DIAGNOSIS — E11.29 TYPE 2 DIABETES MELLITUS WITH MICROALBUMINURIA, WITHOUT LONG-TERM CURRENT USE OF INSULIN (H): Primary | ICD-10-CM

## 2023-07-12 LAB
ALT SERPL W P-5'-P-CCNC: 21 U/L (ref 0–70)
ANION GAP SERPL CALCULATED.3IONS-SCNC: 10 MMOL/L (ref 7–15)
BUN SERPL-MCNC: 15.4 MG/DL (ref 8–23)
CALCIUM SERPL-MCNC: 9.4 MG/DL (ref 8.8–10.2)
CHLORIDE SERPL-SCNC: 105 MMOL/L (ref 98–107)
CHOLEST SERPL-MCNC: 120 MG/DL
CREAT SERPL-MCNC: 0.84 MG/DL (ref 0.67–1.17)
DEPRECATED HCO3 PLAS-SCNC: 23 MMOL/L (ref 22–29)
ERYTHROCYTE [DISTWIDTH] IN BLOOD BY AUTOMATED COUNT: 13.1 % (ref 10–15)
GFR SERPL CREATININE-BSD FRML MDRD: >90 ML/MIN/1.73M2
GLUCOSE SERPL-MCNC: 78 MG/DL (ref 70–99)
HBA1C MFR BLD: 6.2 % (ref 0–5.6)
HCT VFR BLD AUTO: 45.5 % (ref 40–53)
HDLC SERPL-MCNC: 42 MG/DL
HGB BLD-MCNC: 15.7 G/DL (ref 13.3–17.7)
LDLC SERPL CALC-MCNC: 53 MG/DL
MCH RBC QN AUTO: 32.2 PG (ref 26.5–33)
MCHC RBC AUTO-ENTMCNC: 34.5 G/DL (ref 31.5–36.5)
MCV RBC AUTO: 93 FL (ref 78–100)
NONHDLC SERPL-MCNC: 78 MG/DL
PLATELET # BLD AUTO: 135 10E3/UL (ref 150–450)
POTASSIUM SERPL-SCNC: 4.2 MMOL/L (ref 3.4–5.3)
RBC # BLD AUTO: 4.87 10E6/UL (ref 4.4–5.9)
SODIUM SERPL-SCNC: 138 MMOL/L (ref 136–145)
TRIGL SERPL-MCNC: 123 MG/DL
WBC # BLD AUTO: 9.8 10E3/UL (ref 4–11)

## 2023-07-12 PROCEDURE — 84460 ALANINE AMINO (ALT) (SGPT): CPT | Performed by: NURSE PRACTITIONER

## 2023-07-12 PROCEDURE — 80048 BASIC METABOLIC PNL TOTAL CA: CPT | Performed by: NURSE PRACTITIONER

## 2023-07-12 PROCEDURE — 85027 COMPLETE CBC AUTOMATED: CPT | Performed by: NURSE PRACTITIONER

## 2023-07-12 PROCEDURE — 83036 HEMOGLOBIN GLYCOSYLATED A1C: CPT | Performed by: NURSE PRACTITIONER

## 2023-07-12 PROCEDURE — 99214 OFFICE O/P EST MOD 30 MIN: CPT | Performed by: NURSE PRACTITIONER

## 2023-07-12 PROCEDURE — 80061 LIPID PANEL: CPT | Performed by: NURSE PRACTITIONER

## 2023-07-12 PROCEDURE — 36415 COLL VENOUS BLD VENIPUNCTURE: CPT | Performed by: NURSE PRACTITIONER

## 2023-07-12 RX ORDER — GLUCOSAMINE HCL/CHONDROITIN SU 500-400 MG
CAPSULE ORAL
Qty: 100 STRIP | Refills: 11 | Status: SHIPPED | OUTPATIENT
Start: 2023-07-12

## 2023-07-12 RX ORDER — NICOTINE 21 MG/24HR
1 PATCH, TRANSDERMAL 24 HOURS TRANSDERMAL EVERY 24 HOURS
Qty: 28 PATCH | Refills: 0 | Status: SHIPPED | OUTPATIENT
Start: 2023-07-12 | End: 2024-03-06

## 2023-07-12 ASSESSMENT — ENCOUNTER SYMPTOMS
NAUSEA: 1
FEVER: 0
SHORTNESS OF BREATH: 1
COUGH: 0
EYE PAIN: 0
PARESTHESIAS: 0
WEAKNESS: 1
DIZZINESS: 0
DYSURIA: 0
FREQUENCY: 0
CHILLS: 0
HEARTBURN: 0
HEADACHES: 0
CONSTIPATION: 0
NERVOUS/ANXIOUS: 1
PALPITATIONS: 0
DIARRHEA: 0
SORE THROAT: 0
MYALGIAS: 1
HEMATOCHEZIA: 0
JOINT SWELLING: 0
ABDOMINAL PAIN: 0
HEMATURIA: 0
ARTHRALGIAS: 1

## 2023-07-12 ASSESSMENT — PAIN SCALES - GENERAL: PAINLEVEL: NO PAIN (0)

## 2023-07-12 ASSESSMENT — ACTIVITIES OF DAILY LIVING (ADL): CURRENT_FUNCTION: NO ASSISTANCE NEEDED

## 2023-07-12 NOTE — PATIENT INSTRUCTIONS
Patient Education   Personalized Prevention Plan  You are due for the preventive services outlined below.  Your care team is available to assist you in scheduling these services.  If you have already completed any of these items, please share that information with your care team to update in your medical record.  Health Maintenance Due   Topic Date Due     COVID-19 Vaccine (3 - Moderna series) 12/03/2021     Diabetic Foot Exam  05/12/2022     ANNUAL REVIEW OF HM ORDERS  12/09/2022     Annual Wellness Visit  Never done     AORTIC ANEURYSM SCREENING (SYSTEM ASSIGNED)  Never done     A1C Lab  07/18/2023     Basic Metabolic Panel  07/20/2023     Cholesterol Lab  07/20/2023     Your Health Risk Assessment indicates you feel you are not in good health    A healthy lifestyle helps keep the body fit and the mind alert. It helps protect you from disease, helps you fight disease, and helps prevent chronic disease (disease that doesn't go away) from getting worse. This is important as you get older and begin to notice twinges in muscles and joints and a decline in the strength and stamina you once took for granted. A healthy lifestyle includes good healthcare, good nutrition, weight control, recreation, and regular exercise. Avoid harmful substances and do what you can to keep safe. Another part of a healthy lifestyle is stay mentally active and socially involved.    Good healthcare     Have a wellness visit every year.     If you have new symptoms, let us know right away. Don't wait until the next checkup.     Take medicines exactly as prescribed and keep your medicines in a safe place. Tell us if your medicine causes problems.   Healthy diet and weight control     Eat 3 or 4 small, nutritious, low-fat, high-fiber meals a day. Include a variety of fruits, vegetables, and whole-grain foods.     Make sure you get enough calcium in your diet. Calcium, vitamin D, and exercise help prevent osteoporosis (bone thinning).     If  you live alone, try eating with others when you can. That way you get a good meal and have company while you eat it.     Try to keep a healthy weight. If you eat more calories than your body uses for energy, it will be stored as fat and you will gain weight.     Recreation   Recreation is not limited to sports and team events. It includes any activity that provides relaxation, interest, enjoyment, and exercise. Recreation provides an outlet for physical, mental, and social energy. It can give a sense of worth and achievement. It can help you stay healthy.    Mental Exercise and Social Involvement  Mental and emotional health is as important as physical health. Keep in touch with friends and family. Stay as active as possible. Continue to learn and challenge yourself.   Things you can do to stay mentally active are:    Learn something new, like a foreign language or musical instrument.     Play SCRABBLE or do crossword puzzles. If you cannot find people to play these games with you at home, you can play them with others on your computer through the Internet.     Join a games club--anything from card games to chess or checkers or lawn bowling.     Start a new hobby.     Go back to school.     Volunteer.     Read.   Keep up with world events.    Exercise for a Healthier Heart  You may wonder how you can improve the health of your heart. If you re thinking about exercise, you re on the right track. You don t need to become an athlete. But you do need a certain amount of brisk exercise to help strengthen your heart. If you have been diagnosed with a heart condition, your healthcare provider may advise exercise to help your condition. To help make exercise a habit, choose safe, fun activities.      Exercise with a friend. When activity is fun, you're more likely to stick with it.     Before you start  Check with your healthcare provider before starting an exercise program. This is especially important if you haven't been  active for a while. It's also important if you have a long-term (chronic) health problem such as heart disease, diabetes, or obesity. Also check with your provider if you're at high risk for having these problems.   Why exercise?  Exercising regularly offers many healthy rewards. It can help you do all of these:     Improve your blood cholesterol level to help prevent further heart trouble.    Lower your blood pressure to help prevent a stroke or heart attack.    Control diabetes or reduce your risk of getting this disease.    Improve your heart and lung function.    Reach and stay at a healthy weight.    Make your muscles stronger so you can stay active.    Prevent falls and fractures by slowing the loss of bone mass (osteoporosis).    Manage stress better.    Improve your sense of self and your body image.  Exercise tips      Ease into your routine. Set small goals. Then build on them. Talk with your healthcare provider first before starting an exercise routine if you're not sure what your activity level should be.    Exercise on most days. Aim for a total of at least 150 minutes (2 hours and 30 minutes) or more of moderate-intensity aerobic activity each week. You could also do 75 minutes (1 hour and 15 minutes) or more of vigorous-intensity aerobic activity each week. Or try for a combination of both. Moderate activity means that you breathe heavier and your heart rate increases, but you can still talk. Think about doing at least 30 minutes of moderate exercise, 5 times a week. It's OK to work up to the 30-minute period over time. Examples of moderate-intensity activity are brisk walking, gardening, and water aerobics.    Step up your daily activity level.  Along with your exercise program, try being more active the whole day. Walk instead of drive. Or park further away so that you take more steps each day. Do more household tasks or yard work. You may not be able to meet the advised amount of physical activity.  But doing some moderate- or vigorous-intensity aerobic activity can help reduce your risk for heart disease. Your healthcare provider can help you figure out what is best for you.    Choose 1 or more activities you enjoy.  Walking is one of the easiest things you can do. You can also try swimming, riding a bike, dancing, or taking an exercise class.    Call 911  Call 911 right away if any of these occur:     Chest pain that doesn't go away quickly with rest    New burning, tightness, pressure, or heaviness in your chest, neck, shoulders, back, or arms    Abnormal or severe shortness of breath    A very fast or irregular heartbeat (palpitations)    Fainting  When to call your healthcare provider  Call your healthcare provider if you have any of these:     Dizziness or lightheadedness    Mild shortness of breath or chest pain    Increased or new joint or muscle pain    Robert last reviewed this educational content on 7/1/2022 2000-2023 [employer]. All rights reserved. This information is not intended as a substitute for professional medical care. Always follow your healthcare professional's instructions.          Understanding USDA MyPlate  The USDA has guidelines to help you make healthy food choices. These are called MyPlate. MyPlate shows the food groups that make up healthy meals using the image of a place setting. Before you eat, think about the healthiest choices for what to put on your plate or in your cup or bowl. To learn more about building a healthy plate, visit www.myplate.gov.     The food groups    Fruits. Any fruit or 100% fruit juice counts as part of the Fruit Group. Fruits may be fresh, canned, frozen, or dried, and may be whole, cut-up, or pureed. Make 1/2 of your plate fruits and vegetables.    Vegetables. Any vegetable or 100% vegetable juice counts as a member of the Vegetable Group. Vegetables may be fresh, frozen, canned, or dried. They can be served raw or cooked and may be  whole, cut-up, or mashed. Make 1/2 of your plate fruits and vegetables.    Grains. All foods made from grains are part of the Grains Group. These include wheat, rice, oats, cornmeal, and barley. Grains are often used to make foods such as bread, pasta, oatmeal, cereal, tortillas, and grits. Grains should be no more than 1/4 of your plate. At least half of your grains should be whole grains.    Protein. This group includes meat, poultry, seafood, beans and peas, eggs, processed soy products (such as tofu), nuts (including nut butters), and seeds. Make protein choices no more than 1/4 of your plate. Meat and poultry choices should be lean or low fat.    Dairy. The Dairy Group includes all fluid milk products and foods made from milk that contain calcium, such as yogurt and cheese. (Foods that have little calcium, such as cream, butter, and cream cheese, are not part of this group.) Most dairy choices should be low-fat or fat-free.  Things to limit  Eating healthy also means limiting these things in your diet:    Oils. Oils aren't a food group, but they do contain essential nutrients. These are fats that are liquid at room temperature. Including small amounts of oils in your diet is fine and can even be good for you. But it's important to watch how much oil you include in your diet. Oils include avocado, canola, corn, olive, soybean, vegetable, and sunflower. Foods that are mainly oil include mayonnaise, certain salad dressings, and soft margarines. You likely already get your daily oil allowance from the foods you eat.    Salt (sodium).  Many processed foods have a lot of sodium. To keep sodium intake down, eat fresh vegetables, meats, poultry, and seafood when possible. Buy low-sodium, reduced-sodium, or no-salt-added food products at the store. And don't add salt to your meals at home. Instead, season them with herbs and spices such as dill, oregano, cumin, and paprika. Or try adding flavor with vinegar, onion,  garlic, or lemon or lime zest and juice.    Saturated fat . Saturated fats are most often found in animal products such as beef, pork, and chicken. They are often solid at room temperature, such as butter. To reduce your saturated fat intake, choose leaner cuts of meat and poultry. And try healthier cooking methods such as grilling, broiling, roasting, or baking. For a simple lower-fat swap, use plain nonfat yogurt instead of mayonnaise when making potato salad or macaroni salad.    Added sugars.  These are sugars added to foods. They are in foods such as ice cream, candy, soda, fruit drinks, sports drinks, energy drinks, cookies, pastries, jams, and syrups. Cut down on added sugars by sharing sweet treats with a family member or friend. You can also choose fruit for dessert, and drink water or other unsweetened beverages.    Alcohol. Alcohol contains calories but few nutrients. If you don't drink alcohol, don't start drinking for any reason. But if you do choose to drink alcohol, do so only in moderation. This means no more than 2 drinks in a day for men and no more than 1 drink per day for women, on days when you have alcohol.  [employer] last reviewed this educational content on 1/1/2023 2000-2023 [employer]. All rights reserved. This information is not intended as a substitute for professional medical care. Always follow your healthcare professional's instructions.          Signs of Hearing Loss  Hearing loss is a problem shared by many people. In fact, it's one of the most common health problems, particularly as people age. Most people aged 65 and older have some hearing loss. By age 80, almost everyone does. Hearing loss often occurs slowly over the years. So, you may not realize your hearing has gotten worse.   When sudden hearing loss occurs, it's important to contact your healthcare provider right away. Your provider will do a medical exam and a hearing exam as soon as possible. This is to  help find the cause and type of your sudden hearing loss. Based on your diagnosis, your healthcare provider will discuss possible treatments.      Hearing much better with one ear can be a sign of hearing loss.     Have your hearing checked  Call your healthcare provider if you:     Have to strain to hear normal conversation    Have to watch other people s faces very carefully to follow what they re saying    Need to ask people to repeat what they ve said    Often misunderstand what people are saying    Turn the volume of the television or radio up so high that others complain    Feel that people are mumbling when they re talking to you    Find that the effort to hear leaves you feeling tired and irritated    Notice, when using the phone, that you hear better with one ear than the other  [employer] last reviewed this educational content on 6/1/2022 2000-2023 [employer]. All rights reserved. This information is not intended as a substitute for professional medical care. Always follow your healthcare professional's instructions.        Your Health Risk Assessment indicates you feel you are not in good emotional health.    Recreation   Recreation is not limited to sports and team events. It includes any activity that provides relaxation, interest, enjoyment, and exercise. Recreation provides an outlet for physical, mental, and social energy. It can give a sense of worth and achievement. It can help you stay healthy.    Mental Exercise and Social Involvement  Mental and emotional health is as important as physical health. Keep in touch with friends and family. Stay as active as possible. Continue to learn and challenge yourself.   Things you can do to stay mentally active are:    Learn something new, like a foreign language or musical instrument.     Play SCRABBLE or do crossword puzzles. If you cannot find people to play these games with you at home, you can play them with others on your computer  through the Internet.     Join a games club--anything from card games to chess or checkers or lawn bowling.     Start a new hobby.     Go back to school.     Volunteer.     Read.   Keep up with world events.

## 2023-07-12 NOTE — PROGRESS NOTES
"  Assessment & Plan   Problem List Items Addressed This Visit        Endocrine    Hyperlipidemia     Continue statin          Relevant Orders    Lipid panel reflex to direct LDL Fasting (Completed)    Basic metabolic panel (Completed)    CBC with platelets (Completed)    ALT (Completed)    Type 2 diabetes mellitus (H) - Primary     Stable          Relevant Medications    Glucose Blood (BLOOD GLUCOSE TEST STRIPS) STRP    Other Relevant Orders    Hemoglobin A1c (Completed)       Circulatory    Essential hypertension     Stable             Behavioral    Nicotine Dependence     Interested in nicotine patches         Relevant Medications    nicotine (NICODERM CQ) 21 MG/24HR 24 hr patch    nicotine (NICODERM CQ) 14 MG/24HR 24 hr patch    nicotine (NICODERM CQ) 7 MG/24HR 24 hr patch       Other    Anxiety, insomnia      He reports he is doing well with escitalopram           - He declines a COVID booster shot  - He declines lung cancer screening              BMI:   Estimated body mass index is 33.14 kg/m  as calculated from the following:    Height as of this encounter: 1.676 m (5' 6\").    Weight as of this encounter: 93.1 kg (205 lb 4.8 oz).       Rosie Dorado NP  [facility]      Kaleb Mccray is a 63 year old, presenting for the following health issues:  Physical (Welcome to medicare )        7/12/2023    11:04 AM   Additional Questions   Roomed by MORRO Wilburn   Accompanied by n/a     SHIRIN   DM- tolerating Trulicity well.    He was scheduled for a Welcome to Medicare visit but does not have a Part B plan. This visit was switched to an office visit.     He intends to quit smoking with nicotine patches.       Objective    /70 (BP Location: Right arm, Patient Position: Sitting, Cuff Size: Adult Regular)   Pulse 65   Temp 97.8  F (36.6  C) (Oral)   Resp 20   Ht 1.676 m (5' 6\")   Wt 93.1 kg (205 lb 4.8 oz)   SpO2 95%   BMI 33.14 kg/m    Body mass index is 33.14 kg/m .  Physical Exam "   GENERAL: healthy, alert and no distress  RESP: lungs clear to auscultation - no rales, rhonchi or wheezes  CV: regular rate and rhythm, normal S1 S2, no S3 or S4, no murmur, click or rub, no peripheral edema and peripheral pulses strong  Diabetic foot exam: normal DP and PT pulses, no trophic changes or ulcerative lesions and normal sensory exam

## 2023-07-25 DIAGNOSIS — R80.9 TYPE 2 DIABETES MELLITUS WITH MICROALBUMINURIA, WITHOUT LONG-TERM CURRENT USE OF INSULIN (H): ICD-10-CM

## 2023-07-25 DIAGNOSIS — E11.29 TYPE 2 DIABETES MELLITUS WITH MICROALBUMINURIA, WITHOUT LONG-TERM CURRENT USE OF INSULIN (H): ICD-10-CM

## 2023-07-25 RX ORDER — LISINOPRIL 2.5 MG/1
TABLET ORAL
Qty: 90 TABLET | Refills: 3 | Status: SHIPPED | OUTPATIENT
Start: 2023-07-25 | End: 2024-07-17

## 2023-08-08 DIAGNOSIS — R80.9 TYPE 2 DIABETES MELLITUS WITH MICROALBUMINURIA, WITHOUT LONG-TERM CURRENT USE OF INSULIN (H): ICD-10-CM

## 2023-08-08 DIAGNOSIS — E11.29 TYPE 2 DIABETES MELLITUS WITH MICROALBUMINURIA, WITHOUT LONG-TERM CURRENT USE OF INSULIN (H): ICD-10-CM

## 2023-08-08 RX ORDER — DULAGLUTIDE 1.5 MG/.5ML
INJECTION, SOLUTION SUBCUTANEOUS
Qty: 4 ML | Refills: 0 | Status: SHIPPED | OUTPATIENT
Start: 2023-08-08 | End: 2023-09-02

## 2023-09-02 DIAGNOSIS — R80.9 TYPE 2 DIABETES MELLITUS WITH MICROALBUMINURIA, WITHOUT LONG-TERM CURRENT USE OF INSULIN (H): ICD-10-CM

## 2023-09-02 DIAGNOSIS — E11.29 TYPE 2 DIABETES MELLITUS WITH MICROALBUMINURIA, WITHOUT LONG-TERM CURRENT USE OF INSULIN (H): ICD-10-CM

## 2023-09-02 RX ORDER — DULAGLUTIDE 1.5 MG/.5ML
INJECTION, SOLUTION SUBCUTANEOUS
Qty: 4 ML | Refills: 1 | Status: SHIPPED | OUTPATIENT
Start: 2023-09-02 | End: 2023-10-30

## 2023-09-02 NOTE — TELEPHONE ENCOUNTER
"Last Written Prescription Date:  8/8/2023  Last Fill Quantity: 4ml,  # refills: 0   Last office visit provider:  7/12/2023     Requested Prescriptions   Pending Prescriptions Disp Refills    TRULICITY 1.5 MG/0.5ML pen [Pharmacy Med Name: Trulicity 1.5 MG/0.5ML Subcutaneous Solution Pen-injector] 4 mL 0     Sig: INJECT 1.5 MG  SUBCUTANEOUSLY ONCE A WEEK       GLP-1 Agonists Protocol Passed - 9/2/2023  4:11 PM        Passed - HgbA1C in past 3 or 6 months     If HgbA1C is 8 or greater, it needs to be on file within the past 3 months.  If less than 8, must be on file within the past 6 months.     Recent Labs   Lab Test 07/12/23  1142   A1C 6.2*             Passed - Medication is active on med list        Passed - Patient is age 18 or older        Passed - Normal serum creatinine on file in past 12 months     Recent Labs   Lab Test 07/12/23  1142   CR 0.84       Ok to refill medication if creatinine is low          Passed - Recent (6 mo) or future (30 days) visit within the authorizing provider's specialty     Patient had office visit in the last 6 months or has a visit in the next 30 days with authorizing provider.  See \"Patient Info\" tab in inbasket, or \"Choose Columns\" in Meds & Orders section of the refill encounter.                 Marybeth Martines RN 09/02/23 6:44 PM  "

## 2023-09-29 DIAGNOSIS — E11.29 TYPE 2 DIABETES MELLITUS WITH MICROALBUMINURIA, WITHOUT LONG-TERM CURRENT USE OF INSULIN (H): ICD-10-CM

## 2023-09-29 DIAGNOSIS — R80.9 TYPE 2 DIABETES MELLITUS WITH MICROALBUMINURIA, WITHOUT LONG-TERM CURRENT USE OF INSULIN (H): ICD-10-CM

## 2023-09-29 RX ORDER — GLIPIZIDE 10 MG/1
20 TABLET, FILM COATED, EXTENDED RELEASE ORAL DAILY
Qty: 180 TABLET | Refills: 0 | Status: SHIPPED | OUTPATIENT
Start: 2023-09-29 | End: 2024-01-09

## 2023-10-06 DIAGNOSIS — R80.9 TYPE 2 DIABETES MELLITUS WITH MICROALBUMINURIA, WITHOUT LONG-TERM CURRENT USE OF INSULIN (H): ICD-10-CM

## 2023-10-06 DIAGNOSIS — E11.29 TYPE 2 DIABETES MELLITUS WITH MICROALBUMINURIA, WITHOUT LONG-TERM CURRENT USE OF INSULIN (H): ICD-10-CM

## 2023-10-06 DIAGNOSIS — E78.5 HYPERLIPIDEMIA, UNSPECIFIED HYPERLIPIDEMIA TYPE: ICD-10-CM

## 2023-10-06 RX ORDER — ATORVASTATIN CALCIUM 20 MG/1
TABLET, FILM COATED ORAL
Qty: 90 TABLET | Refills: 1 | Status: SHIPPED | OUTPATIENT
Start: 2023-10-06 | End: 2024-03-06

## 2023-10-09 ENCOUNTER — NURSE TRIAGE (OUTPATIENT)
Dept: NURSING | Facility: CLINIC | Age: 63
End: 2023-10-09
Payer: COMMERCIAL

## 2023-10-09 ENCOUNTER — TELEPHONE (OUTPATIENT)
Dept: INTERNAL MEDICINE | Facility: CLINIC | Age: 63
End: 2023-10-09
Payer: COMMERCIAL

## 2023-10-09 DIAGNOSIS — F41.1 ANXIETY STATE: ICD-10-CM

## 2023-10-09 RX ORDER — LORAZEPAM 0.5 MG/1
0.5 TABLET ORAL DAILY PRN
Qty: 15 TABLET | Refills: 0 | Status: CANCELLED | OUTPATIENT
Start: 2023-10-09

## 2023-10-09 RX ORDER — LORAZEPAM 0.5 MG/1
0.5 TABLET ORAL DAILY PRN
Qty: 15 TABLET | Refills: 0 | Status: SHIPPED | OUTPATIENT
Start: 2023-10-09 | End: 2023-12-21

## 2023-10-09 RX ORDER — LORAZEPAM 0.5 MG/1
0.5 TABLET ORAL DAILY PRN
Qty: 15 TABLET | Refills: 0 | Status: SHIPPED | OUTPATIENT
Start: 2023-10-09 | End: 2023-10-09

## 2023-10-09 NOTE — TELEPHONE ENCOUNTER
Wife calling for RX of Lorazepam to be sent to alternative pharmacy. PCP sent over RX to [facility], which is out of stock of the medication until December.     Wife called [facility] in [place] and they do have the RX in stock, pended.    Once sent over, please call patient so he can go pick this up.    [phone], okay to leave a message.  Kennedi Gagnon, CMA

## 2023-10-09 NOTE — TELEPHONE ENCOUNTER
Patient's refill of Lorazepam was sent to [facility] in [place], however, it is back ordered until December.      Patient wants the prescription now sent to:    [facility] [place] [address] [place] [address]      Reason for Disposition   [1] Prescription prescribed recently is not at pharmacy AND [2] triager has access to patient's EMR AND [3] prescription is recorded in the EMR   Caller requesting a CONTROLLED substance prescription refill (e.g., narcotics, ADHD medicines)    Additional Information   Negative: [1] Intentional drug overdose AND [2] suicidal thoughts or ideas   Negative: Drug overdose and triager unable to answer question   Negative: Caller requesting a renewal or refill of a medicine patient is currently taking   Negative: Caller requesting information unrelated to medicine   Negative: Caller requesting information about COVID-19 Vaccine   Negative: Caller requesting information about Emergency Contraception   Negative: Caller requesting information about Combined Birth Control Pills   Negative: Caller requesting information about Progestin Birth Control Pills   Negative: Caller requesting information about Post-Op pain or medicines   Negative: Caller requesting a prescription antibiotic (such as Penicillin) for Strep throat and has a positive culture result   Negative: Caller requesting a prescription anti-viral med (such as Tamiflu) and has influenza (flu) symptoms   Negative: Immunization reaction suspected   Negative: Rash while taking a medicine or within 3 days of stopping it   Negative: [1] Asthma and [2] having symptoms of asthma (cough, wheezing, etc.)   Negative: [1] Symptom of illness (e.g., headache, abdominal pain, earache, vomiting) AND [2] more than mild   Negative: Breastfeeding questions about mother's medicines and diet   Negative: MORE THAN A DOUBLE DOSE of a prescription or over-the-counter (OTC) drug   Negative: [1] DOUBLE DOSE (an extra dose or lesser amount) of  prescription drug AND [2] any symptoms (e.g., dizziness, nausea, pain, sleepiness)   Negative: [1] DOUBLE DOSE (an extra dose or lesser amount) of over-the-counter (OTC) drug AND [2] any symptoms (e.g., dizziness, nausea, pain, sleepiness)   Negative: Took another person's prescription drug   Negative: [1] DOUBLE DOSE (an extra dose or lesser amount) of prescription drug AND [2] NO symptoms  (Exception: A double dose of antibiotics.)   Negative: Diabetes drug error or overdose (e.g., took wrong type of insulin or took extra dose)   Negative: [1] Prescription not at pharmacy AND [2] was prescribed by PCP recently (Exception: Triager has access to EMR and prescription is recorded there. Go to Home Care and confirm for pharmacy.)   Negative: [1] Pharmacy calling with prescription question AND [2] triager unable to answer question   Negative: [1] Caller has URGENT medicine question about med that PCP or specialist prescribed AND [2] triager unable to answer question   Negative: Medicine patch causing local rash or itching   Negative: [1] Caller has medicine question about med NOT prescribed by PCP AND [2] triager unable to answer question (e.g., compatibility with other med, storage)   Negative: Prescription request for new medicine (not a refill)   Negative: [1] Caller has NON-URGENT medicine question about med that PCP prescribed AND [2] triager unable to answer question   Negative: Caller wants to use a complementary or alternative medicine   Negative: Prescription request for new medicine (not a refill)   Negative: [1] Prescription refill request for ESSENTIAL medicine (i.e., likelihood of harm to patient if not taken) AND [2] triager unable to refill per department policy   Negative: [1] Prescription not at pharmacy AND [2] was prescribed by PCP recently  (Exception: Triager has access to EMR and prescription is recorded there. Go to Home Care and confirm for pharmacy.)   Negative: [1] Pharmacy calling with  prescription questions AND [2] triager unable to answer question   Negative: New-onset or worsening symptoms, see that guideline (e.g., diarrhea, runny nose, sore throat)   Negative: Medicine question not related to refill or renewal   Negative: Caller (e.g., patient or pharmacist) requesting information about a new medicine   Negative: Caller requesting information unrelated to medicine    Protocols used: Medication Question Call-A-AH, Medication Refill and Renewal Call-A-AH

## 2023-10-28 DIAGNOSIS — E11.29 TYPE 2 DIABETES MELLITUS WITH MICROALBUMINURIA, WITHOUT LONG-TERM CURRENT USE OF INSULIN (H): ICD-10-CM

## 2023-10-28 DIAGNOSIS — R80.9 TYPE 2 DIABETES MELLITUS WITH MICROALBUMINURIA, WITHOUT LONG-TERM CURRENT USE OF INSULIN (H): ICD-10-CM

## 2023-10-30 RX ORDER — DULAGLUTIDE 1.5 MG/.5ML
INJECTION, SOLUTION SUBCUTANEOUS
Qty: 4 ML | Refills: 0 | Status: SHIPPED | OUTPATIENT
Start: 2023-10-30 | End: 2023-11-29

## 2023-11-19 ENCOUNTER — HOSPITAL ENCOUNTER (INPATIENT)
Facility: CLINIC | Age: 63
LOS: 8 days | Discharge: HOME | DRG: 190 | End: 2023-11-27
Attending: HOSPITALIST | Admitting: HOSPITALIST
Payer: COMMERCIAL

## 2023-11-19 DIAGNOSIS — J44.1 COPD EXACERBATION (H): Primary | ICD-10-CM

## 2023-11-19 DIAGNOSIS — J96.01 ACUTE RESPIRATORY FAILURE WITH HYPOXIA (H): ICD-10-CM

## 2023-11-19 LAB — GLUCOSE BLDC GLUCOMTR-MCNC: 154 MG/DL (ref 70–99)

## 2023-11-19 PROCEDURE — 250N000013 HC RX MED GY IP 250 OP 250 PS 637: Performed by: HOSPITALIST

## 2023-11-19 PROCEDURE — 120N000001 HC R&B MED SURG/OB

## 2023-11-19 PROCEDURE — 250N000009 HC RX 250: Performed by: HOSPITALIST

## 2023-11-19 PROCEDURE — 999N000157 HC STATISTIC RCP TIME EA 10 MIN

## 2023-11-19 PROCEDURE — 94640 AIRWAY INHALATION TREATMENT: CPT | Mod: 76

## 2023-11-19 PROCEDURE — 99223 1ST HOSP IP/OBS HIGH 75: CPT | Performed by: HOSPITALIST

## 2023-11-19 RX ORDER — CALCIUM CARBONATE 500 MG/1
1000 TABLET, CHEWABLE ORAL 4 TIMES DAILY PRN
Status: DISCONTINUED | OUTPATIENT
Start: 2023-11-19 | End: 2023-11-27 | Stop reason: HOSPADM

## 2023-11-19 RX ORDER — ACETAMINOPHEN 325 MG/1
650 TABLET ORAL EVERY 4 HOURS PRN
Status: DISCONTINUED | OUTPATIENT
Start: 2023-11-19 | End: 2023-11-27 | Stop reason: HOSPADM

## 2023-11-19 RX ORDER — ACETAMINOPHEN 650 MG/1
650 SUPPOSITORY RECTAL EVERY 4 HOURS PRN
Status: DISCONTINUED | OUTPATIENT
Start: 2023-11-19 | End: 2023-11-27 | Stop reason: HOSPADM

## 2023-11-19 RX ORDER — ONDANSETRON 4 MG/1
4 TABLET, ORALLY DISINTEGRATING ORAL EVERY 6 HOURS PRN
Status: DISCONTINUED | OUTPATIENT
Start: 2023-11-19 | End: 2023-11-27 | Stop reason: HOSPADM

## 2023-11-19 RX ORDER — PREDNISONE 20 MG/1
40 TABLET ORAL DAILY
Status: DISCONTINUED | OUTPATIENT
Start: 2023-11-20 | End: 2023-11-25

## 2023-11-19 RX ORDER — NICOTINE 21 MG/24HR
1 PATCH, TRANSDERMAL 24 HOURS TRANSDERMAL DAILY
Status: DISCONTINUED | OUTPATIENT
Start: 2023-11-19 | End: 2023-11-27 | Stop reason: HOSPADM

## 2023-11-19 RX ORDER — AMOXICILLIN 250 MG
1 CAPSULE ORAL 2 TIMES DAILY PRN
Status: DISCONTINUED | OUTPATIENT
Start: 2023-11-19 | End: 2023-11-27 | Stop reason: HOSPADM

## 2023-11-19 RX ORDER — LIDOCAINE 40 MG/G
CREAM TOPICAL
Status: DISCONTINUED | OUTPATIENT
Start: 2023-11-19 | End: 2023-11-27 | Stop reason: HOSPADM

## 2023-11-19 RX ORDER — AMOXICILLIN 250 MG
2 CAPSULE ORAL 2 TIMES DAILY PRN
Status: DISCONTINUED | OUTPATIENT
Start: 2023-11-19 | End: 2023-11-27 | Stop reason: HOSPADM

## 2023-11-19 RX ORDER — ONDANSETRON 2 MG/ML
4 INJECTION INTRAMUSCULAR; INTRAVENOUS EVERY 6 HOURS PRN
Status: DISCONTINUED | OUTPATIENT
Start: 2023-11-19 | End: 2023-11-27 | Stop reason: HOSPADM

## 2023-11-19 RX ORDER — ACETYLCYSTEINE 200 MG/ML
4 SOLUTION ORAL; RESPIRATORY (INHALATION) 4 TIMES DAILY
Status: DISCONTINUED | OUTPATIENT
Start: 2023-11-20 | End: 2023-11-21

## 2023-11-19 RX ORDER — IPRATROPIUM BROMIDE AND ALBUTEROL SULFATE 2.5; .5 MG/3ML; MG/3ML
3 SOLUTION RESPIRATORY (INHALATION)
Status: DISCONTINUED | OUTPATIENT
Start: 2023-11-20 | End: 2023-11-22

## 2023-11-19 RX ORDER — ACETYLCYSTEINE 200 MG/ML
4 SOLUTION ORAL; RESPIRATORY (INHALATION)
Status: DISCONTINUED | OUTPATIENT
Start: 2023-11-19 | End: 2023-11-19

## 2023-11-19 RX ORDER — CEFTRIAXONE 1 G/1
1 INJECTION, POWDER, FOR SOLUTION INTRAMUSCULAR; INTRAVENOUS EVERY 24 HOURS
Status: DISCONTINUED | OUTPATIENT
Start: 2023-11-20 | End: 2023-11-20

## 2023-11-19 RX ORDER — POLYETHYLENE GLYCOL 3350 17 G
4 POWDER IN PACKET (EA) ORAL
Status: DISCONTINUED | OUTPATIENT
Start: 2023-11-19 | End: 2023-11-27 | Stop reason: HOSPADM

## 2023-11-19 RX ORDER — IPRATROPIUM BROMIDE AND ALBUTEROL SULFATE 2.5; .5 MG/3ML; MG/3ML
3 SOLUTION RESPIRATORY (INHALATION)
Status: DISCONTINUED | OUTPATIENT
Start: 2023-11-19 | End: 2023-11-19

## 2023-11-19 RX ADMIN — IPRATROPIUM BROMIDE AND ALBUTEROL SULFATE 3 ML: .5; 3 SOLUTION RESPIRATORY (INHALATION) at 21:19

## 2023-11-19 RX ADMIN — NICOTINE 1 PATCH: 21 PATCH, EXTENDED RELEASE TRANSDERMAL at 21:11

## 2023-11-19 RX ADMIN — ACETYLCYSTEINE 4 ML: 200 SOLUTION ORAL; RESPIRATORY (INHALATION) at 21:20

## 2023-11-19 ASSESSMENT — ACTIVITIES OF DAILY LIVING (ADL)
ADLS_ACUITY_SCORE: 35
ADLS_ACUITY_SCORE: 36

## 2023-11-20 LAB
ANION GAP SERPL CALCULATED.3IONS-SCNC: 13 MMOL/L (ref 7–15)
BACTERIA SPT CULT: NORMAL
BUN SERPL-MCNC: 14.7 MG/DL (ref 8–23)
CALCIUM SERPL-MCNC: 8.6 MG/DL (ref 8.8–10.2)
CHLORIDE SERPL-SCNC: 103 MMOL/L (ref 98–107)
CREAT SERPL-MCNC: 0.75 MG/DL (ref 0.67–1.17)
DEPRECATED HCO3 PLAS-SCNC: 23 MMOL/L (ref 22–29)
EGFRCR SERPLBLD CKD-EPI 2021: >90 ML/MIN/1.73M2
ERYTHROCYTE [DISTWIDTH] IN BLOOD BY AUTOMATED COUNT: 13.3 % (ref 10–15)
GLUCOSE BLDC GLUCOMTR-MCNC: 147 MG/DL (ref 70–99)
GLUCOSE BLDC GLUCOMTR-MCNC: 186 MG/DL (ref 70–99)
GLUCOSE BLDC GLUCOMTR-MCNC: 190 MG/DL (ref 70–99)
GLUCOSE SERPL-MCNC: 130 MG/DL (ref 70–99)
GRAM STAIN RESULT: NORMAL
HBA1C MFR BLD: 6.6 %
HCT VFR BLD AUTO: 47.6 % (ref 40–53)
HGB BLD-MCNC: 15.9 G/DL (ref 13.3–17.7)
MCH RBC QN AUTO: 31.3 PG (ref 26.5–33)
MCHC RBC AUTO-ENTMCNC: 33.4 G/DL (ref 31.5–36.5)
MCV RBC AUTO: 94 FL (ref 78–100)
PLATELET # BLD AUTO: 167 10E3/UL (ref 150–450)
POTASSIUM SERPL-SCNC: 4.3 MMOL/L (ref 3.4–5.3)
RBC # BLD AUTO: 5.08 10E6/UL (ref 4.4–5.9)
SODIUM SERPL-SCNC: 139 MMOL/L (ref 135–145)
WBC # BLD AUTO: 7.4 10E3/UL (ref 4–11)

## 2023-11-20 PROCEDURE — 120N000001 HC R&B MED SURG/OB

## 2023-11-20 PROCEDURE — 87205 SMEAR GRAM STAIN: CPT | Performed by: HOSPITALIST

## 2023-11-20 PROCEDURE — 85027 COMPLETE CBC AUTOMATED: CPT | Performed by: HOSPITALIST

## 2023-11-20 PROCEDURE — 250N000011 HC RX IP 250 OP 636: Mod: JZ | Performed by: HOSPITALIST

## 2023-11-20 PROCEDURE — 250N000013 HC RX MED GY IP 250 OP 250 PS 637: Performed by: HOSPITALIST

## 2023-11-20 PROCEDURE — 250N000012 HC RX MED GY IP 250 OP 636 PS 637: Performed by: HOSPITALIST

## 2023-11-20 PROCEDURE — 94640 AIRWAY INHALATION TREATMENT: CPT | Mod: 76

## 2023-11-20 PROCEDURE — 94640 AIRWAY INHALATION TREATMENT: CPT

## 2023-11-20 PROCEDURE — 80048 BASIC METABOLIC PNL TOTAL CA: CPT | Performed by: HOSPITALIST

## 2023-11-20 PROCEDURE — 36415 COLL VENOUS BLD VENIPUNCTURE: CPT | Performed by: HOSPITALIST

## 2023-11-20 PROCEDURE — 250N000009 HC RX 250: Performed by: HOSPITALIST

## 2023-11-20 PROCEDURE — 99232 SBSQ HOSP IP/OBS MODERATE 35: CPT | Performed by: HOSPITALIST

## 2023-11-20 PROCEDURE — 83036 HEMOGLOBIN GLYCOSYLATED A1C: CPT | Performed by: HOSPITALIST

## 2023-11-20 PROCEDURE — 999N000157 HC STATISTIC RCP TIME EA 10 MIN

## 2023-11-20 RX ORDER — CEFTRIAXONE 1 G/1
1 INJECTION, POWDER, FOR SOLUTION INTRAMUSCULAR; INTRAVENOUS EVERY 24 HOURS
Qty: 50 ML | Refills: 0 | Status: COMPLETED | OUTPATIENT
Start: 2023-11-20 | End: 2023-11-24

## 2023-11-20 RX ORDER — DEXTROSE MONOHYDRATE 25 G/50ML
25-50 INJECTION, SOLUTION INTRAVENOUS
Status: DISCONTINUED | OUTPATIENT
Start: 2023-11-20 | End: 2023-11-27 | Stop reason: HOSPADM

## 2023-11-20 RX ORDER — ESCITALOPRAM OXALATE 10 MG/1
20 TABLET ORAL DAILY
Status: DISCONTINUED | OUTPATIENT
Start: 2023-11-20 | End: 2023-11-27 | Stop reason: HOSPADM

## 2023-11-20 RX ORDER — NICOTINE POLACRILEX 4 MG
15-30 LOZENGE BUCCAL
Status: DISCONTINUED | OUTPATIENT
Start: 2023-11-20 | End: 2023-11-27 | Stop reason: HOSPADM

## 2023-11-20 RX ORDER — LISINOPRIL 2.5 MG/1
2.5 TABLET ORAL DAILY
Status: DISCONTINUED | OUTPATIENT
Start: 2023-11-20 | End: 2023-11-27 | Stop reason: HOSPADM

## 2023-11-20 RX ORDER — ATORVASTATIN CALCIUM 10 MG/1
20 TABLET, FILM COATED ORAL EVERY EVENING
Status: DISCONTINUED | OUTPATIENT
Start: 2023-11-20 | End: 2023-11-27 | Stop reason: HOSPADM

## 2023-11-20 RX ADMIN — CEFTRIAXONE 1 G: 1 INJECTION, POWDER, FOR SOLUTION INTRAMUSCULAR; INTRAVENOUS at 15:03

## 2023-11-20 RX ADMIN — PREDNISONE 40 MG: 20 TABLET ORAL at 10:55

## 2023-11-20 RX ADMIN — IPRATROPIUM BROMIDE AND ALBUTEROL SULFATE 3 ML: .5; 3 SOLUTION RESPIRATORY (INHALATION) at 16:21

## 2023-11-20 RX ADMIN — INSULIN ASPART 1 UNITS: 100 INJECTION, SOLUTION INTRAVENOUS; SUBCUTANEOUS at 13:15

## 2023-11-20 RX ADMIN — NICOTINE 1 PATCH: 21 PATCH, EXTENDED RELEASE TRANSDERMAL at 10:53

## 2023-11-20 RX ADMIN — ACETYLCYSTEINE 4 ML: 200 SOLUTION ORAL; RESPIRATORY (INHALATION) at 20:16

## 2023-11-20 RX ADMIN — IPRATROPIUM BROMIDE AND ALBUTEROL SULFATE 3 ML: .5; 3 SOLUTION RESPIRATORY (INHALATION) at 08:20

## 2023-11-20 RX ADMIN — ATORVASTATIN CALCIUM 20 MG: 10 TABLET, FILM COATED ORAL at 20:47

## 2023-11-20 RX ADMIN — ACETYLCYSTEINE 4 ML: 200 SOLUTION ORAL; RESPIRATORY (INHALATION) at 16:21

## 2023-11-20 RX ADMIN — ESCITALOPRAM OXALATE 20 MG: 10 TABLET ORAL at 13:14

## 2023-11-20 RX ADMIN — IPRATROPIUM BROMIDE AND ALBUTEROL SULFATE 3 ML: .5; 3 SOLUTION RESPIRATORY (INHALATION) at 20:16

## 2023-11-20 RX ADMIN — ACETYLCYSTEINE 4 ML: 200 SOLUTION ORAL; RESPIRATORY (INHALATION) at 08:20

## 2023-11-20 RX ADMIN — IPRATROPIUM BROMIDE AND ALBUTEROL SULFATE 3 ML: .5; 3 SOLUTION RESPIRATORY (INHALATION) at 12:05

## 2023-11-20 RX ADMIN — INSULIN ASPART 2 UNITS: 100 INJECTION, SOLUTION INTRAVENOUS; SUBCUTANEOUS at 17:34

## 2023-11-20 RX ADMIN — LISINOPRIL 2.5 MG: 2.5 TABLET ORAL at 13:17

## 2023-11-20 RX ADMIN — ACETYLCYSTEINE 4 ML: 200 SOLUTION ORAL; RESPIRATORY (INHALATION) at 12:05

## 2023-11-20 ASSESSMENT — ACTIVITIES OF DAILY LIVING (ADL)
ADLS_ACUITY_SCORE: 39
ADLS_ACUITY_SCORE: 39
ADLS_ACUITY_SCORE: 38
ADLS_ACUITY_SCORE: 38
ADLS_ACUITY_SCORE: 39
ADLS_ACUITY_SCORE: 38
ADLS_ACUITY_SCORE: 37
ADLS_ACUITY_SCORE: 39
ADLS_ACUITY_SCORE: 38

## 2023-11-20 NOTE — UTILIZATION REVIEW
Admission Status; Secondary Review Determination       Under the authority of the Utilization Management Committee, the utilization review process indicated a secondary review on the above patient. The review outcome is based on review of the medical records, discussions with staff, and applying clinical experience noted on the date of the review.     (x) Inpatient Status Appropriate - This patient's medical care is consistent with medical management for inpatient care and reasonable inpatient medical practice.     RATIONALE FOR DETERMINATION     Mr. Mathias is a 64 yo male with a PMH of ongoing tobacco abuse who presents to the ED with progressive SOB and cough.  Presented first to urgent care earlier today with chest imaging that is consistent with bronchial thickening.  Today remain tachypnic, in significant respiratory distress and with ongoing hypoxia requiring supplemental oxygen (earlier today was 87% still on 4L).  He is on scheduled mucomyst, duonebs, steroids and IV abx; requiring further inpatient evaluation, treatment and close clinical monitoring.     At the time of admission with the information available to the attending physician more than 2 nights Hospital complex care was anticipated, based on patient risk of adverse outcome if treated as outpatient and complex care required. Inpatient admission is appropriate based on the Medicare guidelines.     The information on this document is developed by the utilization review team in order for the business office to ensure compliance. This only denotes the appropriateness of proper admission status and does not reflect the quality of care rendered.   The definitions of Inpatient Status and Observation Status used in making the determination above are those provided in the CMS Coverage Manual, Chapter 1 and Chapter 6, section 70.4.         Sincerely,     Ivonne Keating, DO  Utilization Review  Physician Advisor  [facility].

## 2023-11-20 NOTE — PHARMACY-ADMISSION MEDICATION HISTORY
Pharmacist Admission Medication History    Admission medication history is complete. The information provided in this note is only as accurate as the sources available at the time of the update.    Information Source(s): Patient, Patient's pharmacy, and Clinic records via in-person    Pertinent Information: None    Changes made to PTA medication list:  Added: None  Deleted: None  Changed: None    Medication Affordability:       Allergies reviewed with patient and updates made in EHR: yes    Medication History Completed By: Tuan Mayen [facility] 11/19/2023 9:29 PM    PTA Med List   Medication Sig Last Dose    atorvastatin (LIPITOR) 20 MG tablet Take 1 tablet by mouth once daily 11/18/2023 at 2000    escitalopram (LEXAPRO) 20 MG tablet Take 1 tablet by mouth once daily 11/18/2023    glipiZIDE (GLUCOTROL XL) 10 MG 24 hr tablet Take 2 tablets by mouth once daily 11/18/2023 at 0800    lisinopril (ZESTRIL) 2.5 MG tablet Take 1 tablet by mouth once daily 11/18/2023 at 0800    LORazepam (ATIVAN) 0.5 MG tablet Take 1 tablet (0.5 mg) by mouth daily as needed for anxiety 11/18/2023 at 0800    nicotine (NICODERM CQ) 21 MG/24HR 24 hr patch Place 1 patch onto the skin every 24 hours 11/19/2023    TRULICITY 1.5 MG/0.5ML pen INJECT 1.5MG SUBCUTANEOUSLY ONCE A WEEK 11/16/2023

## 2023-11-20 NOTE — PROGRESS NOTES
Care Management Follow Up    Length of Stay (days): 1    Expected Discharge Date: 11/21/2023     Concerns to be Addressed: discharge planning     Patient plan of care discussed at interdisciplinary rounds: Yes    Anticipated Discharge Disposition: Home     Anticipated Discharge Services: None  Anticipated Discharge DME: None    Patient/family educated on Medicare website which has current facility and service quality ratings: no  Education Provided on the Discharge Plan: No  Patient/Family in Agreement with the Plan: no    Referrals Placed by CM/SW:  n/a  Private pay costs discussed: Not applicable    Additional Information:  SW completed chart assessment due to low readmissions score. Pt comes from home with spouse, and appears to be independent at baseline. Anticipate no needs from care management at discharge. Pt to follow with PCP if needs arise post discharge.    CM to continue to follow through hospitalization.  9:50 AM    JUAN Lopez  11/20/2023

## 2023-11-20 NOTE — PLAN OF CARE
Problem: Gas Exchange Impaired  Goal: Optimal Gas Exchange  Outcome: Progressing   Goal Outcome Evaluation:  Pt satting 92-94% on 2L O2 NC. Productive frequent cough when awake. Coughing up clear sputum per pt report. Encouraged to clear secretions. Sleeping intermittently overnight. Alarms on for safety. Call light in reach.

## 2023-11-20 NOTE — PROGRESS NOTES
[facility]    Medicine Progress Note - Hospitalist Service    Date of Admission:  11/19/2023    Assessment & Plan   Remi Mathias is a 63 year old male admitted with hypoxic respiratory failure in the setting of COPD exacerbation.  He remains hypoxic, continue current management.    # Acute respiratory failure with hypoxia  # COPD exacerbation  - scheduled nebs  - prednisone  - CTX    # DM  - ISS            Diet: Moderate Consistent Carb (60 g CHO per Meal) Diet    Rose Catheter: Not present  Lines: None     Cardiac Monitoring: None  Code Status: Full Code      Clinically Significant Risk Factors Present on Admission                  # Hypertension: Noted on problem list                 Disposition Plan      Expected Discharge Date: 11/21/2023      Destination: home              Mikel Vergara MD  Hospitalist Service  [facility]  Securely message with [employer] (more info)  Text page via [employer] Paging/Directory   ______________________________________________________________________    Interval History   Reports breathing is not too bad today.  Denies chest pain/pressure.    Physical Exam   Vital Signs: Temp: 97.8  F (36.6  C) Temp src: Oral BP: 130/69 Pulse: 75   Resp: 16 SpO2: 92 % O2 Device: Nasal cannula Oxygen Delivery: 4 LPM  Weight: 201 lbs 6.4 oz    Gen:  independently ambulatory in room, nad  Neuro: alert, conversant  CV: nl rate, regular rhythm  Pulm: no acute resp distress, faint rhonchi on left, notable wheezing on right to posterior auscultation  GI:  abdomen NTTP    Medical Decision Making             Data   Reviewed:    Na 139  K 4.3  BUN 15  Cr 0.75

## 2023-11-20 NOTE — H&P
"Hospital Medicine Service History and Physical  [facility]: [facility]    Remi Mathias is a 63 year old male who formerly worked as a printer, now works in retail  PMHx: Tobacco abuse, diabetes, HLD  Chief Complaint: Shortness of breath    Hospital Course   11/19/23 - He was directly admitted from urgent care for acute respiratory failure with hypoxia attributed to bronchitis and COPD.  Assessment & Plan   Acute respiratory failure with hypoxia   COPD exacerbation  Acute bronchitis  Continue ceftriaxone.  Scheduled nebulizers, prednisone.  RT consult    Tobacco abuse  21 mg patch as needed, lozenges as needed    Type 2 diabetes  Dyslipidemia  Med rec pending       Estimated body mass index is 33.14 kg/m  as calculated from the following:    Height as of 7/12/23: 1.676 m (5' 6\").    Weight as of 7/12/23: 93.1 kg (205 lb 4.8 oz).  DVTP: Ambulate  Code Status: Full Code  Disposition: Inpatient   Discharge: Pending normal respiratory function    History of Present Illness  Remi Mathias was seen in urgent care for shortness of breath and 1 week of cough.  Reportedly on arrival he was hypoxic in the mid 80s, afebrile, hemodynamically stable.  They reported a negative laboratory work-up which included BMP, lactic acid, troponin, D-dimer, COVID/influenza, and 2 view chest x-ray.  A CT with contrast showed emphysema and bronchial thickening.  EKG revealed normal sinus rhythm with right bundle branch block without prior for comparison.  He was treated with oxygen via nasal cannula, 60 mg prednisone x1, ceftriaxone 2 g x 1, and DuoNebs.  They attempted to ambulate him and he desatted to the mid 70s.  He says his breathing got better with a nebulizer.  Bicarb on BMP was 25.  Reported they do not have the ability to perform ABG/VBG.  He denies history of VTE, MI, CVA, or cancer.  He is a daily pack-a-day smoker.  Denies alcohol or drug use.  Denies recent infection to his knowledge.  Says he does not " follow with a cardiologist or pulmonologist for any reason.  He cannot remember what medications he takes daily but he knows he takes medications.     No results found for this visit on 11/19/23.   Medications   lidocaine 1 % 0.1-1 mL (has no administration in time range)   lidocaine (LMX4) cream (has no administration in time range)   sodium chloride (PF) 0.9% PF flush 3 mL (has no administration in time range)   sodium chloride (PF) 0.9% PF flush 3 mL (has no administration in time range)   senna-docusate (SENOKOT-S/PERICOLACE) 8.6-50 MG per tablet 1 tablet (has no administration in time range)     Or   senna-docusate (SENOKOT-S/PERICOLACE) 8.6-50 MG per tablet 2 tablet (has no administration in time range)   ondansetron (ZOFRAN ODT) ODT tab 4 mg (has no administration in time range)     Or   ondansetron (ZOFRAN) injection 4 mg (has no administration in time range)   calcium carbonate (TUMS) chewable tablet 1,000 mg (has no administration in time range)   benzocaine-menthol (CEPACOL) 15-3.6 MG lozenge 1 lozenge (has no administration in time range)   miconazole (MICATIN) 2 % powder (has no administration in time range)   acetaminophen (TYLENOL) tablet 650 mg (has no administration in time range)     Or   acetaminophen (TYLENOL) Suppository 650 mg (has no administration in time range)   ipratropium - albuterol 0.5 mg/2.5 mg/3 mL (DUONEB) neb solution 3 mL (has no administration in time range)   predniSONE (DELTASONE) tablet 40 mg (has no administration in time range)   cefTRIAXone (ROCEPHIN) 1 g vial to attach to  mL bag for ADULTS or NS 50 mL bag for PEDS (has no administration in time range)   acetylcysteine (MUCOMYST) 20 % nebulizer solution 4 mL (has no administration in time range)   nicotine Patch in Place (has no administration in time range)   nicotine (NICODERM CQ) 21 MG/24HR 24 hr patch 1 patch (has no administration in time range)   nicotine (NICORETTE) lozenge 4 mg (has no administration in time  range)       Physical exam:  Appears mild respiratory distress  Anicteric sclera, pupils symmetric  No obvious JVD, wearing nasal cannula oxygen  Bilateral diminished breath sounds with wheeze, increased work of breathing, conversational dyspnea  Heart rate regular without murmur in the 90s, rhythm regular  Abdomen obese, nontender  No peripheral edema  Moving all 4 extremities  Normal affect, alert, does not speak effusively  Vital signs reviewed by me    Wt Readings from Last 4 Encounters:   07/12/23 93.1 kg (205 lb 4.8 oz)   01/18/23 92.6 kg (204 lb 3.2 oz)   07/20/22 96.6 kg (213 lb)   12/09/21 95.9 kg (211 lb 6 oz)        reports that he has been smoking. He has a 22.50 pack-year smoking history. He has never used smokeless tobacco. He reports that he does not currently use alcohol. He reports that he does not use drugs.  family history includes Diabetes in his sister; Heart Disease in his father; Hypertension in his father; Lung Cancer in his mother.   has a past surgical history that includes LAP,CHOLECYSTECTOMY/EXPLORE and Incision And Drainage Of Wound (Right, 2/26/2020).   Allergies   Allergen Reactions    Metformin Diarrhea     XR and IR both cause diarrhea        Brady Rodrigues MD, MPH  [facility]   Phone: #[phone]

## 2023-11-20 NOTE — PROGRESS NOTES
RCAT done         11/19/23 2123   RCAT Assessment   Reason for Assessment Other (see comments)  (Hypoxia)   Pulmonary Status 1   Surgical Status 0   Chest X-ray 1   Respiratory Pattern 0   Mental Status 0   Breath Sounds 2   Cough Effectiveness 0   Level of Activity 0   O2 Required for SpO2>=92% 1   Acuity Level (points) 5   Acuity Level  5   Re-eval Interval Guideline Every 3 days   Re-evaluation Date 11/21/23

## 2023-11-20 NOTE — PLAN OF CARE
Problem: Gas Exchange Impaired  Goal: Optimal Gas Exchange  Outcome: Not Progressing  Intervention: Optimize Oxygenation and Ventilation  Recent Flowsheet Documentation  Taken 11/20/2023 1037 by Darline Norwood, RN  Head of Bed (HOB) Positioning: HOB at 30-45 degrees  Taken 11/20/2023 0900 by Darline Norwood, RN  Airway/Ventilation Management: calming measures promoted     Problem: Adult Inpatient Plan of Care  Goal: Optimal Comfort and Wellbeing  Outcome: Progressing   Goal Outcome Evaluation:  Pt has expiratory wheezing, coarse, cough with lots of clear secretions, sputum sent. Pt short of breath with ambulation.     Patient has received IV antibiotics.     Pt Iv removed and placed.     Pt standby in the room.

## 2023-11-20 NOTE — PLAN OF CARE
Pt arrived as a direct admission by EMS transport. Report was received after patient arrived on the unit.     Lung sounds are wheezing and frequent coughing. pt is having shortness of breath. On 4 LO2NC. 20g iv in Left AC placed before admission. One assist with belt and walker.       Joseph Hein RN on 11/20/2023 at 10 pm

## 2023-11-20 NOTE — PROGRESS NOTES
Patient received scheduled nebulizers as ordered. Tolerated treatment well. Pt on 4 LPM NC with saturations of 92-94%, Breath sounds diminished t/o. Pt has productive cough. Will continue to follow.     Shyanne Lopez, RT

## 2023-11-21 LAB
GLUCOSE BLDC GLUCOMTR-MCNC: 110 MG/DL (ref 70–99)
GLUCOSE BLDC GLUCOMTR-MCNC: 189 MG/DL (ref 70–99)
GLUCOSE BLDC GLUCOMTR-MCNC: 198 MG/DL (ref 70–99)
GLUCOSE BLDC GLUCOMTR-MCNC: 266 MG/DL (ref 70–99)

## 2023-11-21 PROCEDURE — 94640 AIRWAY INHALATION TREATMENT: CPT | Mod: 76

## 2023-11-21 PROCEDURE — 87205 SMEAR GRAM STAIN: CPT | Performed by: HOSPITALIST

## 2023-11-21 PROCEDURE — 94640 AIRWAY INHALATION TREATMENT: CPT

## 2023-11-21 PROCEDURE — 250N000012 HC RX MED GY IP 250 OP 636 PS 637: Performed by: HOSPITALIST

## 2023-11-21 PROCEDURE — 999N000157 HC STATISTIC RCP TIME EA 10 MIN

## 2023-11-21 PROCEDURE — 250N000011 HC RX IP 250 OP 636: Mod: JZ | Performed by: HOSPITALIST

## 2023-11-21 PROCEDURE — 87070 CULTURE OTHR SPECIMN AEROBIC: CPT | Performed by: HOSPITALIST

## 2023-11-21 PROCEDURE — 250N000013 HC RX MED GY IP 250 OP 250 PS 637: Performed by: HOSPITALIST

## 2023-11-21 PROCEDURE — 250N000009 HC RX 250: Performed by: HOSPITALIST

## 2023-11-21 PROCEDURE — 99232 SBSQ HOSP IP/OBS MODERATE 35: CPT | Performed by: HOSPITALIST

## 2023-11-21 PROCEDURE — 120N000001 HC R&B MED SURG/OB

## 2023-11-21 RX ORDER — ACETYLCYSTEINE 200 MG/ML
4 SOLUTION ORAL; RESPIRATORY (INHALATION) 2 TIMES DAILY
Status: DISCONTINUED | OUTPATIENT
Start: 2023-11-21 | End: 2023-11-27 | Stop reason: HOSPADM

## 2023-11-21 RX ADMIN — ACETYLCYSTEINE 4 ML: 200 SOLUTION ORAL; RESPIRATORY (INHALATION) at 07:48

## 2023-11-21 RX ADMIN — INSULIN ASPART 3 UNITS: 100 INJECTION, SOLUTION INTRAVENOUS; SUBCUTANEOUS at 17:53

## 2023-11-21 RX ADMIN — ACETYLCYSTEINE 4 ML: 200 SOLUTION ORAL; RESPIRATORY (INHALATION) at 20:36

## 2023-11-21 RX ADMIN — IPRATROPIUM BROMIDE AND ALBUTEROL SULFATE 3 ML: .5; 3 SOLUTION RESPIRATORY (INHALATION) at 15:49

## 2023-11-21 RX ADMIN — ATORVASTATIN CALCIUM 20 MG: 10 TABLET, FILM COATED ORAL at 20:02

## 2023-11-21 RX ADMIN — CEFTRIAXONE 1 G: 1 INJECTION, POWDER, FOR SOLUTION INTRAMUSCULAR; INTRAVENOUS at 16:02

## 2023-11-21 RX ADMIN — IPRATROPIUM BROMIDE AND ALBUTEROL SULFATE 3 ML: .5; 3 SOLUTION RESPIRATORY (INHALATION) at 20:35

## 2023-11-21 RX ADMIN — PREDNISONE 40 MG: 20 TABLET ORAL at 08:07

## 2023-11-21 RX ADMIN — IPRATROPIUM BROMIDE AND ALBUTEROL SULFATE 3 ML: .5; 3 SOLUTION RESPIRATORY (INHALATION) at 11:32

## 2023-11-21 RX ADMIN — IPRATROPIUM BROMIDE AND ALBUTEROL SULFATE 3 ML: .5; 3 SOLUTION RESPIRATORY (INHALATION) at 07:44

## 2023-11-21 RX ADMIN — ESCITALOPRAM OXALATE 20 MG: 10 TABLET ORAL at 08:07

## 2023-11-21 ASSESSMENT — ACTIVITIES OF DAILY LIVING (ADL)
ADLS_ACUITY_SCORE: 21
ADLS_ACUITY_SCORE: 21
ADLS_ACUITY_SCORE: 38
ADLS_ACUITY_SCORE: 38
ADLS_ACUITY_SCORE: 21
ADLS_ACUITY_SCORE: 38
ADLS_ACUITY_SCORE: 21
ADLS_ACUITY_SCORE: 38

## 2023-11-21 NOTE — PROGRESS NOTES
[facility]    Medicine Progress Note - Hospitalist Service    Date of Admission:  11/19/2023    Assessment & Plan   Remi Mathias is a 63 year old male admitted with hypoxic respiratory failure in the setting of COPD exacerbation.  He remains hypoxic, continue current management.     # Acute respiratory failure with hypoxia  # COPD exacerbation  - scheduled nebs  - prednisone  - CTX  - goal spo2 >88%     # DM  - ISS          Diet: Moderate Consistent Carb (60 g CHO per Meal) Diet    Rose Catheter: Not present  Lines: None     Cardiac Monitoring: None  Code Status: Full Code      Clinically Significant Risk Factors                  # Hypertension: Noted on problem list       # DMII: A1C = 6.6 % (Ref range: <5.7 %) within past 6 months, PRESENT ON ADMISSION             Disposition Plan      Expected Discharge Date: 11/21/2023      Destination: home              Mikel Vergara MD  Hospitalist Service  [facility]  Securely message with [employer] (more info)  Text page via [employer] Paging/Directory   ______________________________________________________________________    Interval History   Reports mild dyspnea with exertion.  Denies chest pain/pressure.    Physical Exam   Vital Signs: Temp: 98  F (36.7  C) Temp src: Oral BP: 128/74 Pulse: 58   Resp: 18 SpO2: 92 % O2 Device: Nasal cannula Oxygen Delivery: 4 LPM  Weight: 198 lbs 0 oz    Spo2 87% on 1L    Gen:  lying in bed in no acute distres  Neuro: alert, conversant  CV: nl rate, regular rhythm  Pulm: no acute resp distress, faint coarse breath sounds at right base  GI:  abdomen soft, NTTP    Medical Decision Making             Data

## 2023-11-21 NOTE — PLAN OF CARE
Problem: Gas Exchange Impaired  Goal: Optimal Gas Exchange  Outcome: Progressing    Pt alert and oriented x 4. VSS. Lung sounds clear diminished. Denies chest pain and N/V. Has intermittent SOB and BREWER. Given neb treatments. Will continue to monitor and intervene as needed. Up independently in room. Calls appropriately.

## 2023-11-21 NOTE — PLAN OF CARE
Goal Outcome Evaluation:    Patient is alert & oriented. Denies pain. On 4 LPM. Denies pain. Pt reports cough, not able to get sputum sample. Nicotine patch in place. BG was 110. Independent in room, calls appropriately.

## 2023-11-21 NOTE — PLAN OF CARE
"Pt having decreased cough this shift per pt \" I'm starting to feel better\". Pt now on 3 liters of 02 sating between 88-94% per MD the goal is 88% or better. Home O2 assessment ordered this afternoon still needs to be done but pt is eating lunch right now, will attempt after pt is done eating. Nicotine patch on right shoulder. Pt denies pain.   Gely Hardy RN on 11/21/2023 at 1:49 PM   Problem: Adult Inpatient Plan of Care  Goal: Plan of Care Review  Description: The Plan of Care Review/Shift note should be completed every shift.  The Outcome Evaluation is a brief statement about your assessment that the patient is improving, declining, or no change.  This information will be displayed automatically on your shift  note.  Outcome: Progressing  Goal: Patient-Specific Goal (Individualized)  Description: You can add care plan individualizations to a care plan. Examples of Individualization might be:  \"Parent requests to be called daily at 9am for status\", \"I have a hard time hearing out of my right ear\", or \"Do not touch me to wake me up as it startles  me\".  Outcome: Progressing  Goal: Absence of Hospital-Acquired Illness or Injury  Outcome: Progressing  Intervention: Identify and Manage Fall Risk  Recent Flowsheet Documentation  Taken 11/21/2023 0800 by Gely Hardy, RN  Safety Promotion/Fall Prevention:   activity supervised   supervised activity   safety round/check completed   increase visualization of patient   increased rounding and observation  Goal: Optimal Comfort and Wellbeing  Outcome: Progressing  Goal: Readiness for Transition of Care  Outcome: Progressing  Intervention: Mutually Develop Transition Plan  Recent Flowsheet Documentation  Taken 11/21/2023 0700 by Gely Hardy, RN  Patient/Family Anticipated Services at Transition: none  Patient/Family Anticipates Transition to: home  Equipment Currently Used at Home: none     Problem: Adult Inpatient Plan of Care  Goal: Optimal Comfort and " Wellbeing  Outcome: Progressing   Goal Outcome Evaluation:

## 2023-11-21 NOTE — PROGRESS NOTES
/74 (BP Location: Right arm)   Pulse 58   Temp 98  F (36.7  C) (Oral)   Resp 18   Wt 89.8 kg (198 lb)   SpO2 90%   BMI 31.96 kg/m      Pt was on 3 lpm NC when last seen. BS were diminished throughout pre and post neb. Pt had a non productive cough. Pt received all schedule nebs. RT will continue to follow.

## 2023-11-22 ENCOUNTER — APPOINTMENT (OUTPATIENT)
Dept: RADIOLOGY | Facility: CLINIC | Age: 63
DRG: 190 | End: 2023-11-22
Attending: HOSPITALIST
Payer: COMMERCIAL

## 2023-11-22 LAB
BACTERIA SPT CULT: NORMAL
GLUCOSE BLDC GLUCOMTR-MCNC: 109 MG/DL (ref 70–99)
GLUCOSE BLDC GLUCOMTR-MCNC: 114 MG/DL (ref 70–99)
GLUCOSE BLDC GLUCOMTR-MCNC: 160 MG/DL (ref 70–99)
GLUCOSE BLDC GLUCOMTR-MCNC: 188 MG/DL (ref 70–99)
GLUCOSE BLDC GLUCOMTR-MCNC: 260 MG/DL (ref 70–99)
GRAM STAIN RESULT: NORMAL

## 2023-11-22 PROCEDURE — 250N000013 HC RX MED GY IP 250 OP 250 PS 637: Performed by: HOSPITALIST

## 2023-11-22 PROCEDURE — 87205 SMEAR GRAM STAIN: CPT | Performed by: HOSPITALIST

## 2023-11-22 PROCEDURE — 250N000012 HC RX MED GY IP 250 OP 636 PS 637: Performed by: HOSPITALIST

## 2023-11-22 PROCEDURE — 94640 AIRWAY INHALATION TREATMENT: CPT | Mod: 76

## 2023-11-22 PROCEDURE — 250N000011 HC RX IP 250 OP 636: Mod: JZ | Performed by: HOSPITALIST

## 2023-11-22 PROCEDURE — 999N000157 HC STATISTIC RCP TIME EA 10 MIN

## 2023-11-22 PROCEDURE — 94640 AIRWAY INHALATION TREATMENT: CPT

## 2023-11-22 PROCEDURE — 71045 X-RAY EXAM CHEST 1 VIEW: CPT

## 2023-11-22 PROCEDURE — 250N000009 HC RX 250: Performed by: HOSPITALIST

## 2023-11-22 PROCEDURE — 120N000001 HC R&B MED SURG/OB

## 2023-11-22 PROCEDURE — 99232 SBSQ HOSP IP/OBS MODERATE 35: CPT | Performed by: HOSPITALIST

## 2023-11-22 RX ORDER — FUROSEMIDE 10 MG/ML
40 INJECTION INTRAMUSCULAR; INTRAVENOUS ONCE
Status: COMPLETED | OUTPATIENT
Start: 2023-11-22 | End: 2023-11-22

## 2023-11-22 RX ORDER — IPRATROPIUM BROMIDE AND ALBUTEROL SULFATE 2.5; .5 MG/3ML; MG/3ML
3 SOLUTION RESPIRATORY (INHALATION)
Status: DISCONTINUED | OUTPATIENT
Start: 2023-11-22 | End: 2023-11-27 | Stop reason: HOSPADM

## 2023-11-22 RX ORDER — HEPARIN SODIUM 5000 [USP'U]/.5ML
5000 INJECTION, SOLUTION INTRAVENOUS; SUBCUTANEOUS 2 TIMES DAILY
Status: DISCONTINUED | OUTPATIENT
Start: 2023-11-22 | End: 2023-11-23

## 2023-11-22 RX ORDER — GUAIFENESIN 200 MG/10ML
200 LIQUID ORAL EVERY 4 HOURS PRN
Status: DISCONTINUED | OUTPATIENT
Start: 2023-11-22 | End: 2023-11-27 | Stop reason: HOSPADM

## 2023-11-22 RX ADMIN — IPRATROPIUM BROMIDE AND ALBUTEROL SULFATE 3 ML: .5; 3 SOLUTION RESPIRATORY (INHALATION) at 19:48

## 2023-11-22 RX ADMIN — IPRATROPIUM BROMIDE AND ALBUTEROL SULFATE 3 ML: .5; 3 SOLUTION RESPIRATORY (INHALATION) at 14:23

## 2023-11-22 RX ADMIN — INSULIN ASPART 1 UNITS: 100 INJECTION, SOLUTION INTRAVENOUS; SUBCUTANEOUS at 13:23

## 2023-11-22 RX ADMIN — HEPARIN SODIUM 5000 UNITS: 5000 INJECTION, SOLUTION INTRAVENOUS; SUBCUTANEOUS at 21:08

## 2023-11-22 RX ADMIN — IPRATROPIUM BROMIDE AND ALBUTEROL SULFATE 3 ML: .5; 3 SOLUTION RESPIRATORY (INHALATION) at 07:50

## 2023-11-22 RX ADMIN — ACETYLCYSTEINE 4 ML: 200 SOLUTION ORAL; RESPIRATORY (INHALATION) at 19:49

## 2023-11-22 RX ADMIN — PREDNISONE 40 MG: 20 TABLET ORAL at 08:45

## 2023-11-22 RX ADMIN — NICOTINE 1 PATCH: 21 PATCH, EXTENDED RELEASE TRANSDERMAL at 08:45

## 2023-11-22 RX ADMIN — LISINOPRIL 2.5 MG: 2.5 TABLET ORAL at 08:45

## 2023-11-22 RX ADMIN — INSULIN ASPART 3 UNITS: 100 INJECTION, SOLUTION INTRAVENOUS; SUBCUTANEOUS at 17:48

## 2023-11-22 RX ADMIN — ATORVASTATIN CALCIUM 20 MG: 10 TABLET, FILM COATED ORAL at 21:08

## 2023-11-22 RX ADMIN — CEFTRIAXONE 1 G: 1 INJECTION, POWDER, FOR SOLUTION INTRAMUSCULAR; INTRAVENOUS at 13:24

## 2023-11-22 RX ADMIN — FUROSEMIDE 40 MG: 10 INJECTION, SOLUTION INTRAMUSCULAR; INTRAVENOUS at 21:08

## 2023-11-22 RX ADMIN — ESCITALOPRAM OXALATE 20 MG: 10 TABLET ORAL at 08:45

## 2023-11-22 RX ADMIN — ACETYLCYSTEINE 4 ML: 200 SOLUTION ORAL; RESPIRATORY (INHALATION) at 07:50

## 2023-11-22 RX ADMIN — GUAIFENESIN 200 MG: 100 SOLUTION ORAL at 00:37

## 2023-11-22 ASSESSMENT — ACTIVITIES OF DAILY LIVING (ADL)
ADLS_ACUITY_SCORE: 21

## 2023-11-22 NOTE — PLAN OF CARE
Problem: Adult Inpatient Plan of Care  Goal: Absence of Hospital-Acquired Illness or Injury  Intervention: Identify and Manage Fall Risk  Recent Flowsheet Documentation  Taken 11/21/2023 1700 by Arthur Irby RN  Safety Promotion/Fall Prevention:   activity supervised   supervised activity   safety round/check completed   increase visualization of patient   increased rounding and observation   Goal Outcome Evaluation:      Plan of Care Reviewed With: patient    Overall Patient Progress: improvingOverall Patient Progress: improving pt alert and oriented, vss on 3liters of oxygen, can voice needs, SBA to the bathrroom, denies pain.

## 2023-11-22 NOTE — PROVIDER NOTIFICATION
Nurse informed Dr. Hugo Whitten about patient's cough. Nurse requested cough medication. Dr. Whitten placed new orders.     Lab also called and requested a recollection of sputum sample collected due to contamination. Dr. Whitten placed new orders.

## 2023-11-22 NOTE — PROGRESS NOTES
/60 (BP Location: Left arm)   Pulse 74   Temp 97.9  F (36.6  C) (Oral)   Resp 18   Wt 90.6 kg (199 lb 11.8 oz)   SpO2 90%   BMI 32.24 kg/m      Pt was on 5 lpm NC when last seen. BS were diminished throughout the day. Pt had a non productive cough today. Pt received all schedule nebs. RT will continue to follow.

## 2023-11-22 NOTE — PLAN OF CARE
Pt needing 5 liters of oxygen to keep sats above 88%. Pt has productive cough, nebs have been ordered. IV antibiotics given earlier this afternoon. Nicotine patch on left arm. 1 unit of insulin needed for lunch. MD put pulmonary consult in. Discharge pending clinical improvement.  Gely Hardy RN on 11/22/2023 at 2:13 PM   Problem: Adult Inpatient Plan of Care  Goal: Plan of Care Review  Description: The Plan of Care Review/Shift note should be completed every shift.  The Outcome Evaluation is a brief statement about your assessment that the patient is improving, declining, or no change.  This information will be displayed automatically on your shift  note.  Outcome: Progressing     Problem: Gas Exchange Impaired  Goal: Optimal Gas Exchange  Outcome: Not Progressing   Goal Outcome Evaluation:

## 2023-11-22 NOTE — PLAN OF CARE
Goal Outcome Evaluation:  Problem: Adult Inpatient Plan of Care  Goal: Plan of Care Review  Description: The Plan of Care Review/Shift note should be completed every shift.  The Outcome Evaluation is a brief statement about your assessment that the patient is improving, declining, or no change.  This information will be displayed automatically on your shift  note.  Outcome: Progressing  Pt is alert and oriented x4. Pt's vital signs are stable, see flowsheet for details. Pt is on 3 LPM of O2 via NC. Pt is voiding without difficulty and adequately. Pt denies flank and bladder pain. Pt denies pain. CMS intact. Up indp. Respiratory culture collected. Pt has congested cough, PRN robitussin effective. Pt is tolerating diet. Calls appropriately and can make needs known.  Nurse will continue to monitor.

## 2023-11-22 NOTE — PROGRESS NOTES
[facility]    Medicine Progress Note - Hospitalist Service    Date of Admission:  11/19/2023    Assessment & Plan   Remi Mathias  is a 63 year old male admitted with hypoxic respiratory failure in the setting of COPD exacerbation.   Hypoxia stable to worsening.  NO associated fevers or hemodynamic changes.  No clear evidence to suppot an infectious etiology.  No wheezing on exam.  Given etiology is unclear, repeat CXR, check TTE, consult pulmonology.  Sputum cx pending.    # Acute respiratory failure with hypoxia  # COPD exacerbation  - CXR  - pulm consult  - sputum cx pending  - scheduled nebs  - prednisone  - CTX  - goal spo2 >88%     # DM  - ISS    Addendum:  New small pleural effusion noted on CXR.  Unlikely that this can completely account for his overall hypoxia.  However, given associated lower extremity edema, trial furosemide IV x1.          Diet: Moderate Consistent Carb (60 g CHO per Meal) Diet    Rose Catheter: Not present  Lines: None     Cardiac Monitoring: None  Code Status: Full Code      Clinically Significant Risk Factors                  # Hypertension: Noted on problem list       # DMII: A1C = 6.6 % (Ref range: <5.7 %) within past 6 months, PRESENT ON ADMISSION             Disposition Plan      Expected Discharge Date: 11/23/2023      Destination: home              Mikel Vergara MD  Hospitalist Service  [facility]  Securely message with [employer] (more info)  Text page via [employer] Paging/Directory   ______________________________________________________________________    Interval History   Denies dyspnea at rest, chest pain, or chest pressure.    Physical Exam   Vital Signs: Temp: 97.9  F (36.6  C) Temp src: Oral BP: 106/60 Pulse: 74   Resp: 18 SpO2: 90 % O2 Device: Nasal cannula with humidification Oxygen Delivery: 5 LPM  Weight: 199 lbs 11.79 oz    Gen: sitting in bed in no acute distress  Neuro: alert, conversant  CV:  nl rate, regular  rhythm  Pulm: no acute resp distress, mildly decreased breath sounds, CTAB  Ext:  trace lower extremity edema    Medical Decision Making             Data

## 2023-11-23 ENCOUNTER — APPOINTMENT (OUTPATIENT)
Dept: CARDIOLOGY | Facility: CLINIC | Age: 63
DRG: 190 | End: 2023-11-23
Attending: HOSPITALIST
Payer: COMMERCIAL

## 2023-11-23 LAB
ANION GAP SERPL CALCULATED.3IONS-SCNC: 11 MMOL/L (ref 7–15)
BUN SERPL-MCNC: 18.8 MG/DL (ref 8–23)
CALCIUM SERPL-MCNC: 8.6 MG/DL (ref 8.8–10.2)
CHLORIDE SERPL-SCNC: 102 MMOL/L (ref 98–107)
CREAT SERPL-MCNC: 0.96 MG/DL (ref 0.67–1.17)
DEPRECATED HCO3 PLAS-SCNC: 27 MMOL/L (ref 22–29)
EGFRCR SERPLBLD CKD-EPI 2021: 88 ML/MIN/1.73M2
ERYTHROCYTE [DISTWIDTH] IN BLOOD BY AUTOMATED COUNT: 13.5 % (ref 10–15)
GLUCOSE BLDC GLUCOMTR-MCNC: 127 MG/DL (ref 70–99)
GLUCOSE BLDC GLUCOMTR-MCNC: 170 MG/DL (ref 70–99)
GLUCOSE BLDC GLUCOMTR-MCNC: 173 MG/DL (ref 70–99)
GLUCOSE BLDC GLUCOMTR-MCNC: 242 MG/DL (ref 70–99)
GLUCOSE SERPL-MCNC: 115 MG/DL (ref 70–99)
HCT VFR BLD AUTO: 47.9 % (ref 40–53)
HGB BLD-MCNC: 16 G/DL (ref 13.3–17.7)
LVEF ECHO: NORMAL
MAGNESIUM SERPL-MCNC: 2 MG/DL (ref 1.7–2.3)
MCH RBC QN AUTO: 31.8 PG (ref 26.5–33)
MCHC RBC AUTO-ENTMCNC: 33.4 G/DL (ref 31.5–36.5)
MCV RBC AUTO: 95 FL (ref 78–100)
PLATELET # BLD AUTO: 146 10E3/UL (ref 150–450)
POTASSIUM SERPL-SCNC: 3.6 MMOL/L (ref 3.4–5.3)
RBC # BLD AUTO: 5.03 10E6/UL (ref 4.4–5.9)
SODIUM SERPL-SCNC: 140 MMOL/L (ref 135–145)
WBC # BLD AUTO: 10.8 10E3/UL (ref 4–11)

## 2023-11-23 PROCEDURE — 94640 AIRWAY INHALATION TREATMENT: CPT

## 2023-11-23 PROCEDURE — 93010 ELECTROCARDIOGRAM REPORT: CPT | Performed by: INTERNAL MEDICINE

## 2023-11-23 PROCEDURE — 83735 ASSAY OF MAGNESIUM: CPT | Performed by: STUDENT IN AN ORGANIZED HEALTH CARE EDUCATION/TRAINING PROGRAM

## 2023-11-23 PROCEDURE — 99232 SBSQ HOSP IP/OBS MODERATE 35: CPT | Performed by: STUDENT IN AN ORGANIZED HEALTH CARE EDUCATION/TRAINING PROGRAM

## 2023-11-23 PROCEDURE — 94640 AIRWAY INHALATION TREATMENT: CPT | Mod: 76

## 2023-11-23 PROCEDURE — 93005 ELECTROCARDIOGRAM TRACING: CPT | Performed by: STUDENT IN AN ORGANIZED HEALTH CARE EDUCATION/TRAINING PROGRAM

## 2023-11-23 PROCEDURE — 120N000001 HC R&B MED SURG/OB

## 2023-11-23 PROCEDURE — 250N000013 HC RX MED GY IP 250 OP 250 PS 637: Performed by: STUDENT IN AN ORGANIZED HEALTH CARE EDUCATION/TRAINING PROGRAM

## 2023-11-23 PROCEDURE — 250N000009 HC RX 250: Performed by: HOSPITALIST

## 2023-11-23 PROCEDURE — 250N000013 HC RX MED GY IP 250 OP 250 PS 637: Performed by: HOSPITALIST

## 2023-11-23 PROCEDURE — 93005 ELECTROCARDIOGRAM TRACING: CPT

## 2023-11-23 PROCEDURE — 999N000157 HC STATISTIC RCP TIME EA 10 MIN

## 2023-11-23 PROCEDURE — 80048 BASIC METABOLIC PNL TOTAL CA: CPT | Performed by: HOSPITALIST

## 2023-11-23 PROCEDURE — 250N000011 HC RX IP 250 OP 636: Mod: JZ | Performed by: STUDENT IN AN ORGANIZED HEALTH CARE EDUCATION/TRAINING PROGRAM

## 2023-11-23 PROCEDURE — 36415 COLL VENOUS BLD VENIPUNCTURE: CPT | Performed by: HOSPITALIST

## 2023-11-23 PROCEDURE — 85027 COMPLETE CBC AUTOMATED: CPT | Performed by: HOSPITALIST

## 2023-11-23 PROCEDURE — 93306 TTE W/DOPPLER COMPLETE: CPT

## 2023-11-23 PROCEDURE — 250N000011 HC RX IP 250 OP 636: Mod: JZ | Performed by: HOSPITALIST

## 2023-11-23 PROCEDURE — 250N000012 HC RX MED GY IP 250 OP 636 PS 637: Performed by: HOSPITALIST

## 2023-11-23 PROCEDURE — 93306 TTE W/DOPPLER COMPLETE: CPT | Mod: 26 | Performed by: INTERNAL MEDICINE

## 2023-11-23 RX ORDER — ENOXAPARIN SODIUM 100 MG/ML
40 INJECTION SUBCUTANEOUS AT BEDTIME
Status: DISCONTINUED | OUTPATIENT
Start: 2023-11-23 | End: 2023-11-27 | Stop reason: HOSPADM

## 2023-11-23 RX ORDER — FUROSEMIDE 10 MG/ML
20 INJECTION INTRAMUSCULAR; INTRAVENOUS EVERY 6 HOURS
Status: DISCONTINUED | OUTPATIENT
Start: 2023-11-23 | End: 2023-11-23

## 2023-11-23 RX ORDER — FUROSEMIDE 10 MG/ML
20 INJECTION INTRAMUSCULAR; INTRAVENOUS ONCE
Status: COMPLETED | OUTPATIENT
Start: 2023-11-23 | End: 2023-11-23

## 2023-11-23 RX ORDER — POTASSIUM CHLORIDE 1500 MG/1
40 TABLET, EXTENDED RELEASE ORAL ONCE
Status: COMPLETED | OUTPATIENT
Start: 2023-11-23 | End: 2023-11-23

## 2023-11-23 RX ORDER — AZITHROMYCIN 500 MG/1
500 TABLET, FILM COATED ORAL DAILY
Status: COMPLETED | OUTPATIENT
Start: 2023-11-23 | End: 2023-11-25

## 2023-11-23 RX ADMIN — ACETYLCYSTEINE 4 ML: 200 SOLUTION ORAL; RESPIRATORY (INHALATION) at 07:38

## 2023-11-23 RX ADMIN — IPRATROPIUM BROMIDE AND ALBUTEROL SULFATE 3 ML: .5; 3 SOLUTION RESPIRATORY (INHALATION) at 13:20

## 2023-11-23 RX ADMIN — POTASSIUM CHLORIDE 40 MEQ: 1500 TABLET, EXTENDED RELEASE ORAL at 13:00

## 2023-11-23 RX ADMIN — NICOTINE 1 PATCH: 21 PATCH, EXTENDED RELEASE TRANSDERMAL at 08:40

## 2023-11-23 RX ADMIN — LISINOPRIL 2.5 MG: 2.5 TABLET ORAL at 08:38

## 2023-11-23 RX ADMIN — HEPARIN SODIUM 5000 UNITS: 5000 INJECTION, SOLUTION INTRAVENOUS; SUBCUTANEOUS at 08:37

## 2023-11-23 RX ADMIN — ACETYLCYSTEINE 4 ML: 200 SOLUTION ORAL; RESPIRATORY (INHALATION) at 20:37

## 2023-11-23 RX ADMIN — AZITHROMYCIN 500 MG: 500 TABLET, FILM COATED ORAL at 13:00

## 2023-11-23 RX ADMIN — FUROSEMIDE 20 MG: 10 INJECTION, SOLUTION INTRAMUSCULAR; INTRAVENOUS at 20:15

## 2023-11-23 RX ADMIN — IPRATROPIUM BROMIDE AND ALBUTEROL SULFATE 3 ML: .5; 3 SOLUTION RESPIRATORY (INHALATION) at 07:37

## 2023-11-23 RX ADMIN — CEFTRIAXONE 1 G: 1 INJECTION, POWDER, FOR SOLUTION INTRAMUSCULAR; INTRAVENOUS at 13:06

## 2023-11-23 RX ADMIN — ATORVASTATIN CALCIUM 20 MG: 10 TABLET, FILM COATED ORAL at 20:16

## 2023-11-23 RX ADMIN — INSULIN ASPART 1 UNITS: 100 INJECTION, SOLUTION INTRAVENOUS; SUBCUTANEOUS at 18:32

## 2023-11-23 RX ADMIN — ESCITALOPRAM OXALATE 20 MG: 10 TABLET ORAL at 08:37

## 2023-11-23 RX ADMIN — FUROSEMIDE 20 MG: 10 INJECTION, SOLUTION INTRAMUSCULAR; INTRAVENOUS at 13:00

## 2023-11-23 RX ADMIN — INSULIN ASPART 1 UNITS: 100 INJECTION, SOLUTION INTRAVENOUS; SUBCUTANEOUS at 12:59

## 2023-11-23 RX ADMIN — IPRATROPIUM BROMIDE AND ALBUTEROL SULFATE 3 ML: .5; 3 SOLUTION RESPIRATORY (INHALATION) at 20:37

## 2023-11-23 RX ADMIN — ENOXAPARIN SODIUM 40 MG: 100 INJECTION SUBCUTANEOUS at 20:16

## 2023-11-23 RX ADMIN — PREDNISONE 40 MG: 20 TABLET ORAL at 08:37

## 2023-11-23 ASSESSMENT — ACTIVITIES OF DAILY LIVING (ADL)
ADLS_ACUITY_SCORE: 21

## 2023-11-23 NOTE — PLAN OF CARE
Goal Outcome Evaluation:      Plan of Care Reviewed With: patient    Overall Patient Progress: no changeOverall Patient Progress: no change    Alert and oriented x4, able to make needs known, denied pain and SOB/BREWER, infrequent cough, continued to be on 5 L of O2 via NC, O2 saturation in low 90s, unable to wean, up ad zara, PIV SL, nicotine patch on left upper arm, plan is pulmonary consult.

## 2023-11-23 NOTE — PROGRESS NOTES
Met pt on 5L NC with sat in the low 90's. Neb treatment given, pt tolerated well. BS diminished pre/post neb.   RT's will continue to follow pt plan of care.    Karolina Salmeron, RT

## 2023-11-23 NOTE — PLAN OF CARE
Problem: Adult Inpatient Plan of Care  Goal: Absence of Hospital-Acquired Illness or Injury  Intervention: Identify and Manage Fall Risk  Recent Flowsheet Documentation  Taken 11/22/2023 1630 by Arthur Irby RN  Safety Promotion/Fall Prevention:   activity supervised   supervised activity   safety round/check completed   increase visualization of patient   increased rounding and observation   assistive device/personal items within reach   nonskid shoes/slippers when out of bed   mobility aid in reach   Goal Outcome Evaluation:      Plan of Care Reviewed With: patient    Overall Patient Progress: improvingOverall Patient Progress: improving pt remains alert and oriented, vss on 5 liters of oxygen, denies pain, independent in the room, had a chest xray due to increased O2 demand, continues on nebs and stereoids.

## 2023-11-23 NOTE — PROGRESS NOTES
[facility] MEDICINE  PROGRESS NOTE       Securely message me with Kartik (more info)    Code Status: Full Code       Remi Mathias  is a 63 year old male admitted with hypoxic respiratory failure in the setting of COPD exacerbation, possible heart failure +/- viral illness.   Hypoxia stable to worsening.  Given etiology is unclear.  Stable on 5 L on the 23rd.     # Acute respiratory failure with hypoxia  # COPD exacerbation  #Acute bronchitis  #Fluid overload?  #Viral illness?  Dyspeic, no orthopnea.  Denies aspiration .  Does not use alcohol .  Never had heart attack or catheterization . Mild dry cough , no chest pain. NO associated fevers or hemodynamic changes.   Said he lost his taste a few days ago but now he has it again.  Denies history of sleep apnea.  No wheezing on exam.   - CT chest on admission with ild bronchial wall thickening and scattered mucus plugging and emphysema background.  No PE was seen.  - CXR on 22nd suggest New opacity at right lung base suggests a small right pleural effusion. There is also question minimal blunting at left costophrenic angle.   - Echo with normal EF and normal IVC.  There is mild hypertrophy.  No improvement with net negative balance, consider to stop IV Lasix.  Lost 3 kg after first dose.  - Strict I's and O's, daily weights.   -Continue to treat as COPD exacerbation, new IV Lasix today, added azithromycin to treat help with COPD exacerbation inflammation  - sputum cx pending  - scheduled nebs  -Incentive spirometry  - RVP  -Consider designated CT for PE evaluation  -Monitor BMP    # DM  - ISS    Anticoagulation   Enoxaparin (Lovenox) SQ    Therapy: PT, OT  Rose:Not present  Lines: None       Current Diet  Orders Placed This Encounter      Moderate Consistent Carb (60 g CHO per Meal) Diet          Barriers to Discharge: Acute hypoxic respiratory failure not significantly improved    Disposition: Patient    Clinically  Significant Risk Factors                  # Hypertension: Noted on problem list       # DMII: A1C = 6.6 % (Ref range: <5.7 %) within past 6 months, PRESENT ON ADMISSION             Interval History/Subjective:  Patient states feeling okay .  Reporting dry cough , dyspnea controlled with oxygen , denies chest pain or pressure .  Denies orthopnea .  Reports he had taste loss in the past few days but now has gained it back .questions answered to verbalized satisfaction.        Physical Exam/Objective:  Temp:  [97.8  F (36.6  C)-97.9  F (36.6  C)] 97.8  F (36.6  C)  Pulse:  [60-84] 60  Resp:  [18] 18  BP: (100-119)/(59-72) 119/72  SpO2:  [89 %-92 %] 89 %  Wt Readings from Last 4 Encounters:   11/23/23 87.9 kg (193 lb 11.2 oz)   07/12/23 93.1 kg (205 lb 4.8 oz)   01/18/23 92.6 kg (204 lb 3.2 oz)   07/20/22 96.6 kg (213 lb)     Body mass index is 31.26 kg/m .    General Appearance: Alert and wake, not in distress  Respiratory: clear lungs, no crackles or wheezing  Cardiovascular: rhythmic, normal S1 and S2, no murmur  GI: soft, non-tender, normal bowel sound  Neurology: oriented x 3  Psych: cooperative and calm, normal affect    Medications:   Personally Reviewed.  Medications      acetylcysteine  4 mL Nebulization BID    atorvastatin  20 mg Oral QPM    azithromycin  500 mg Oral Daily    cefTRIAXone  1 g Intravenous Q24H    escitalopram  20 mg Oral Daily    furosemide  20 mg Intravenous Q6H    heparin ANTICOAGULANT  5,000 Units Subcutaneous BID    insulin aspart  1-7 Units Subcutaneous TID AC    insulin aspart  1-5 Units Subcutaneous At Bedtime    ipratropium - albuterol 0.5 mg/2.5 mg/3 mL  3 mL Nebulization 3 times daily    lisinopril  2.5 mg Oral Daily    nicotine  1 patch Transdermal Daily    nicotine   Transdermal Q8H    predniSONE  40 mg Oral Daily    sodium chloride (PF)  3 mL Intracatheter Q8H       Data reviewed today: I personally reviewed all new medications, labs, imaging/diagnostics reports over the past 24  hours. Pertinent findings include:    Imaging:   Recent Results (from the past 24 hour(s))   XR Chest Port 1 View    Narrative    EXAM: XR CHEST PORT 1 VIEW  LOCATION: [facility]  DATE: 2023    INDICATION: persistent worsening hypoxia in setting of copd  COMPARISON: 2023      Impression    IMPRESSION: New opacity at right lung base suggests a small right pleural effusion. There is also question minimal blunting at left costophrenic angle. Lungs otherwise clear. Heart size normal.   Echocardiogram Complete   Result Value    LVEF  55-60%    Narrative    [id number]  [id number]  [id number]  [id number]^[place]IDA[address] [address]     Name: KARLA GRIMES  MRN: [id number]  : 1960  Study Date: 2023 12:02 PM  Age: 63 yrs  Gender: Male  Patient Location: [facility]  Reason For Study: Respiratory Failure  Ordering Physician: ANGEL PARRA  Referring Physician: SYSTEM, PROVIDER NOT IN  Performed By: BAILEY     BSA: 2.0 m2  Height: 66 in  Weight: 194 lb  HR: 62  BP: 105/59 mmHg  ______________________________________________________________________________  Procedure  Complete Portable Echo Adult.  ______________________________________________________________________________  Interpretation Summary     Normal left ventricular size. Mild concentric left ventricular hypertrophy.  Left ventricular systolic function is normal. The left ventricular ejection  fraction is estimated at 55 to 60%. No regional wall motion abnormality  noted.Diastolic Doppler findings (E/E' ratio and/or other parameters) suggest  left ventricular filling pressures are normal.  Mild right ventricular enlargement. Normal right ventricular systolic  function.  Mild right atrial enlargement.  No hemodynamically significant valve abnormality.  The aortic root measures mildly enlarged at 4.0 cm.  IVC diameter <2.1 cm collapsing >50% with sniff suggests a normal RA  pressure  of 3 mmHg.  ______________________________________________________________________________  I      WMSI = 1.00     % Normal = 100     X - Cannot   0 -                      (2) - Mildly 2 -          Segments  Size  Interpret    Hyperkinetic 1 - Normal  Hypokinetic  Hypokinetic  1-2     small                                                     7 -          3-5      moderate  3 - Akinetic 4 -          5 -         6 - Akinetic Dyskinetic   6-14    large               Dyskinetic   Aneurysmal  w/scar       w/scar       15-16   diffuse     Left Ventricle  The left ventricle is normal in size. There is mild concentric left  ventricular hypertrophy. Diastolic Doppler findings (E/E' ratio and/or other  parameters) suggest left ventricular filling pressures are normal. The visual  ejection fraction is 55-60%. No regional wall motion abnormalities noted.     Right Ventricle  The right ventricle is mildly dilated. The right ventricular systolic function  is normal.     Atria  Normal left atrial size. The right atrium is mildly dilated.     Mitral Valve  Mitral valve leaflets appear normal. There is trace mitral regurgitation.     Tricuspid Valve  The tricuspid valve is not well visualized, but is grossly normal. There is  trace tricuspid regurgitation.     Aortic Valve  The aortic valve is trileaflet. No hemodynamically significant valvular aortic  stenosis.     Pulmonic Valve  The pulmonic valve is not well seen, but is grossly normal. There is trace  pulmonic valvular regurgitation.     Vessels  Aortic root measures mildly enlarged at 4.0 cm. The proximal ascending aorta  measures borderline enlarged at 3.7 cm. IVC diameter <2.1 cm collapsing >50%  with sniff suggests a normal RA pressure of 3 mmHg.     Pericardium  There is no pericardial effusion.     ______________________________________________________________________________  MMode/2D Measurements & Calculations  IVSd: 1.2 cm  LVIDd: 4.9 cm  LVIDs: 3.2  cm  LVPWd: 1.2 cm  FS: 34.6 %  LV mass(C)d: 237.4 grams  LV mass(C)dI: 120.3 grams/m2  Ao root diam: 4.0 cm  LA dimension: 4.3 cm  asc Aorta Diam: 3.7 cm  LA/Ao: 1.1  LVOT diam: 2.0 cm  LVOT area: 3.1 cm2     Ao root diam index Ht(cm/m): 2.4  Ao root diam index BSA (cm/m2): 2.0  Asc Ao diam index BSA (cm/m2): 1.9  Asc Ao diam index Ht(cm/m): 2.2  LA Volume Indexed (AL/bp): 21.5 ml/m2  RV Base: 3.8 cm  RWT: 0.50  TAPSE: 2.5 cm     Time Measurements  Aortic HR: 68.0 BPM  MM HR: 66.0 BPM     Doppler Measurements & Calculations  MV E max roger: 62.9 cm/sec  MV A max roger: 68.7 cm/sec  MV E/A: 0.92  MV dec slope: 244.0 cm/sec2  MV dec time: 0.26 sec  Ao V2 max: 149.0 cm/sec  Ao max P.0 mmHg  Ao V2 mean: 101.0 cm/sec  Ao mean P.0 mmHg  Ao V2 VTI: 29.7 cm  SHARON(I,D): 2.3 cm2  SHARON(V,D): 2.2 cm2  LV V1 max P.5 mmHg  LV V1 max: 106.0 cm/sec  LV V1 VTI: 22.0 cm  CO(LVOT): 4.7 l/min  CI(LVOT): 2.4 l/min/m2  SV(LVOT): 69.1 ml  SI(LVOT): 35.0 ml/m2  PA acc time: 0.14 sec  AV Roger Ratio (DI): 0.71  SHARON Index (cm2/m2): 1.2  E/E': 7.9  E/E' av.2  Lateral E/e': 6.6  Medial E/e': 9.8     Peak E' Roger: 8.0 cm/sec  RV S Roger: 14.7 cm/sec     ______________________________________________________________________________  Report approved by: King Black 2023 01:06 PM             Labs:  Echocardiogram Complete   Final Result      XR Chest Port 1 View   Final Result   IMPRESSION: New opacity at right lung base suggests a small right pleural effusion. There is also question minimal blunting at left costophrenic angle. Lungs otherwise clear. Heart size normal.        Recent Results (from the past 24 hour(s))   Glucose by meter    Collection Time: 23  5:03 PM   Result Value Ref Range    GLUCOSE BY METER POCT 260 (H) 70 - 99 mg/dL   Glucose by meter    Collection Time: 23  8:59 PM   Result Value Ref Range    GLUCOSE BY METER POCT 188 (H) 70 - 99 mg/dL   Glucose by meter    Collection Time: 23  6:33 AM    Result Value Ref Range    GLUCOSE BY METER POCT 127 (H) 70 - 99 mg/dL   CBC with platelets    Collection Time: 11/23/23  7:40 AM   Result Value Ref Range    WBC Count 10.8 4.0 - 11.0 10e3/uL    RBC Count 5.03 4.40 - 5.90 10e6/uL    Hemoglobin 16.0 13.3 - 17.7 g/dL    Hematocrit 47.9 40.0 - 53.0 %    MCV 95 78 - 100 fL    MCH 31.8 26.5 - 33.0 pg    MCHC 33.4 31.5 - 36.5 g/dL    RDW 13.5 10.0 - 15.0 %    Platelet Count 146 (L) 150 - 450 10e3/uL   Basic metabolic panel    Collection Time: 11/23/23  7:40 AM   Result Value Ref Range    Sodium 140 135 - 145 mmol/L    Potassium 3.6 3.4 - 5.3 mmol/L    Chloride 102 98 - 107 mmol/L    Carbon Dioxide (CO2) 27 22 - 29 mmol/L    Anion Gap 11 7 - 15 mmol/L    Urea Nitrogen 18.8 8.0 - 23.0 mg/dL    Creatinine 0.96 0.67 - 1.17 mg/dL    GFR Estimate 88 >60 mL/min/1.73m2    Calcium 8.6 (L) 8.8 - 10.2 mg/dL    Glucose 115 (H) 70 - 99 mg/dL   Magnesium    Collection Time: 11/23/23  7:40 AM   Result Value Ref Range    Magnesium 2.0 1.7 - 2.3 mg/dL   ECG 12-LEAD WITH MUSE (LHE)    Collection Time: 11/23/23 11:56 AM   Result Value Ref Range    Systolic Blood Pressure  mmHg    Diastolic Blood Pressure  mmHg    Ventricular Rate 62 BPM    Atrial Rate 62 BPM    NH Interval 134 ms    QRS Duration 124 ms     ms    QTc 428 ms    P Axis 36 degrees    R AXIS 270 degrees    T Axis 25 degrees    Interpretation ECG       Sinus rhythm  Right bundle branch block  Abnormal ECG  When compared with ECG of 10-APR-2004 13:25,  Right bundle branch block is now Present     Echocardiogram Complete    Collection Time: 11/23/23 12:35 PM   Result Value Ref Range    LVEF  55-60%    Glucose by meter    Collection Time: 11/23/23 12:46 PM   Result Value Ref Range    GLUCOSE BY METER POCT 170 (H) 70 - 99 mg/dL       Pending Labs:  Unresulted Labs Ordered in the Past 30 Days of this Admission       Date and Time Order Name Status Description    11/22/2023 12:35 AM Respiratory Aerobic Bacterial Culture with  Gram Stain Preliminary               SONY VARGAS MD  [facility] Medicine  [facility]  Phone: #[phone]    Securely message me with Nova Ratioraquel (more info)

## 2023-11-23 NOTE — PLAN OF CARE
Problem: Adult Inpatient Plan of Care  Goal: Optimal Comfort and Wellbeing  Outcome: Progressing   Goal Outcome Evaluation:       Pt alert and oriented. Denies pain. Denies SOB. VSS, cont on 5L O2 NC. Infrequent cough. Pt able to make needs known. Up ad zara.

## 2023-11-24 ENCOUNTER — APPOINTMENT (OUTPATIENT)
Dept: RADIOLOGY | Facility: CLINIC | Age: 63
DRG: 190 | End: 2023-11-24
Attending: STUDENT IN AN ORGANIZED HEALTH CARE EDUCATION/TRAINING PROGRAM
Payer: COMMERCIAL

## 2023-11-24 LAB
ANION GAP SERPL CALCULATED.3IONS-SCNC: 10 MMOL/L (ref 7–15)
ATRIAL RATE - MUSE: 62 BPM
BUN SERPL-MCNC: 22.2 MG/DL (ref 8–23)
C PNEUM DNA SPEC QL NAA+PROBE: ABNORMAL
C PNEUM DNA SPEC QL NAA+PROBE: NOT DETECTED
C PNEUM DNA SPEC QL NAA+PROBE: NOT DETECTED
CALCIUM SERPL-MCNC: 9 MG/DL (ref 8.8–10.2)
CHLORIDE SERPL-SCNC: 102 MMOL/L (ref 98–107)
CREAT SERPL-MCNC: 1.01 MG/DL (ref 0.67–1.17)
DEPRECATED HCO3 PLAS-SCNC: 28 MMOL/L (ref 22–29)
DIASTOLIC BLOOD PRESSURE - MUSE: NORMAL MMHG
EGFRCR SERPLBLD CKD-EPI 2021: 83 ML/MIN/1.73M2
ERYTHROCYTE [DISTWIDTH] IN BLOOD BY AUTOMATED COUNT: 13.5 % (ref 10–15)
FLUAV H1 2009 PAND RNA SPEC QL NAA+PROBE: ABNORMAL
FLUAV H1 2009 PAND RNA SPEC QL NAA+PROBE: NOT DETECTED
FLUAV H1 2009 PAND RNA SPEC QL NAA+PROBE: NOT DETECTED
FLUAV H1 RNA SPEC QL NAA+PROBE: ABNORMAL
FLUAV H1 RNA SPEC QL NAA+PROBE: NOT DETECTED
FLUAV H1 RNA SPEC QL NAA+PROBE: NOT DETECTED
FLUAV H3 RNA SPEC QL NAA+PROBE: ABNORMAL
FLUAV H3 RNA SPEC QL NAA+PROBE: NOT DETECTED
FLUAV H3 RNA SPEC QL NAA+PROBE: NOT DETECTED
FLUAV RNA SPEC QL NAA+PROBE: ABNORMAL
FLUAV RNA SPEC QL NAA+PROBE: NOT DETECTED
FLUAV RNA SPEC QL NAA+PROBE: NOT DETECTED
FLUBV RNA SPEC QL NAA+PROBE: ABNORMAL
FLUBV RNA SPEC QL NAA+PROBE: NOT DETECTED
FLUBV RNA SPEC QL NAA+PROBE: NOT DETECTED
GLUCOSE BLDC GLUCOMTR-MCNC: 162 MG/DL (ref 70–99)
GLUCOSE BLDC GLUCOMTR-MCNC: 191 MG/DL (ref 70–99)
GLUCOSE BLDC GLUCOMTR-MCNC: 217 MG/DL (ref 70–99)
GLUCOSE BLDC GLUCOMTR-MCNC: 250 MG/DL (ref 70–99)
GLUCOSE SERPL-MCNC: 109 MG/DL (ref 70–99)
HADV DNA SPEC QL NAA+PROBE: ABNORMAL
HADV DNA SPEC QL NAA+PROBE: NOT DETECTED
HADV DNA SPEC QL NAA+PROBE: NOT DETECTED
HCOV PNL SPEC NAA+PROBE: ABNORMAL
HCOV PNL SPEC NAA+PROBE: NOT DETECTED
HCOV PNL SPEC NAA+PROBE: NOT DETECTED
HCT VFR BLD AUTO: 48.9 % (ref 40–53)
HGB BLD-MCNC: 16.1 G/DL (ref 13.3–17.7)
HMPV RNA SPEC QL NAA+PROBE: ABNORMAL
HMPV RNA SPEC QL NAA+PROBE: NOT DETECTED
HMPV RNA SPEC QL NAA+PROBE: NOT DETECTED
HPIV1 RNA SPEC QL NAA+PROBE: ABNORMAL
HPIV1 RNA SPEC QL NAA+PROBE: NOT DETECTED
HPIV1 RNA SPEC QL NAA+PROBE: NOT DETECTED
HPIV2 RNA SPEC QL NAA+PROBE: ABNORMAL
HPIV2 RNA SPEC QL NAA+PROBE: NOT DETECTED
HPIV2 RNA SPEC QL NAA+PROBE: NOT DETECTED
HPIV3 RNA SPEC QL NAA+PROBE: ABNORMAL
HPIV3 RNA SPEC QL NAA+PROBE: NOT DETECTED
HPIV3 RNA SPEC QL NAA+PROBE: NOT DETECTED
HPIV4 RNA SPEC QL NAA+PROBE: ABNORMAL
HPIV4 RNA SPEC QL NAA+PROBE: NOT DETECTED
HPIV4 RNA SPEC QL NAA+PROBE: NOT DETECTED
INTERPRETATION ECG - MUSE: NORMAL
M PNEUMO DNA SPEC QL NAA+PROBE: ABNORMAL
M PNEUMO DNA SPEC QL NAA+PROBE: NOT DETECTED
M PNEUMO DNA SPEC QL NAA+PROBE: NOT DETECTED
MAGNESIUM SERPL-MCNC: 2.3 MG/DL (ref 1.7–2.3)
MCH RBC QN AUTO: 31.3 PG (ref 26.5–33)
MCHC RBC AUTO-ENTMCNC: 32.9 G/DL (ref 31.5–36.5)
MCV RBC AUTO: 95 FL (ref 78–100)
NT-PROBNP SERPL-MCNC: 52 PG/ML (ref 0–900)
P AXIS - MUSE: 36 DEGREES
PLATELET # BLD AUTO: 158 10E3/UL (ref 150–450)
POTASSIUM SERPL-SCNC: 3.7 MMOL/L (ref 3.4–5.3)
PR INTERVAL - MUSE: 134 MS
QRS DURATION - MUSE: 124 MS
QT - MUSE: 422 MS
QTC - MUSE: 428 MS
R AXIS - MUSE: 270 DEGREES
RBC # BLD AUTO: 5.14 10E6/UL (ref 4.4–5.9)
RSV RNA SPEC QL NAA+PROBE: ABNORMAL
RSV RNA SPEC QL NAA+PROBE: ABNORMAL
RSV RNA SPEC QL NAA+PROBE: NOT DETECTED
RV+EV RNA SPEC QL NAA+PROBE: ABNORMAL
RV+EV RNA SPEC QL NAA+PROBE: NOT DETECTED
RV+EV RNA SPEC QL NAA+PROBE: NOT DETECTED
SODIUM SERPL-SCNC: 140 MMOL/L (ref 135–145)
SYSTOLIC BLOOD PRESSURE - MUSE: NORMAL MMHG
T AXIS - MUSE: 25 DEGREES
VENTRICULAR RATE- MUSE: 62 BPM
WBC # BLD AUTO: 11.1 10E3/UL (ref 4–11)

## 2023-11-24 PROCEDURE — 83735 ASSAY OF MAGNESIUM: CPT | Performed by: STUDENT IN AN ORGANIZED HEALTH CARE EDUCATION/TRAINING PROGRAM

## 2023-11-24 PROCEDURE — 85014 HEMATOCRIT: CPT | Performed by: STUDENT IN AN ORGANIZED HEALTH CARE EDUCATION/TRAINING PROGRAM

## 2023-11-24 PROCEDURE — 250N000013 HC RX MED GY IP 250 OP 250 PS 637: Performed by: STUDENT IN AN ORGANIZED HEALTH CARE EDUCATION/TRAINING PROGRAM

## 2023-11-24 PROCEDURE — 80048 BASIC METABOLIC PNL TOTAL CA: CPT | Performed by: STUDENT IN AN ORGANIZED HEALTH CARE EDUCATION/TRAINING PROGRAM

## 2023-11-24 PROCEDURE — 999N000157 HC STATISTIC RCP TIME EA 10 MIN

## 2023-11-24 PROCEDURE — 99232 SBSQ HOSP IP/OBS MODERATE 35: CPT | Performed by: STUDENT IN AN ORGANIZED HEALTH CARE EDUCATION/TRAINING PROGRAM

## 2023-11-24 PROCEDURE — 250N000013 HC RX MED GY IP 250 OP 250 PS 637: Performed by: HOSPITALIST

## 2023-11-24 PROCEDURE — 250N000009 HC RX 250: Performed by: HOSPITALIST

## 2023-11-24 PROCEDURE — 87633 RESP VIRUS 12-25 TARGETS: CPT | Performed by: STUDENT IN AN ORGANIZED HEALTH CARE EDUCATION/TRAINING PROGRAM

## 2023-11-24 PROCEDURE — 94640 AIRWAY INHALATION TREATMENT: CPT

## 2023-11-24 PROCEDURE — 250N000011 HC RX IP 250 OP 636: Mod: JZ | Performed by: HOSPITALIST

## 2023-11-24 PROCEDURE — 120N000001 HC R&B MED SURG/OB

## 2023-11-24 PROCEDURE — 36415 COLL VENOUS BLD VENIPUNCTURE: CPT | Performed by: STUDENT IN AN ORGANIZED HEALTH CARE EDUCATION/TRAINING PROGRAM

## 2023-11-24 PROCEDURE — 83880 ASSAY OF NATRIURETIC PEPTIDE: CPT | Performed by: STUDENT IN AN ORGANIZED HEALTH CARE EDUCATION/TRAINING PROGRAM

## 2023-11-24 PROCEDURE — 71046 X-RAY EXAM CHEST 2 VIEWS: CPT

## 2023-11-24 PROCEDURE — 94640 AIRWAY INHALATION TREATMENT: CPT | Mod: 76

## 2023-11-24 PROCEDURE — 87633 RESP VIRUS 12-25 TARGETS: CPT | Performed by: HOSPITALIST

## 2023-11-24 PROCEDURE — 250N000012 HC RX MED GY IP 250 OP 636 PS 637: Performed by: HOSPITALIST

## 2023-11-24 PROCEDURE — 250N000011 HC RX IP 250 OP 636: Mod: JZ | Performed by: STUDENT IN AN ORGANIZED HEALTH CARE EDUCATION/TRAINING PROGRAM

## 2023-11-24 RX ADMIN — IPRATROPIUM BROMIDE AND ALBUTEROL SULFATE 3 ML: .5; 3 SOLUTION RESPIRATORY (INHALATION) at 19:27

## 2023-11-24 RX ADMIN — INSULIN ASPART 2 UNITS: 100 INJECTION, SOLUTION INTRAVENOUS; SUBCUTANEOUS at 12:07

## 2023-11-24 RX ADMIN — ESCITALOPRAM OXALATE 20 MG: 10 TABLET ORAL at 08:17

## 2023-11-24 RX ADMIN — NICOTINE 1 PATCH: 21 PATCH, EXTENDED RELEASE TRANSDERMAL at 08:16

## 2023-11-24 RX ADMIN — PREDNISONE 40 MG: 20 TABLET ORAL at 08:17

## 2023-11-24 RX ADMIN — ACETYLCYSTEINE 4 ML: 200 SOLUTION ORAL; RESPIRATORY (INHALATION) at 08:42

## 2023-11-24 RX ADMIN — IPRATROPIUM BROMIDE AND ALBUTEROL SULFATE 3 ML: .5; 3 SOLUTION RESPIRATORY (INHALATION) at 14:22

## 2023-11-24 RX ADMIN — INSULIN ASPART 1 UNITS: 100 INJECTION, SOLUTION INTRAVENOUS; SUBCUTANEOUS at 07:34

## 2023-11-24 RX ADMIN — CEFTRIAXONE 1 G: 1 INJECTION, POWDER, FOR SOLUTION INTRAMUSCULAR; INTRAVENOUS at 13:06

## 2023-11-24 RX ADMIN — LISINOPRIL 2.5 MG: 2.5 TABLET ORAL at 08:17

## 2023-11-24 RX ADMIN — ATORVASTATIN CALCIUM 20 MG: 10 TABLET, FILM COATED ORAL at 22:03

## 2023-11-24 RX ADMIN — IPRATROPIUM BROMIDE AND ALBUTEROL SULFATE 3 ML: .5; 3 SOLUTION RESPIRATORY (INHALATION) at 08:42

## 2023-11-24 RX ADMIN — INSULIN ASPART 2 UNITS: 100 INJECTION, SOLUTION INTRAVENOUS; SUBCUTANEOUS at 17:15

## 2023-11-24 RX ADMIN — AZITHROMYCIN 500 MG: 500 TABLET, FILM COATED ORAL at 08:17

## 2023-11-24 RX ADMIN — ACETYLCYSTEINE 4 ML: 200 SOLUTION ORAL; RESPIRATORY (INHALATION) at 19:27

## 2023-11-24 RX ADMIN — ENOXAPARIN SODIUM 40 MG: 100 INJECTION SUBCUTANEOUS at 22:03

## 2023-11-24 ASSESSMENT — ACTIVITIES OF DAILY LIVING (ADL)
ADLS_ACUITY_SCORE: 21

## 2023-11-24 NOTE — PROGRESS NOTES
[facility] MEDICINE  PROGRESS NOTE     Remi Mathias  is a 63 year old male admitted with hypoxic respiratory failure in the setting of COPD exacerbation, possible viral illness.  Hypoxia stable progressed, from 3L to now 5L. repeating viral swab (first study invalid 2/2 lab error), re-imaging CXR for interval comparison, and checking BNP. Has been on empiric CAP abx, continuing for now. Pulmonary consulted 11/22 and pending.     Acute respiratory failure with hypoxia  COPD exacerbation  empiric CAP coverage  Acute bronchitis  Viral illness? - recheck swab   A- new to writer 11/24/2023 - treating empirically for CAP and steroids for copd exacerbation will continue-- will recheck Viral Swab as that is suspicious given negative workup so far, recheck CXR and check BNP.  - bnp, CXR 2 views, and Viral Panel  - cont CAP Abx  - pulmonary consulted 11/22, delayed in setting of holiday but appreciate forthecoming additional recommendations   - cont Steroids for now  - duonebs   - CT chest on admission with ild bronchial wall thickening and scattered mucus plugging and emphysema background.  No PE was seen; CXR on 22nd suggest New opacity at right lung base suggests a small right pleural effusion. There is also question minimal blunting at left costophrenic angle; now rechecking 11/24/2023   - Echo with normal EF and normal IVC.  There is mild hypertrophy.  No improvement with net negative balance, trial of iv lasix   - check BNP 11/24/2023   - Strict I's and O's, daily weights.   -Incentive spirometry  -Consider designated CT for PE evaluation if not further improving or vitally unstable overnight     DM  A- some highs in setting of steroid use  P:  - hypoglycemia protocol  - md ssi  - add on am 1u NPH     Anticoagulation   Enoxaparin (Lovenox) SQ    Therapy: PT, OT  Rose:Not present  Lines: None       Current Diet  Orders Placed This Encounter      Moderate Consistent Carb (60 g  CHO per Meal) Diet          Barriers to Discharge: Acute hypoxic respiratory failure not significantly improved    Disposition: Patient    Clinically Significant Risk Factors                  # Hypertension: Noted on problem list       # DMII: A1C = 6.6 % (Ref range: <5.7 %) within past 6 months              Brady Aparicio   Hospitalist Service  [facility]   Securely message with the Vocera Web Console (learn more here)  Text page via [employer] Paging/Directory     ------    Interval History/Subjective:  New to write 11/24/2023   No acute events ov  Pt this AM feels stable no new symptoms or shortness of breath  However unable to wean down on 02 now on 5L still  Discussed repeat cXR and redoing viral panel as initial sample was invalid 2/2 lab error  Discussed steroids and checked in w/ RN       Physical Exam/Objective:  Temp:  [97.6  F (36.4  C)-98.6  F (37  C)] 97.7  F (36.5  C)  Pulse:  [62-85] 62  Resp:  [17-18] 18  BP: (107-128)/(60-78) 118/71  SpO2:  [90 %-94 %] 90 %  Wt Readings from Last 4 Encounters:   11/24/23 87.5 kg (192 lb 14.4 oz)   07/12/23 93.1 kg (205 lb 4.8 oz)   01/18/23 92.6 kg (204 lb 3.2 oz)   07/20/22 96.6 kg (213 lb)     Body mass index is 31.13 kg/m .    General: alert, oriented, and in no acute distress  Pulmonary: coarse breath sounds, decreased at bases, on 5L NC but speaking comfortably   CVS: regular rate and rhythm, no murmurs, rubs, or gallops; no blatant jugular venous distention; no extremity edema and extremities are warm to the touch  GI: soft, nontender, BS+, no rebound or guarding, no conspicuous organomegaly   Neuro: nonfocal, moves all extremities of own volition  Psych: appropriate       Medications:   Personally Reviewed.  Medications      acetylcysteine  4 mL Nebulization BID    atorvastatin  20 mg Oral QPM    azithromycin  500 mg Oral Daily    cefTRIAXone  1 g Intravenous Q24H    enoxaparin ANTICOAGULANT  40 mg Subcutaneous At Bedtime    escitalopram   20 mg Oral Daily    insulin aspart  1-7 Units Subcutaneous TID AC    insulin aspart  1-5 Units Subcutaneous At Bedtime    [START ON 11/25/2023] insulin NPH  1 Units Subcutaneous QAM AC    ipratropium - albuterol 0.5 mg/2.5 mg/3 mL  3 mL Nebulization 3 times daily    lisinopril  2.5 mg Oral Daily    nicotine  1 patch Transdermal Daily    nicotine   Transdermal Q8H    predniSONE  40 mg Oral Daily    sodium chloride (PF)  3 mL Intracatheter Q8H       Data reviewed today: I personally reviewed all new medications, labs, imaging/diagnostics reports over the past 24 hours. Pertinent findings include:    Imaging:   Recent Results (from the past 24 hour(s))   XR Chest 2 Views    Narrative    EXAM: XR CHEST 2 VIEWS  LOCATION: [facility]  DATE: 11/24/2023    INDICATION: worsening hypoxia, and 5 day comparison.   COMPARISON:  11/22/2023 and 11/19/2023.       Impression    IMPRESSION: A right basilar opacity likely represents pleural fluid and atelectasis. There is silhouetting of the left hemidiaphragm which is likely a small volume of left pleural fluid. Pleural effusions may have minimally decreased from the comparison   study. Cannot assess for underlying airspace disease. No pneumothorax. The heart is at the upper limits of normal in size. Status post cholecystectomy.        Labs:  XR Chest 2 Views   Final Result   IMPRESSION: A right basilar opacity likely represents pleural fluid and atelectasis. There is silhouetting of the left hemidiaphragm which is likely a small volume of left pleural fluid. Pleural effusions may have minimally decreased from the comparison    study. Cannot assess for underlying airspace disease. No pneumothorax. The heart is at the upper limits of normal in size. Status post cholecystectomy.       Echocardiogram Complete   Final Result      XR Chest Port 1 View   Final Result   IMPRESSION: New opacity at right lung base suggests a small right pleural effusion. There is  also question minimal blunting at left costophrenic angle. Lungs otherwise clear. Heart size normal.        Recent Results (from the past 24 hour(s))   Respiratory Panel PCR    Collection Time: 11/23/23  4:29 PM    Specimen: Nasopharyngeal; Swab   Result Value Ref Range    Adenovirus Invalid (Invalid) Not Detected    Coronavirus Invalid (Invalid) Not Detected    Human Metapneumovirus Invalid (Invalid) Not Detected    Human Rhin/Enterovirus Invalid (Invalid) Not Detected    Influenza A Invalid (Invalid) Not Detected    Influenza A, H1 Invalid (Invalid) Not Detected    Influenza A 2009 H1N1 Invalid (Invalid) Not Detected    Influenza A, H3 Invalid (Invalid) Not Detected    Influenza B Invalid (Invalid) Not Detected    Parainfluenza Virus 1 Invalid (Invalid) Not Detected    Parainfluenza Virus 2 Invalid (Invalid) Not Detected    Parainfluenza Virus 3 Invalid (Invalid) Not Detected    Parainfluenza Virus 4 Invalid (Invalid) Not Detected    Respiratory Syncytial Virus A Invalid (Invalid) Not Detected    Respiratory Syncytial Virus B Invalid (Invalid) Not Detected    Chlamydia Pneumoniae Invalid (Invalid) Not Detected    Mycoplasma Pneumoniae Invalid (Invalid) Not Detected   Glucose by meter    Collection Time: 11/23/23  6:30 PM   Result Value Ref Range    GLUCOSE BY METER POCT 173 (H) 70 - 99 mg/dL   Glucose by meter    Collection Time: 11/23/23  9:58 PM   Result Value Ref Range    GLUCOSE BY METER POCT 242 (H) 70 - 99 mg/dL   Respiratory Panel PCR    Collection Time: 11/24/23  4:33 AM    Specimen: Nasopharyngeal; Swab   Result Value Ref Range    Adenovirus Not Detected Not Detected    Coronavirus Not Detected Not Detected    Human Metapneumovirus Not Detected Not Detected    Human Rhin/Enterovirus Not Detected Not Detected    Influenza A Not Detected Not Detected    Influenza A, H1 Not Detected Not Detected    Influenza A 2009 H1N1 Not Detected Not Detected    Influenza A, H3 Not Detected Not Detected    Influenza B Not  Detected Not Detected    Parainfluenza Virus 1 Not Detected Not Detected    Parainfluenza Virus 2 Not Detected Not Detected    Parainfluenza Virus 3 Not Detected Not Detected    Parainfluenza Virus 4 Not Detected Not Detected    Respiratory Syncytial Virus A Not Detected Not Detected    Respiratory Syncytial Virus B Not Detected Not Detected    Chlamydia Pneumoniae Not Detected Not Detected    Mycoplasma Pneumoniae Not Detected Not Detected   Basic metabolic panel    Collection Time: 11/24/23  5:03 AM   Result Value Ref Range    Sodium 140 135 - 145 mmol/L    Potassium 3.7 3.4 - 5.3 mmol/L    Chloride 102 98 - 107 mmol/L    Carbon Dioxide (CO2) 28 22 - 29 mmol/L    Anion Gap 10 7 - 15 mmol/L    Urea Nitrogen 22.2 8.0 - 23.0 mg/dL    Creatinine 1.01 0.67 - 1.17 mg/dL    GFR Estimate 83 >60 mL/min/1.73m2    Calcium 9.0 8.8 - 10.2 mg/dL    Glucose 109 (H) 70 - 99 mg/dL   CBC with platelets    Collection Time: 11/24/23  5:03 AM   Result Value Ref Range    WBC Count 11.1 (H) 4.0 - 11.0 10e3/uL    RBC Count 5.14 4.40 - 5.90 10e6/uL    Hemoglobin 16.1 13.3 - 17.7 g/dL    Hematocrit 48.9 40.0 - 53.0 %    MCV 95 78 - 100 fL    MCH 31.3 26.5 - 33.0 pg    MCHC 32.9 31.5 - 36.5 g/dL    RDW 13.5 10.0 - 15.0 %    Platelet Count 158 150 - 450 10e3/uL   Magnesium    Collection Time: 11/24/23  5:03 AM   Result Value Ref Range    Magnesium 2.3 1.7 - 2.3 mg/dL   Nt probnp inpatient    Collection Time: 11/24/23  5:03 AM   Result Value Ref Range    N terminal Pro BNP Inpatient 52 0 - 900 pg/mL   Glucose by meter    Collection Time: 11/24/23  7:15 AM   Result Value Ref Range    GLUCOSE BY METER POCT 162 (H) 70 - 99 mg/dL   Glucose by meter    Collection Time: 11/24/23 11:52 AM   Result Value Ref Range    GLUCOSE BY METER POCT 191 (H) 70 - 99 mg/dL       Pending Labs:  Unresulted Labs Ordered in the Past 30 Days of this Admission       Date and Time Order Name Status Description    11/24/2023  7:11 AM Respiratory Panel PCR In process      11/22/2023 12:35 AM Respiratory Aerobic Bacterial Culture with Gram Stain Preliminary

## 2023-11-24 NOTE — PROGRESS NOTES
11/23/23 2037   Tech Time   $Tech Time (10 minute increments) 3   Nebulizer Assessment & Treatment   $RT Use ONLY Delivery Method Nebulizer - Additional   Nebulizer Device Mask   Pretreatment Heart Rate (beats/min) 76   Pretreatment Resp Rate (breaths/min) 18   Pretreatment O2 sats - (TCU only) 90   Pretreat Breath Sounds - Bilat - All Lobes diminished   Breath Sounds Post-Respiratory Treatment   Posttreatment Heart Rate (beats/min) 77   Posttreatment Resp Rate (breaths/min) 18   Post treatment O2 Sats - (TCU only) 91   Breath Sounds Posttreatment All Fields All Fields   Breath Sounds Posttreatment All Fields no change     Karolina Salmeron, RT

## 2023-11-24 NOTE — PROGRESS NOTES
RESPIRATORY CARE NOTE     Patient Name: Remi Mathias  Today's Date: 11/23/2023       Pt continues to receive duoneb and mucomyst. BS are diminished. Pt is on 5 lp, of oxygen via NC, SpO2 is 90%. Post treatment there is increased aeration. Pt also perceives improvement.  RT encouraged deep breathing and coughing techniques .  RT will continue to monitor and assess.     Elisabeth Bennett, RT

## 2023-11-24 NOTE — PLAN OF CARE
Problem: Gas Exchange Impaired  Goal: Optimal Gas Exchange  Outcome: Not Progressing    Pt alert and oriented x 4. VSS. Lung sounds diminished. On 5 L/min NC; sat in low 90s. BREWER. Denies SOB, chest pain and N/V. Respiratory panel sent. Up independently in room. Calls appropriately.

## 2023-11-24 NOTE — PLAN OF CARE
"  Problem: Adult Inpatient Plan of Care  Goal: Plan of Care Review  Outcome: Progressing  Goal: Patient-Specific Goal (Individualized)  Description: You can add care plan individualizations to a care plan. Examples of Individualization might be:  \"Parent requests to be called daily at 9am for status\", \"I have a hard time hearing out of my right ear\", or \"Do not touch me to wake me up as it startles  me\".  Outcome: Progressing  Goal: Absence of Hospital-Acquired Illness or Injury  Outcome: Progressing  Intervention: Identify and Manage Fall Risk  Recent Flowsheet Documentation  Taken 11/24/2023 0832 by Levi Santamaria, RN  Safety Promotion/Fall Prevention:   activity supervised   assistive device/personal items within reach   clutter free environment maintained   increased rounding and observation   increase visualization of patient   lighting adjusted   nonskid shoes/slippers when out of bed   room door open   patient and family education   room near nurse's station   room organization consistent   safety round/check completed  Intervention: Prevent and Manage VTE (Venous Thromboembolism) Risk  Recent Flowsheet Documentation  Taken 11/24/2023 0832 by Levi Santamaria RN  VTE Prevention/Management: SCDs (sequential compression devices) off  Goal: Optimal Comfort and Wellbeing  Outcome: Progressing  Goal: Readiness for Transition of Care  Outcome: Progressing         Patient is A/Ox4, VSS on 5L O2, Oxygen reading 89-90%. Nebulizers given this morning and afternoon, patient tolerating well. SBA when ambulating. Voiding adequately. Full sensation per Pt. IV saline locked, patent. No new skin issues noted. Patient denying pain during shift.          "

## 2023-11-24 NOTE — PLAN OF CARE
Goal Outcome Evaluation:    Problem: Adult Inpatient Plan of Care  Goal: Optimal Comfort and Wellbeing  Intervention: Monitor Pain and Promote Comfort  Problem: Gas Exchange Impaired  Goal: Optimal Gas Exchange  Intervention: Optimize Oxygenation and Ventilation    A&Ox4. VSS, 5L NC. VI saline locked. Denied pain. Nicotine patch in place. Voiding adequately. Call light within reach.

## 2023-11-25 ENCOUNTER — APPOINTMENT (OUTPATIENT)
Dept: CARDIOLOGY | Facility: CLINIC | Age: 63
DRG: 190 | End: 2023-11-25
Attending: INTERNAL MEDICINE
Payer: COMMERCIAL

## 2023-11-25 LAB
ANION GAP SERPL CALCULATED.3IONS-SCNC: 11 MMOL/L (ref 7–15)
BACTERIA SPT CULT: NORMAL
BUN SERPL-MCNC: 21.8 MG/DL (ref 8–23)
CALCIUM SERPL-MCNC: 8.6 MG/DL (ref 8.8–10.2)
CHLORIDE SERPL-SCNC: 102 MMOL/L (ref 98–107)
CREAT SERPL-MCNC: 0.86 MG/DL (ref 0.67–1.17)
DEPRECATED HCO3 PLAS-SCNC: 25 MMOL/L (ref 22–29)
EGFRCR SERPLBLD CKD-EPI 2021: >90 ML/MIN/1.73M2
GLUCOSE BLDC GLUCOMTR-MCNC: 121 MG/DL (ref 70–99)
GLUCOSE BLDC GLUCOMTR-MCNC: 125 MG/DL (ref 70–99)
GLUCOSE BLDC GLUCOMTR-MCNC: 180 MG/DL (ref 70–99)
GLUCOSE BLDC GLUCOMTR-MCNC: 218 MG/DL (ref 70–99)
GLUCOSE BLDC GLUCOMTR-MCNC: 306 MG/DL (ref 70–99)
GLUCOSE SERPL-MCNC: 107 MG/DL (ref 70–99)
GRAM STAIN RESULT: NORMAL
HOLD SPECIMEN: NORMAL
LVEF ECHO: NORMAL
POTASSIUM SERPL-SCNC: 4 MMOL/L (ref 3.4–5.3)
SODIUM SERPL-SCNC: 138 MMOL/L (ref 135–145)

## 2023-11-25 PROCEDURE — 82310 ASSAY OF CALCIUM: CPT | Performed by: STUDENT IN AN ORGANIZED HEALTH CARE EDUCATION/TRAINING PROGRAM

## 2023-11-25 PROCEDURE — 250N000013 HC RX MED GY IP 250 OP 250 PS 637: Performed by: STUDENT IN AN ORGANIZED HEALTH CARE EDUCATION/TRAINING PROGRAM

## 2023-11-25 PROCEDURE — 250N000011 HC RX IP 250 OP 636: Mod: JZ | Performed by: STUDENT IN AN ORGANIZED HEALTH CARE EDUCATION/TRAINING PROGRAM

## 2023-11-25 PROCEDURE — 99222 1ST HOSP IP/OBS MODERATE 55: CPT | Performed by: INTERNAL MEDICINE

## 2023-11-25 PROCEDURE — 36415 COLL VENOUS BLD VENIPUNCTURE: CPT | Performed by: STUDENT IN AN ORGANIZED HEALTH CARE EDUCATION/TRAINING PROGRAM

## 2023-11-25 PROCEDURE — 250N000012 HC RX MED GY IP 250 OP 636 PS 637: Performed by: HOSPITALIST

## 2023-11-25 PROCEDURE — 99232 SBSQ HOSP IP/OBS MODERATE 35: CPT | Performed by: STUDENT IN AN ORGANIZED HEALTH CARE EDUCATION/TRAINING PROGRAM

## 2023-11-25 PROCEDURE — 93321 DOPPLER ECHO F-UP/LMTD STD: CPT | Mod: 26 | Performed by: INTERNAL MEDICINE

## 2023-11-25 PROCEDURE — 94640 AIRWAY INHALATION TREATMENT: CPT

## 2023-11-25 PROCEDURE — 94640 AIRWAY INHALATION TREATMENT: CPT | Mod: 76

## 2023-11-25 PROCEDURE — 93308 TTE F-UP OR LMTD: CPT

## 2023-11-25 PROCEDURE — 93325 DOPPLER ECHO COLOR FLOW MAPG: CPT

## 2023-11-25 PROCEDURE — 93308 TTE F-UP OR LMTD: CPT | Mod: 26 | Performed by: INTERNAL MEDICINE

## 2023-11-25 PROCEDURE — 250N000012 HC RX MED GY IP 250 OP 636 PS 637: Performed by: STUDENT IN AN ORGANIZED HEALTH CARE EDUCATION/TRAINING PROGRAM

## 2023-11-25 PROCEDURE — 120N000001 HC R&B MED SURG/OB

## 2023-11-25 PROCEDURE — 250N000013 HC RX MED GY IP 250 OP 250 PS 637: Performed by: HOSPITALIST

## 2023-11-25 PROCEDURE — 999N000157 HC STATISTIC RCP TIME EA 10 MIN

## 2023-11-25 PROCEDURE — 93325 DOPPLER ECHO COLOR FLOW MAPG: CPT | Mod: 26 | Performed by: INTERNAL MEDICINE

## 2023-11-25 PROCEDURE — 250N000009 HC RX 250: Performed by: HOSPITALIST

## 2023-11-25 RX ORDER — PREDNISONE 5 MG/1
10 TABLET ORAL DAILY
Qty: 6 TABLET | Refills: 0 | Status: DISCONTINUED | OUTPATIENT
Start: 2023-12-02 | End: 2023-11-27 | Stop reason: HOSPADM

## 2023-11-25 RX ORDER — PREDNISONE 20 MG/1
20 TABLET ORAL DAILY
Qty: 3 TABLET | Refills: 0 | Status: DISCONTINUED | OUTPATIENT
Start: 2023-11-29 | End: 2023-11-27 | Stop reason: HOSPADM

## 2023-11-25 RX ADMIN — IPRATROPIUM BROMIDE AND ALBUTEROL SULFATE 3 ML: .5; 3 SOLUTION RESPIRATORY (INHALATION) at 08:57

## 2023-11-25 RX ADMIN — ESCITALOPRAM OXALATE 20 MG: 10 TABLET ORAL at 08:49

## 2023-11-25 RX ADMIN — INSULIN HUMAN 1 UNITS: 100 INJECTION, SUSPENSION SUBCUTANEOUS at 08:50

## 2023-11-25 RX ADMIN — INSULIN ASPART 4 UNITS: 100 INJECTION, SOLUTION INTRAVENOUS; SUBCUTANEOUS at 18:09

## 2023-11-25 RX ADMIN — ACETYLCYSTEINE 4 ML: 200 SOLUTION ORAL; RESPIRATORY (INHALATION) at 08:58

## 2023-11-25 RX ADMIN — ACETYLCYSTEINE 4 ML: 200 SOLUTION ORAL; RESPIRATORY (INHALATION) at 19:29

## 2023-11-25 RX ADMIN — LISINOPRIL 2.5 MG: 2.5 TABLET ORAL at 08:49

## 2023-11-25 RX ADMIN — INSULIN ASPART 2 UNITS: 100 INJECTION, SOLUTION INTRAVENOUS; SUBCUTANEOUS at 11:54

## 2023-11-25 RX ADMIN — ENOXAPARIN SODIUM 40 MG: 100 INJECTION SUBCUTANEOUS at 20:34

## 2023-11-25 RX ADMIN — IPRATROPIUM BROMIDE AND ALBUTEROL SULFATE 3 ML: .5; 3 SOLUTION RESPIRATORY (INHALATION) at 14:54

## 2023-11-25 RX ADMIN — ATORVASTATIN CALCIUM 20 MG: 10 TABLET, FILM COATED ORAL at 20:34

## 2023-11-25 RX ADMIN — PREDNISONE 40 MG: 20 TABLET ORAL at 08:49

## 2023-11-25 RX ADMIN — IPRATROPIUM BROMIDE AND ALBUTEROL SULFATE 3 ML: .5; 3 SOLUTION RESPIRATORY (INHALATION) at 19:29

## 2023-11-25 RX ADMIN — AZITHROMYCIN 500 MG: 500 TABLET, FILM COATED ORAL at 08:49

## 2023-11-25 RX ADMIN — NICOTINE 1 PATCH: 21 PATCH, EXTENDED RELEASE TRANSDERMAL at 08:49

## 2023-11-25 ASSESSMENT — ACTIVITIES OF DAILY LIVING (ADL)
ADLS_ACUITY_SCORE: 21
ADLS_ACUITY_SCORE: 20
ADLS_ACUITY_SCORE: 21

## 2023-11-25 NOTE — CONSULTS
PULMONARY / CRITICAL CARE CONSULT  NOTE    Date / Time of Admission:  11/19/2023  7:54 PM    Assessment:     Remi Mathias is a 63 year old male with history HTN, DM and tobacco use 1ppd. 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    Patient presents to Urgent care complaining of shortness of breath and productive cough for one week.   Patient was hypoxic upon arrival mid 80s, afebrile and hemodynamically stable.   Laboratory work showed normal BMP, lactic acid, troponin, D-dimer, normal WBC and diff, negative COVID/influenza, Chest CT with contrast showed emphysema and bronchial thickening, no pulmonary embolism.   EKG revealed normal sinus rhythm with right bundle branch block without prior for comparison.    Patient was admitted to  on 11/19 with diagnosis of COPD exacerbation and acute bronchitis. Treated with O2 supplementation, steroids and Abx.   Persistent high O2 requirements, pulmonary service consulted.     Acute hypoxic respiratory failure   Outside report of CT scan , no pulmonary embolism, emphysema and peribronchial thickening  Patient is currently treated for COPD exacerbation and acute bronchitis.   Hemodynamically stable. No respiratory distress.   SpO2 mildly improves with O2 supplementation. SpO2 improves when lying down.   Recommend shunt work up. Get limited echocardiogram with bubble study.   Load outside chest CT scan images in our system.   COPD exacerbation   Significant tobacco use. Emphysematous changes.  Treated for COPD exacerbation  Clinically better, start steroid taper  PFTs as outpatient. Smoking cessation counseling   Acute bronchitis   HTN  DM  Tobacco use  Obesity Body mass index is 31.28 kg/m .    Advance Directives:  Full code    Plan:   Titrate FiO2 to keep SpO2 > 88%  Bronchodilators as needed  Titrate BP meds  Echocardiogram with bubble study   Steroid taper  DVT prophylaxis lovenox SQ    Case was discussed with Dr. Aparicio  Please contact me if you have any  questions.    Rickey Guaman  Pulmonary / Critical Care  11/25/2023  11:02 AM      Reason for consult : hypoxia    HPI:  Remi Mathias is a 63 year old male with history HTN, DM and tobacco use 1ppd. 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    Patient presents to Urgent care complaining of shortness of breath and productive cough for one week.   Patient was hypoxic upon arrival mid 80s, afebrile and hemodynamically stable.   Laboratory work showed normal BMP, lactic acid, troponin, D-dimer, normal WBC and diff, negative COVID/influenza, Chest CT with contrast showed emphysema and bronchial thickening, no pulmonary embolism.   EKG revealed normal sinus rhythm with right bundle branch block without prior for comparison.    Patient was admitted to  on 11/19 with diagnosis of COPD exacerbation and acute bronchitis. Treated with O2 supplementation, steroids and Abx.   Persistent high O2 requirements, pulmonary service consulted.     Past Medical History:   Diagnosis Date    Allergic rhinitis     Created by Conversion  Replacement Utility updated for latest IMO load    Anxiety state     Created by Conversion  Replacement Utility updated for latest IMO load    Dermatophytosis of nail     Created by Conversion     Hyperlipidemia     Created by Conversion     Tobacco use disorder     Created by Conversion     Type 2 diabetes mellitus (H)     Created by Conversion      Allergies: Metformin     MEDS:  Current Facility-Administered Medications   Medication    acetaminophen (TYLENOL) tablet 650 mg    Or    acetaminophen (TYLENOL) Suppository 650 mg    acetylcysteine (MUCOMYST) 20 % nebulizer solution 4 mL    atorvastatin (LIPITOR) tablet 20 mg    benzocaine-menthol (CEPACOL) 15-3.6 MG lozenge 1 lozenge    calcium carbonate (TUMS) chewable tablet 1,000 mg    glucose gel 15-30 g    Or    dextrose 50 % injection 25-50 mL    Or    glucagon injection 1 mg    enoxaparin ANTICOAGULANT (LOVENOX) injection 40 mg     escitalopram (LEXAPRO) tablet 20 mg    guaiFENesin (ROBITUSSIN) 20 mg/mL solution 200 mg    insulin aspart (NovoLOG) injection (RAPID ACTING)    insulin aspart (NovoLOG) injection (RAPID ACTING)    insulin NPH injection 1 Units    ipratropium - albuterol 0.5 mg/2.5 mg/3 mL (DUONEB) neb solution 3 mL    lidocaine (LMX4) cream    lidocaine 1 % 0.1-1 mL    lisinopril (ZESTRIL) tablet 2.5 mg    miconazole (MICATIN) 2 % powder    nicotine (COMMIT) lozenge 4 mg    nicotine (NICODERM CQ) 21 MG/24HR 24 hr patch 1 patch    nicotine Patch in Place    ondansetron (ZOFRAN ODT) ODT tab 4 mg    Or    ondansetron (ZOFRAN) injection 4 mg    predniSONE (DELTASONE) tablet 40 mg    senna-docusate (SENOKOT-S/PERICOLACE) 8.6-50 MG per tablet 1 tablet    Or    senna-docusate (SENOKOT-S/PERICOLACE) 8.6-50 MG per tablet 2 tablet    sodium chloride (PF) 0.9% PF flush 3 mL    sodium chloride (PF) 0.9% PF flush 3 mL     Social History     Socioeconomic History    Marital status:      Spouse name: Not on file    Number of children: Not on file    Years of education: Not on file    Highest education level: Not on file   Occupational History    Not on file   Tobacco Use    Smoking status: Every Day     Packs/day: 0.50     Years: 45.00     Additional pack years: 0.00     Total pack years: 22.50     Types: Cigarettes    Smokeless tobacco: Never    Tobacco comments:     going to start nicotine patches   Vaping Use    Vaping Use: Never used   Substance and Sexual Activity    Alcohol use: Not Currently    Drug use: No    Sexual activity: Yes     Partners: Female     Birth control/protection: None   Other Topics Concern    Not on file   Social History Narrative    Not on file     Social Determinants of Health     Financial Resource Strain: Not on file   Food Insecurity: Not on file   Transportation Needs: Not on file   Physical Activity: Not on file   Stress: Not on file   Social Connections: Not on file   Interpersonal Safety: Not on file    Housing Stability: Not on file     Family History   Problem Relation Age of Onset    Lung Cancer Mother     Heart Disease Father     Hypertension Father     Diabetes Sister      ROS  - Twelve point review of systems were discussed with patient, positive finding in HPI    Objective:   VITALS:  /78 (BP Location: Right arm, Patient Position: Semi-Hannon's, Cuff Size: Adult Regular)   Pulse 69   Temp 97.9  F (36.6  C) (Oral)   Resp 17   Wt 87.9 kg (193 lb 12.8 oz)   SpO2 90%   BMI 31.28 kg/m    VENT:  Resp: 17    EXAM:   Gen: obese, awake, alert, no distress  HEENT: pink conjunctiva, moist mucosa, Mallampati III/IV  Neck: no thyromegaly, masses or JVD  Lungs: decrease breath sounds at the bases otherwise clear  CV: regular, no murmurs or gallops appreciated  Abdomen: soft, NT, BS wnl  Ext: no edema  Neuro: CN II-XII intact, non focal       Data Review:  Recent Labs   Lab 11/25/23  0755 11/25/23  0616 11/25/23  0615 11/24/23  2133 11/24/23  1619 11/24/23  1152   * 107* 121* 250* 217* 191*      11/25/23 06:16   Sodium 138   Potassium 4.0   Chloride 102   Carbon Dioxide (CO2) 25   Urea Nitrogen 21.8   Creatinine 0.86   GFR Estimate >90   Calcium 8.6 (L)   Anion Gap 11   Glucose 107 (H)      11/24/23 05:03   WBC 11.1 (H)   Hemoglobin 16.1   Hematocrit 48.9   Platelet Count 158   RBC Count 5.14   MCV 95   MCH 31.3   MCHC 32.9   RDW 13.5     XR CHEST 2 VIEWS  LOCATION: [facility]  DATE: 11/24/2023   INDICATION: worsening hypoxia, and 5 day comparison.   COMPARISON:  11/22/2023 and 11/19/2023.   IMPRESSION: A right basilar opacity likely represents pleural fluid and atelectasis. There is silhouetting of the left hemidiaphragm which is likely a small volume of left pleural fluid. Pleural effusions may have minimally decreased from the comparison   study. Cannot assess for underlying airspace disease. No pneumothorax. The heart is at the upper limits of normal in size. Status post  cholecystectomy    Chest CT scan with IV contrast 11/19/2023  Impression   1.  Mild bronchial wall thickening and scattered mucus plugging.   2.  Mild upper lung predominant paraseptal emphysema.     Echocardiogram 11/23/2023  Normal left ventricular size. Mild concentric left ventricular hypertrophy.  Left ventricular systolic function is normal. The left ventricular ejection  fraction is estimated at 55 to 60%. No regional wall motion abnormality  noted.Diastolic Doppler findings (E/E' ratio and/or other parameters) suggest  left ventricular filling pressures are normal.  Mild right ventricular enlargement. Normal right ventricular systolic function.  Mild right atrial enlargement.  No hemodynamically significant valve abnormality.  The aortic root measures mildly enlarged at 4.0 cm.  IVC diameter <2.1 cm collapsing >50% with sniff suggests a normal RA pressure of 3 mmHg.    By:  Rickey Guaman MD, 11/25/2023  11:02 AM    Primary Care Physician:  Rosie Dorado

## 2023-11-25 NOTE — PROGRESS NOTES
Pt on 5L NC sating high 80's to low 90's. Scheduled neb treatment given. BS clear:diminished. RT will continue to follow.

## 2023-11-25 NOTE — PROGRESS NOTES
Pt given Duoneb and MM neb as ordered.  Pt on 5 LPM NC, o2 sats low 90's, BS diminished.  Will continue to monitor pt.

## 2023-11-25 NOTE — PROGRESS NOTES
[facility] MEDICINE  PROGRESS NOTE     Remi Mathias  is a 63 year old male admitted with hypoxic respiratory failure in the setting of COPD exacerbation, possible viral illness.      CT chest on admission with mild bronchial wall thickening and scattered mucus plugging and emphysema background.  No PE was seen; CXR on 22nd suggest New opacity at right lung base suggests a small right pleural effusion. There is also question minimal blunting at left costophrenic angle; stable on 11/24, empirically covering pna.     Hypoxia progressed from 3L to 5L but now relatively stable. Repeated viral swab (first study invalid 2/2 lab error), re-imaging CXR for interval comparison, and checking BNP. Nonremarkable workup. Pt treated with empiric CAP abx. Pulmonary Medicine was consulted 11/22 and is pending (the Thanksgiving holiday likely led to a logistical delay)- we have paged again on 11/25/2023; pt is stable but difficulty weaning 02. Suspect he may require some home 02 at eventual discharge.     Acute respiratory failure with hypoxia  COPD exacerbation  empiric CAP coverage  Acute bronchitis  Viral illness? - recheck swab   A- new to writer 11/24/2023 - treating empirically for CAP and steroids for copd exacerbation will continue-- will recheck Viral Swab as that is suspicious given negative workup so far, recheck CXR and check BNP. Those were negative, suspect he may require some home 02 but appreciate forthecoming further input from pulmonary medicine.  P:  - bnp, CXR 2 views, and Viral Panel - no new culprit findings  - cont CAP Abx  - pulmonary consulted 11/22, delayed in setting of holiday but appreciate forthecoming additional recommendations   - cont Steroids for now - subjectively possibly helping  - duonebs   - Echo with normal EF and normal IVC.  There is mild hypertrophy.  No improvement with net negative balance, trial of iv lasix   - check BNP 11/24/2023   - Strict I's  and O's, daily weights.   -Incentive spirometry  -Consider designated CT for PE evaluation if not further improving or vitally unstable overnight     DM  A- some highs in setting of steroid use; sugars for 11/25 not yet obtained, tbd  P:  - hypoglycemia protocol  - md ssi  - add on am 1u NPH     Anticoagulation   Enoxaparin (Lovenox) SQ    Therapy: PT, OT  Rose:Not present  Lines: None       Current Diet  Orders Placed This Encounter      Moderate Consistent Carb (60 g CHO per Meal) Diet          Barriers to Discharge: Acute hypoxic respiratory failure not significantly improved    Disposition: Patient    Clinically Significant Risk Factors                  # Hypertension: Noted on problem list       # DMII: A1C = 6.6 % (Ref range: <5.7 %) within past 6 months              [facility]edaDepartment [facility]  [facility]   Securely message with the Vocera Web Console (learn more here)  Text page via [employer] Paging/Directory     ------    Interval History/Subjective:  No acute events ov  Pt this AM feels stable no new symptoms or shortness of breath  However unable to wean down on 02 now on 5L still -- discussed he might need home 02 and could re-assess w/ his pcp  Pending pulmonary consult, discussed w/ [facility]  Pt had no further concerns or questions or symptoms     Physical Exam/Objective:  Temp:  [97.6  F (36.4  C)-98.3  F (36.8  C)] 97.9  F (36.6  C)  Pulse:  [58-83] 58  Resp:  [16-18] 16  BP: (109-128)/(57-78) 109/59  SpO2:  [90 %-93 %] 93 %  Wt Readings from Last 4 Encounters:   11/25/23 87.9 kg (193 lb 12.8 oz)   07/12/23 93.1 kg (205 lb 4.8 oz)   01/18/23 92.6 kg (204 lb 3.2 oz)   07/20/22 96.6 kg (213 lb)     Body mass index is 31.28 kg/m .    General: alert, oriented, and in no acute distress  Pulmonary: coarse breath sounds, decreased at bases, on 5L NC and speaking comfortably   CVS: regular rate and rhythm, no murmurs, rubs, or gallops; no blatant jugular venous distention; no  extremity edema and extremities are warm to the touch  GI: soft, nontender, BS+, no rebound or guarding, no conspicuous organomegaly   Neuro: nonfocal, moves all extremities of own volition  Psych: appropriate       Medications:   Personally Reviewed.  Medications      acetylcysteine  4 mL Nebulization BID    atorvastatin  20 mg Oral QPM    azithromycin  500 mg Oral Daily    enoxaparin ANTICOAGULANT  40 mg Subcutaneous At Bedtime    escitalopram  20 mg Oral Daily    insulin aspart  1-7 Units Subcutaneous TID AC    insulin aspart  1-5 Units Subcutaneous At Bedtime    insulin NPH  1 Units Subcutaneous QAM AC    ipratropium - albuterol 0.5 mg/2.5 mg/3 mL  3 mL Nebulization 3 times daily    lisinopril  2.5 mg Oral Daily    nicotine  1 patch Transdermal Daily    nicotine   Transdermal Q8H    predniSONE  40 mg Oral Daily    sodium chloride (PF)  3 mL Intracatheter Q8H       Data reviewed today: I personally reviewed all new medications, labs, imaging/diagnostics reports over the past 24 hours. Pertinent findings include:    Imaging:   Recent Results (from the past 24 hour(s))   XR Chest 2 Views    Narrative    EXAM: XR CHEST 2 VIEWS  LOCATION: [facility]  DATE: 11/24/2023    INDICATION: worsening hypoxia, and 5 day comparison.   COMPARISON:  11/22/2023 and 11/19/2023.       Impression    IMPRESSION: A right basilar opacity likely represents pleural fluid and atelectasis. There is silhouetting of the left hemidiaphragm which is likely a small volume of left pleural fluid. Pleural effusions may have minimally decreased from the comparison   study. Cannot assess for underlying airspace disease. No pneumothorax. The heart is at the upper limits of normal in size. Status post cholecystectomy.        Labs:  XR Chest 2 Views   Final Result   IMPRESSION: A right basilar opacity likely represents pleural fluid and atelectasis. There is silhouetting of the left hemidiaphragm which is likely a small volume  of left pleural fluid. Pleural effusions may have minimally decreased from the comparison    study. Cannot assess for underlying airspace disease. No pneumothorax. The heart is at the upper limits of normal in size. Status post cholecystectomy.       Echocardiogram Complete   Final Result      XR Chest Port 1 View   Final Result   IMPRESSION: New opacity at right lung base suggests a small right pleural effusion. There is also question minimal blunting at left costophrenic angle. Lungs otherwise clear. Heart size normal.        Recent Results (from the past 24 hour(s))   Respiratory Panel PCR    Collection Time: 11/24/23  8:24 AM    Specimen: Nasopharyngeal; Swab   Result Value Ref Range    Adenovirus Not Detected Not Detected    Coronavirus Not Detected Not Detected    Human Metapneumovirus Not Detected Not Detected    Human Rhin/Enterovirus Not Detected Not Detected    Influenza A Not Detected Not Detected    Influenza A, H1 Not Detected Not Detected    Influenza A 2009 H1N1 Not Detected Not Detected    Influenza A, H3 Not Detected Not Detected    Influenza B Not Detected Not Detected    Parainfluenza Virus 1 Not Detected Not Detected    Parainfluenza Virus 2 Not Detected Not Detected    Parainfluenza Virus 3 Not Detected Not Detected    Parainfluenza Virus 4 Not Detected Not Detected    Respiratory Syncytial Virus A Not Detected Not Detected    Respiratory Syncytial Virus B Not Detected Not Detected    Chlamydia Pneumoniae Not Detected Not Detected    Mycoplasma Pneumoniae Not Detected Not Detected   Glucose by meter    Collection Time: 11/24/23 11:52 AM   Result Value Ref Range    GLUCOSE BY METER POCT 191 (H) 70 - 99 mg/dL   Glucose by meter    Collection Time: 11/24/23  4:19 PM   Result Value Ref Range    GLUCOSE BY METER POCT 217 (H) 70 - 99 mg/dL   Glucose by meter    Collection Time: 11/24/23  9:33 PM   Result Value Ref Range    GLUCOSE BY METER POCT 250 (H) 70 - 99 mg/dL   Glucose by meter    Collection  Time: 11/25/23  6:15 AM   Result Value Ref Range    GLUCOSE BY METER POCT 121 (H) 70 - 99 mg/dL   Basic metabolic panel    Collection Time: 11/25/23  6:16 AM   Result Value Ref Range    Sodium 138 135 - 145 mmol/L    Potassium 4.0 3.4 - 5.3 mmol/L    Chloride 102 98 - 107 mmol/L    Carbon Dioxide (CO2) 25 22 - 29 mmol/L    Anion Gap 11 7 - 15 mmol/L    Urea Nitrogen 21.8 8.0 - 23.0 mg/dL    Creatinine 0.86 0.67 - 1.17 mg/dL    GFR Estimate >90 >60 mL/min/1.73m2    Calcium 8.6 (L) 8.8 - 10.2 mg/dL    Glucose 107 (H) 70 - 99 mg/dL       Pending Labs:  Unresulted Labs Ordered in the Past 30 Days of this Admission       Date and Time Order Name Status Description    11/22/2023 12:35 AM Respiratory Aerobic Bacterial Culture with Gram Stain Preliminary

## 2023-11-25 NOTE — PLAN OF CARE
Problem: Gas Exchange Impaired  Goal: Optimal Gas Exchange  Intervention: Optimize Oxygenation and Ventilation  Recent Flowsheet Documentation  Taken 11/25/2023 0050 by Digna Kraus RN  Head of Bed (HOB) Positioning: HOB at 20-30 degrees   Goal Outcome Evaluation:  Patient is alert and oriented X 4. Denied pain. VSS on 5L via NC. Ambulated independently to the bathroom and voided. IV saline locked. Call light within reach.

## 2023-11-25 NOTE — PROGRESS NOTES
Pt A&Ox 4. VSS on 5L. Voiding adequately. Up independently in room. Pt denies pain. Blood sugar was 250 at bedtime. IV SL. Nebulizer completed by RT. Progressing towards plan of care.

## 2023-11-25 NOTE — PLAN OF CARE
Problem: Gas Exchange Impaired  Goal: Optimal Gas Exchange  Outcome: Progressing     Problem: Adult Inpatient Plan of Care  Goal: Optimal Comfort and Wellbeing  Outcome: Progressing  Intervention: Monitor Pain and Promote Comfort  Recent Flowsheet Documentation  Taken 11/25/2023 0849 by Levi Santamaria RN  Pain Management Interventions:   care clustered   quiet environment facilitated   rest     Problem: Adult Inpatient Plan of Care  Goal: Plan of Care Review  Outcome: Progressing     Patient has been lethargic all day. He is Ox4 and able to make his needs known. VSS on 5L O2. Lung sounds are diminished with patient reporting SOB upon exertion. Patient is ambulating independently in the room. Voiding adequately. Full sensation per Pt. IV saline locked, patent. No new skin issues noted. Patient denying pain during shift. Bubble echo completed today.

## 2023-11-26 ENCOUNTER — APPOINTMENT (OUTPATIENT)
Dept: CT IMAGING | Facility: CLINIC | Age: 63
DRG: 190 | End: 2023-11-26
Attending: INTERNAL MEDICINE
Payer: COMMERCIAL

## 2023-11-26 LAB
ANION GAP SERPL CALCULATED.3IONS-SCNC: 7 MMOL/L (ref 7–15)
BUN SERPL-MCNC: 20.4 MG/DL (ref 8–23)
CALCIUM SERPL-MCNC: 9 MG/DL (ref 8.8–10.2)
CHLORIDE SERPL-SCNC: 103 MMOL/L (ref 98–107)
CREAT SERPL-MCNC: 0.96 MG/DL (ref 0.67–1.17)
DEPRECATED HCO3 PLAS-SCNC: 29 MMOL/L (ref 22–29)
EGFRCR SERPLBLD CKD-EPI 2021: 88 ML/MIN/1.73M2
GLUCOSE BLDC GLUCOMTR-MCNC: 118 MG/DL (ref 70–99)
GLUCOSE BLDC GLUCOMTR-MCNC: 123 MG/DL (ref 70–99)
GLUCOSE BLDC GLUCOMTR-MCNC: 171 MG/DL (ref 70–99)
GLUCOSE BLDC GLUCOMTR-MCNC: 222 MG/DL (ref 70–99)
GLUCOSE BLDC GLUCOMTR-MCNC: 271 MG/DL (ref 70–99)
GLUCOSE SERPL-MCNC: 115 MG/DL (ref 70–99)
POTASSIUM SERPL-SCNC: 4.1 MMOL/L (ref 3.4–5.3)
SODIUM SERPL-SCNC: 139 MMOL/L (ref 135–145)

## 2023-11-26 PROCEDURE — 36415 COLL VENOUS BLD VENIPUNCTURE: CPT | Performed by: STUDENT IN AN ORGANIZED HEALTH CARE EDUCATION/TRAINING PROGRAM

## 2023-11-26 PROCEDURE — 99232 SBSQ HOSP IP/OBS MODERATE 35: CPT | Performed by: STUDENT IN AN ORGANIZED HEALTH CARE EDUCATION/TRAINING PROGRAM

## 2023-11-26 PROCEDURE — 120N000001 HC R&B MED SURG/OB

## 2023-11-26 PROCEDURE — 250N000011 HC RX IP 250 OP 636: Mod: JZ | Performed by: STUDENT IN AN ORGANIZED HEALTH CARE EDUCATION/TRAINING PROGRAM

## 2023-11-26 PROCEDURE — 71275 CT ANGIOGRAPHY CHEST: CPT

## 2023-11-26 PROCEDURE — 94640 AIRWAY INHALATION TREATMENT: CPT

## 2023-11-26 PROCEDURE — 250N000013 HC RX MED GY IP 250 OP 250 PS 637: Performed by: HOSPITALIST

## 2023-11-26 PROCEDURE — 250N000009 HC RX 250: Performed by: HOSPITALIST

## 2023-11-26 PROCEDURE — 999N000157 HC STATISTIC RCP TIME EA 10 MIN

## 2023-11-26 PROCEDURE — 99233 SBSQ HOSP IP/OBS HIGH 50: CPT | Performed by: INTERNAL MEDICINE

## 2023-11-26 PROCEDURE — 250N000012 HC RX MED GY IP 250 OP 636 PS 637: Performed by: INTERNAL MEDICINE

## 2023-11-26 PROCEDURE — 94640 AIRWAY INHALATION TREATMENT: CPT | Mod: 76

## 2023-11-26 PROCEDURE — 80048 BASIC METABOLIC PNL TOTAL CA: CPT | Performed by: STUDENT IN AN ORGANIZED HEALTH CARE EDUCATION/TRAINING PROGRAM

## 2023-11-26 RX ORDER — IOPAMIDOL 755 MG/ML
75 INJECTION, SOLUTION INTRAVASCULAR ONCE
Status: COMPLETED | OUTPATIENT
Start: 2023-11-26 | End: 2023-11-26

## 2023-11-26 RX ADMIN — NICOTINE 1 PATCH: 21 PATCH, EXTENDED RELEASE TRANSDERMAL at 09:06

## 2023-11-26 RX ADMIN — ATORVASTATIN CALCIUM 20 MG: 10 TABLET, FILM COATED ORAL at 19:52

## 2023-11-26 RX ADMIN — INSULIN ASPART 3 UNITS: 100 INJECTION, SOLUTION INTRAVENOUS; SUBCUTANEOUS at 17:44

## 2023-11-26 RX ADMIN — ACETYLCYSTEINE 4 ML: 200 SOLUTION ORAL; RESPIRATORY (INHALATION) at 07:59

## 2023-11-26 RX ADMIN — INSULIN HUMAN 1 UNITS: 100 INJECTION, SUSPENSION SUBCUTANEOUS at 09:12

## 2023-11-26 RX ADMIN — IOPAMIDOL 75 ML: 755 INJECTION, SOLUTION INTRAVENOUS at 13:50

## 2023-11-26 RX ADMIN — ESCITALOPRAM OXALATE 20 MG: 10 TABLET ORAL at 09:06

## 2023-11-26 RX ADMIN — INSULIN ASPART 2 UNITS: 100 INJECTION, SOLUTION INTRAVENOUS; SUBCUTANEOUS at 13:38

## 2023-11-26 RX ADMIN — IPRATROPIUM BROMIDE AND ALBUTEROL SULFATE 3 ML: .5; 3 SOLUTION RESPIRATORY (INHALATION) at 07:58

## 2023-11-26 RX ADMIN — LISINOPRIL 2.5 MG: 2.5 TABLET ORAL at 09:28

## 2023-11-26 RX ADMIN — PREDNISONE 30 MG: 5 TABLET ORAL at 09:06

## 2023-11-26 RX ADMIN — ACETYLCYSTEINE 4 ML: 200 SOLUTION ORAL; RESPIRATORY (INHALATION) at 19:56

## 2023-11-26 RX ADMIN — IPRATROPIUM BROMIDE AND ALBUTEROL SULFATE 3 ML: .5; 3 SOLUTION RESPIRATORY (INHALATION) at 14:00

## 2023-11-26 RX ADMIN — ENOXAPARIN SODIUM 40 MG: 100 INJECTION SUBCUTANEOUS at 20:32

## 2023-11-26 RX ADMIN — IPRATROPIUM BROMIDE AND ALBUTEROL SULFATE 3 ML: .5; 3 SOLUTION RESPIRATORY (INHALATION) at 19:56

## 2023-11-26 ASSESSMENT — ACTIVITIES OF DAILY LIVING (ADL)
ADLS_ACUITY_SCORE: 20

## 2023-11-26 NOTE — PROGRESS NOTES
[facility] MEDICINE  PROGRESS NOTE     Remi Mathias  is a 63 year old male admitted with hypoxic respiratory failure in the setting of COPD exacerbation, possible viral illness.      CT chest on admission with mild bronchial wall thickening and scattered mucus plugging and emphysema background.  No PE was seen; CXR on 22nd suggest New opacity at right lung base suggests a small right pleural effusion. There is also question minimal blunting at left costophrenic angle; stable on 11/24, empirically covering pna.     Hypoxia progressed from 3L to 5L but now relatively stable. Repeated viral swab (first study invalid 2/2 lab error), re-imaging CXR for interval comparison, and checking BNP. Nonremarkable workup. Pt treated with empiric CAP abx. Pulmonary Medicine was consulted 11/22 (the Thanksgiving holiday likely led to a logistical delay). Given platypnea, an echocardiogram w/ bubble study was obtained which does not demonstrate a shunt.     Pt remains stable, having difficulty weaning the 02- perhaps this is a new baseline (explained to pt not known yet if persistent baseline or might improve in future weeks/months). Suspect he may require some home 02 at discharge. Will order a home ambulation test for this afternoon and possible discharge tomorrow unless any further workup/pending pulmonary follow up.     Acute respiratory failure with hypoxia  COPD exacerbation  empiric CAP coverage  Acute bronchitis  Viral illness? - recheck swab   A- treating empirically for CAP and steroids for copd exacerbation will continue-- will recheck Viral Swab as that is suspicious given negative workup so far, recheck CXR and check BNP. Those were negative, suspect he may require some home 02 but appreciate further input from pulmonary medicine. Given platypnea, an echocardiogram w/ bubble study was obtained which does not demonstrate a shunt.    P:  - bnp, CXR 2 views, and Viral Panel - no  new culprit findings  - cont CAP Abx  - pulmonary consulted 11/22, delayed in setting of holiday but appreciate forthecoming additional recommendations   - cont Steroids for now - subjectively possibly helping  - duonebs   - Echo with normal EF and normal IVC.  There is mild hypertrophy.  No improvement with net negative balance, trial of iv lasix   - check BNP 11/24/2023   - Strict I's and O's, daily weights.   -Incentive spirometry    DM  A- some highs in setting of steroid use; controlled   P:  - hypoglycemia protocol  - md ssi  - add on am 1u NPH     Anticoagulation   Enoxaparin (Lovenox) SQ    Therapy: PT, OT  Rose:Not present  Lines: None       Current Diet  Orders Placed This Encounter      Moderate Consistent Carb (60 g CHO per Meal) Diet          Barriers to Discharge: Acute hypoxic respiratory failure not significantly improved    Disposition: Patient    Clinically Significant Risk Factors                  # Hypertension: Noted on problem list       # DMII: A1C = 6.6 % (Ref range: <5.7 %) within past 6 months              Brady Aparicio   Hospitalist Service  [facility]   Securely message with the Vocera Web Console (learn more here)  Text page via [employer] Paging/Directory     ------    Interval History/Subjective:  No acute events ov  Pt this AM feels stable no new symptoms or shortness of breath  However still unable to wean down on 02 now on 5L still -- discussed he might need home 02 and could re-assess w/ his pcp and this might be a temporary baseline- may possibly wean further in future weeks/months  Reviewed echo w/ bubble study, pulmonary to follow up as well  Pt had no further concerns or questions or symptoms     Physical Exam/Objective:  Temp:  [98.1  F (36.7  C)-98.4  F (36.9  C)] 98.1  F (36.7  C)  Pulse:  [61-82] 61  Resp:  [17-24] 20  BP: (111-118)/(66-71) 111/70  SpO2:  [89 %-97 %] 91 %  Wt Readings from Last 4 Encounters:   11/26/23 88.1 kg (194 lb 3.2 oz)    23 93.1 kg (205 lb 4.8 oz)   23 92.6 kg (204 lb 3.2 oz)   22 96.6 kg (213 lb)     Body mass index is 31.34 kg/m .    General: alert, oriented, and in no acute distress  Pulmonary: coarse breath sounds, decreased at bases, on 5L NC and speaking comfortably   CVS: regular rate and rhythm, no murmurs, rubs, or gallops; no blatant jugular venous distention; no extremity edema and extremities are warm to the touch  GI: soft, nontender, BS+, no rebound or guarding, no conspicuous organomegaly   Neuro: nonfocal, moves all extremities of own volition  Psych: appropriate       Medications:   Personally Reviewed.  Medications      acetylcysteine  4 mL Nebulization BID    atorvastatin  20 mg Oral QPM    enoxaparin ANTICOAGULANT  40 mg Subcutaneous At Bedtime    escitalopram  20 mg Oral Daily    insulin aspart  1-7 Units Subcutaneous TID AC    insulin aspart  1-5 Units Subcutaneous At Bedtime    insulin NPH  1 Units Subcutaneous QAM AC    ipratropium - albuterol 0.5 mg/2.5 mg/3 mL  3 mL Nebulization 3 times daily    lisinopril  2.5 mg Oral Daily    nicotine  1 patch Transdermal Daily    nicotine   Transdermal Q8H    predniSONE  30 mg Oral Daily    Followed by    [START ON 2023] predniSONE  20 mg Oral Daily    Followed by    [START ON 2023] predniSONE  10 mg Oral Daily    sodium chloride (PF)  3 mL Intracatheter Q8H       Data reviewed today: I personally reviewed all new medications, labs, imaging/diagnostics reports over the past 24 hours. Pertinent findings include:    Imaging:   Recent Results (from the past 24 hour(s))   Echocardiogram Limited with Bubble Study   Result Value    LVEF  60-65%    Narrative    [id number]  [id number]  [id number]  [id number]^SHON [address]EEV[address]^[place] [address]     Name: KARLA GRIMES  MRN: [id number]  : 1960  Study Date: 2023 12:35 PM  Age: 63 yrs  Gender: Male  Patient Location: [facility]  Reason For  Study: ASD  Ordering Physician: LOUIS DUTTON  Referring Physician: SYSTEM, PROVIDER NOT IN  Performed By: BAILEY     BSA: 2.0 m2  Height: 66 in  Weight: 194 lb  HR: 69  BP: 119/78 mmHg  ______________________________________________________________________________  Procedure  Limited Portable Bubble Echo Adult.  ______________________________________________________________________________  Interpretation Summary     The current study is a limited study for echo contrast bubble study. Please  see full echocardiogram report from November 23, 2023.  Left ventricular systolic function appears normal. There is mild left  ventricular hypertrophy. Left ventricular ejection fraction is grossly normal  estimated at 60 to 65%.  Normal right ventricular size and systolic function.  Echo contrast bubble study is negative at rest and with Valsalva. No observed  shunt identified.  Mild enlargement of the aortic root.  ______________________________________________________________________________  Left Ventricle  The left ventricle is normal in size. There is mild concentric left  ventricular hypertrophy. Left ventricular systolic function is normal. The  visual ejection fraction is 60-65%. No regional wall motion abnormalities  noted.     Right Ventricle  Normal right ventricle size and systolic function.     Atria  A contrast injection (Bubble Study) was performed that was negative for flow  across the interatrial septum. Lipomatous hypertrophy of the interatrial  septum is noted.     Vessels  The aortic root measures mildly enlarged at approximately 3.8 cm on today's  examination.     ______________________________________________________________________________  Report approved by: King Black 11/25/2023 02:07 PM     ______________________________________________________________________________          Labs:  Echocardiogram Limited with Bubble Study   Final Result      XR Chest 2 Views   Final Result    IMPRESSION: A right basilar opacity likely represents pleural fluid and atelectasis. There is silhouetting of the left hemidiaphragm which is likely a small volume of left pleural fluid. Pleural effusions may have minimally decreased from the comparison    study. Cannot assess for underlying airspace disease. No pneumothorax. The heart is at the upper limits of normal in size. Status post cholecystectomy.       Echocardiogram Complete   Final Result      XR Chest Port 1 View   Final Result   IMPRESSION: New opacity at right lung base suggests a small right pleural effusion. There is also question minimal blunting at left costophrenic angle. Lungs otherwise clear. Heart size normal.        Recent Results (from the past 24 hour(s))   Glucose by meter    Collection Time: 11/25/23 11:51 AM   Result Value Ref Range    GLUCOSE BY METER POCT 218 (H) 70 - 99 mg/dL   Echocardiogram Limited with Bubble Study    Collection Time: 11/25/23 12:55 PM   Result Value Ref Range    LVEF  60-65%    Glucose by meter    Collection Time: 11/25/23  6:00 PM   Result Value Ref Range    GLUCOSE BY METER POCT 306 (H) 70 - 99 mg/dL   Glucose by meter    Collection Time: 11/25/23  9:17 PM   Result Value Ref Range    GLUCOSE BY METER POCT 180 (H) 70 - 99 mg/dL   Glucose by meter    Collection Time: 11/26/23  2:21 AM   Result Value Ref Range    GLUCOSE BY METER POCT 123 (H) 70 - 99 mg/dL   Glucose by meter    Collection Time: 11/26/23  6:53 AM   Result Value Ref Range    GLUCOSE BY METER POCT 118 (H) 70 - 99 mg/dL   Basic metabolic panel    Collection Time: 11/26/23  8:50 AM   Result Value Ref Range    Sodium 139 135 - 145 mmol/L    Potassium 4.1 3.4 - 5.3 mmol/L    Chloride 103 98 - 107 mmol/L    Carbon Dioxide (CO2) 29 22 - 29 mmol/L    Anion Gap 7 7 - 15 mmol/L    Urea Nitrogen 20.4 8.0 - 23.0 mg/dL    Creatinine 0.96 0.67 - 1.17 mg/dL    GFR Estimate 88 >60 mL/min/1.73m2    Calcium 9.0 8.8 - 10.2 mg/dL    Glucose 115 (H) 70 - 99 mg/dL        Pending Labs:  Unresulted Labs Ordered in the Past 30 Days of this Admission       No orders found from 10/20/2023 to 11/20/2023.

## 2023-11-26 NOTE — PLAN OF CARE
"PRIMARY DIAGNOSIS: \"GENERIC\" NURSING  OUTPATIENT/OBSERVATION GOALS TO BE MET BEFORE DISCHARGE:  ADLs back to baseline: Yes    Activity and level of assistance: Ambulating independently.    Pain status: Pain free.    Return to near baseline physical activity: Yes     Discharge Planner Nurse   Safe discharge environment identified: Yes  Barriers to discharge: Yes       Entered by: Levi Santamaria RN 11/26/2023 11:04 AM     Please review provider order for any additional goals.   Nurse to notify provider when observation goals have been met and patient is ready for discharge.      Patient is A/Ox4, VSS on 5L O2. SBA when ambulating. Voiding adequately. Full sensation per Pt. IV saline locked, patent. No new skin issues noted. Patient denying pain during shift. Nicotine patch on the patient's left shoulder. Tolerating a 60g carb diet.         "

## 2023-11-26 NOTE — PLAN OF CARE
Problem: Gas Exchange Impaired  Goal: Optimal Gas Exchange  Outcome: Progressing  Intervention: Optimize Oxygenation and Ventilation  Recent Flowsheet Documentation  Taken 11/25/2023 1645 by Diane Love, RN  Head of Bed (HOB) Positioning: HOB at 20-30 degrees     Problem: Comorbidity Management  Goal: Blood Pressure in Desired Range  Outcome: Progressing  Intervention: Maintain Blood Pressure Management  Recent Flowsheet Documentation  Taken 11/25/2023 1645 by Diane Love, RN  Medication Review/Management: medications reviewed     Pt is A & O x 4 & denies pain during today's shift. CMS intact x 4. Voiding spontaneously. BS present & passing flatus. LS clear but diminished in the all posterior fields. VSS but requiring 5L O2 via NC. Tolerating a 60 gm CHO diet w/ BG monitoring w/ a dinner reading of 306. Up w/ an assist of 1, walker & gait belt. Anticipating discharge pending clinical improvement.     BOB Singletary  Shift: 1500 - 1930

## 2023-11-26 NOTE — PROGRESS NOTES
Pt on O2 4 LPM NC with humidification, SpO2 low 90s. BS clear;diminished pre and post nebs. No SOB reported. Scheduled nebs given. Pt tolerated well. RT following.    Nuno Clark, RT

## 2023-11-26 NOTE — PLAN OF CARE
Problem: Gas Exchange Impaired  Goal: Optimal Gas Exchange  Outcome: Progressing  Intervention: Optimize Oxygenation and Ventilation     Problem: Adult Inpatient Plan of Care  Goal: Optimal Comfort and Wellbeing  Outcome: Progressing  Intervention: Monitor Pain and Promote Comfort     Goal Outcome Evaluation:       Pt is A/Ox4, able to make needs known. Denying pain. Up independent in room. Voiding adequately. VSS, able to wean from 5L to 4. Call light within reach.   L.

## 2023-11-26 NOTE — PROGRESS NOTES
RT Note:    Scheduled neb treatments given per order. Pt on 5L humidified nasal cannula with diminished breath sounds. Pt sitting at the edge of bed watching tv when seen. Will continue to monitor and assess pt.

## 2023-11-26 NOTE — PROGRESS NOTES
Care Management Follow Up    Length of Stay (days): 7    Expected Discharge Date: 11/27/2023     Concerns to be Addressed: discharge planning     Patient plan of care discussed at interdisciplinary rounds: Yes    Anticipated Discharge Disposition: Home     Anticipated Discharge Services: None  Anticipated Discharge DME: None    Patient/family educated on Medicare website which has current facility and service quality ratings: no  Education Provided on the Discharge Plan: No  Patient/Family in Agreement with the Plan: no    Referrals Placed by CM/SW:  None indicated   Private pay costs discussed: Not applicable    Additional Information:  Chart reviewed.  Pt requiring 02. May need home 02 eval.  Pt lives with spouse.  Family to transport when Pt medically ready for discharge.  No CM needs identified.     YUNI Adkins

## 2023-11-26 NOTE — PROGRESS NOTES
PULMONARY / CRITICAL CARE PROGRESS NOTE    Date / Time of Admission:  11/19/2023  7:54 PM    Assessment:     Remi Mathias is a 63 year old male with history HTN, DM and tobacco use 1ppd. 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    Patient presents to Urgent care complaining of shortness of breath and productive cough for one week.   Patient was hypoxic upon arrival mid 80s, afebrile and hemodynamically stable.   Laboratory work showed normal BMP, lactic acid, troponin, D-dimer, normal WBC and diff, negative COVID/influenza, Chest CT with contrast showed emphysema and bronchial thickening, no pulmonary embolism. EKG revealed normal sinus rhythm with right bundle branch block without prior for comparison.    Patient was admitted to  on 11/19 with diagnosis of COPD exacerbation and acute bronchitis. Treated with O2 supplementation, steroids and Abx.   Persistent high O2 requirements, pulmonary service consulted.     Acute hypoxic respiratory failure   Outside CT scan report 11/19, no pulmonary embolism, emphysema and peribronchial thickening. Unfortunately unable to unload images in our systems.   Patient is currently treated for COPD exacerbation and acute bronchitis.   Persistent high O2 requirements , 5 LPM.   Hemodynamically stable. No significant respiratory distress.   SpO2 mildly improves with O2 supplementation. SpO2 improves when lying down. Limited echocardiogram with bubble study was negative.   Plan to repeat chest CT PE protocol.   Further evaluation to rule out AV shunt according to test results.   COPD exacerbation   Significant tobacco use. Emphysematous changes.  Treated for COPD exacerbation  Clinically better, continue steroid taper  PFTs as outpatient. Smoking cessation counseling   Acute bronchitis   HTN  DM  Tobacco use  Obesity Body mass index is 31.34 kg/m .    Advance Directives:  Full code    Plan:   Titrate FiO2 to keep SpO2 > 88%  Bronchodilators as needed  Titrate BP meds  Steroid  taper  Chest CT scan PE protocol   DVT prophylaxis lovenox subcutaneous  Increase activity level     Please contact me if you have any questions.    Rickey Guaman  Pulmonary / Critical Care  11/26/2023  1:14 PM      Subjective:    HPI:  Remi Mathias is a 63 year old male with history HTN, DM and tobacco use 1ppd. 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    Patient presents to Urgent care complaining of shortness of breath and productive cough for one week.   Patient was hypoxic upon arrival mid 80s, afebrile and hemodynamically stable.   Laboratory work showed normal BMP, lactic acid, troponin, D-dimer, normal WBC and diff, negative COVID/influenza, Chest CT with contrast showed emphysema and bronchial thickening, no pulmonary embolism.   EKG revealed normal sinus rhythm with right bundle branch block without prior for comparison.    Patient was admitted to  on 11/19 with diagnosis of COPD exacerbation and acute bronchitis. Treated with O2 supplementation, steroids and Abx.   Persistent high O2 requirements, pulmonary service consulted.     Events overnight  - Echocardiogram with bubble study was negative.   - Persistent high O2 requirements    Allergies: Metformin     MEDS:  Current Facility-Administered Medications   Medication    acetaminophen (TYLENOL) tablet 650 mg    Or    acetaminophen (TYLENOL) Suppository 650 mg    acetylcysteine (MUCOMYST) 20 % nebulizer solution 4 mL    atorvastatin (LIPITOR) tablet 20 mg    benzocaine-menthol (CEPACOL) 15-3.6 MG lozenge 1 lozenge    calcium carbonate (TUMS) chewable tablet 1,000 mg    glucose gel 15-30 g    Or    dextrose 50 % injection 25-50 mL    Or    glucagon injection 1 mg    enoxaparin ANTICOAGULANT (LOVENOX) injection 40 mg    escitalopram (LEXAPRO) tablet 20 mg    guaiFENesin (ROBITUSSIN) 20 mg/mL solution 200 mg    insulin aspart (NovoLOG) injection (RAPID ACTING)    insulin aspart (NovoLOG) injection (RAPID ACTING)    insulin NPH injection  1 Units    ipratropium - albuterol 0.5 mg/2.5 mg/3 mL (DUONEB) neb solution 3 mL    lidocaine (LMX4) cream    lidocaine 1 % 0.1-1 mL    lisinopril (ZESTRIL) tablet 2.5 mg    miconazole (MICATIN) 2 % powder    nicotine (COMMIT) lozenge 4 mg    nicotine (NICODERM CQ) 21 MG/24HR 24 hr patch 1 patch    nicotine Patch in Place    ondansetron (ZOFRAN ODT) ODT tab 4 mg    Or    ondansetron (ZOFRAN) injection 4 mg    predniSONE (DELTASONE) tablet 30 mg    Followed by    [START ON 11/29/2023] predniSONE (DELTASONE) tablet 20 mg    Followed by    [START ON 12/2/2023] predniSONE (DELTASONE) tablet 10 mg    senna-docusate (SENOKOT-S/PERICOLACE) 8.6-50 MG per tablet 1 tablet    Or    senna-docusate (SENOKOT-S/PERICOLACE) 8.6-50 MG per tablet 2 tablet    sodium chloride (PF) 0.9% PF flush 3 mL    sodium chloride (PF) 0.9% PF flush 3 mL     Objective:   VITALS:  /70 (BP Location: Right arm)   Pulse 61   Temp 98.1  F (36.7  C) (Oral)   Resp 20   Wt 88.1 kg (194 lb 3.2 oz)   SpO2 91%   BMI 31.34 kg/m    VENT:  Resp: 20    EXAM:   Gen: obese, awake, alert, no distress  HEENT: pink conjunctiva, moist mucosa, Mallampati III/IV  Neck: no thyromegaly, masses or JVD  Lungs: decrease breath sounds at the bases otherwise clear  CV: regular, no murmurs or gallops appreciated  Abdomen: soft, NT, BS wnl  Ext: no edema  Neuro: CN II-XII intact, non focal       Data Review:  Recent Labs   Lab 11/26/23  0850 11/26/23  0653 11/26/23  0221 11/25/23  2117 11/25/23  1800 11/25/23  1151   * 118* 123* 180* 306* 218*      11/26/23 08:50   Sodium 139   Potassium 4.1   Chloride 103   Carbon Dioxide (CO2) 29   Urea Nitrogen 20.4   Creatinine 0.96   GFR Estimate 88   Calcium 9.0   Anion Gap 7   Glucose 115 (H)      11/24/23 05:03   WBC 11.1 (H)   Hemoglobin 16.1   Hematocrit 48.9   Platelet Count 158   RBC Count 5.14   MCV 95   MCH 31.3   MCHC 32.9   RDW 13.5     XR CHEST 2 VIEWS  LOCATION: [facility]  DATE:  11/24/2023   INDICATION: worsening hypoxia, and 5 day comparison.   COMPARISON:  11/22/2023 and 11/19/2023.   IMPRESSION: A right basilar opacity likely represents pleural fluid and atelectasis. There is silhouetting of the left hemidiaphragm which is likely a small volume of left pleural fluid. Pleural effusions may have minimally decreased from the comparison   study. Cannot assess for underlying airspace disease. No pneumothorax. The heart is at the upper limits of normal in size. Status post cholecystectomy    Chest CT scan with IV contrast 11/19/2023  Impression   1.  Mild bronchial wall thickening and scattered mucus plugging.   2.  Mild upper lung predominant paraseptal emphysema.     Echocardiogram 11/23/2023  Normal left ventricular size. Mild concentric left ventricular hypertrophy.  Left ventricular systolic function is normal. The left ventricular ejection  fraction is estimated at 55 to 60%. No regional wall motion abnormality  noted.Diastolic Doppler findings (E/E' ratio and/or other parameters) suggest  left ventricular filling pressures are normal.  Mild right ventricular enlargement. Normal right ventricular systolic function.  Mild right atrial enlargement.  No hemodynamically significant valve abnormality.  The aortic root measures mildly enlarged at 4.0 cm.  IVC diameter <2.1 cm collapsing >50% with sniff suggests a normal RA pressure of 3 mmHg.    Limited Portable Bubble Echo Adult. 11/25     The current study is a limited study for echo contrast bubble study. Please  see full echocardiogram report from November 23, 2023.  Left ventricular systolic function appears normal. There is mild left  ventricular hypertrophy. Left ventricular ejection fraction is grossly normal  estimated at 60 to 65%.  Normal right ventricular size and systolic function.  Echo contrast bubble study is negative at rest and with Valsalva. No observed  shunt identified.  Mild enlargement of the aortic root.    By:  Rickey SHER  Yoni Guaman MD, 11/26/2023  1:14 PM    Primary Care Physician:  Rosie Dorado

## 2023-11-27 VITALS
WEIGHT: 197.6 LBS | TEMPERATURE: 97.5 F | RESPIRATION RATE: 18 BRPM | SYSTOLIC BLOOD PRESSURE: 129 MMHG | OXYGEN SATURATION: 93 % | BODY MASS INDEX: 31.89 KG/M2 | HEART RATE: 64 BPM | DIASTOLIC BLOOD PRESSURE: 76 MMHG

## 2023-11-27 LAB
ANION GAP SERPL CALCULATED.3IONS-SCNC: 9 MMOL/L (ref 7–15)
BUN SERPL-MCNC: 18.3 MG/DL (ref 8–23)
CALCIUM SERPL-MCNC: 8.4 MG/DL (ref 8.8–10.2)
CHLORIDE SERPL-SCNC: 103 MMOL/L (ref 98–107)
CREAT SERPL-MCNC: 0.81 MG/DL (ref 0.67–1.17)
DEPRECATED HCO3 PLAS-SCNC: 26 MMOL/L (ref 22–29)
EGFRCR SERPLBLD CKD-EPI 2021: >90 ML/MIN/1.73M2
GLUCOSE BLDC GLUCOMTR-MCNC: 121 MG/DL (ref 70–99)
GLUCOSE BLDC GLUCOMTR-MCNC: 122 MG/DL (ref 70–99)
GLUCOSE BLDC GLUCOMTR-MCNC: 164 MG/DL (ref 70–99)
GLUCOSE SERPL-MCNC: 108 MG/DL (ref 70–99)
POTASSIUM SERPL-SCNC: 3.9 MMOL/L (ref 3.4–5.3)
SODIUM SERPL-SCNC: 138 MMOL/L (ref 135–145)

## 2023-11-27 PROCEDURE — 36415 COLL VENOUS BLD VENIPUNCTURE: CPT | Performed by: STUDENT IN AN ORGANIZED HEALTH CARE EDUCATION/TRAINING PROGRAM

## 2023-11-27 PROCEDURE — 94640 AIRWAY INHALATION TREATMENT: CPT

## 2023-11-27 PROCEDURE — 250N000009 HC RX 250: Performed by: HOSPITALIST

## 2023-11-27 PROCEDURE — 250N000013 HC RX MED GY IP 250 OP 250 PS 637: Performed by: HOSPITALIST

## 2023-11-27 PROCEDURE — 999N000157 HC STATISTIC RCP TIME EA 10 MIN

## 2023-11-27 PROCEDURE — 99239 HOSP IP/OBS DSCHRG MGMT >30: CPT | Performed by: STUDENT IN AN ORGANIZED HEALTH CARE EDUCATION/TRAINING PROGRAM

## 2023-11-27 PROCEDURE — 250N000012 HC RX MED GY IP 250 OP 636 PS 637: Performed by: INTERNAL MEDICINE

## 2023-11-27 PROCEDURE — 82310 ASSAY OF CALCIUM: CPT | Performed by: STUDENT IN AN ORGANIZED HEALTH CARE EDUCATION/TRAINING PROGRAM

## 2023-11-27 RX ORDER — PREDNISONE 10 MG/1
TABLET ORAL
Qty: 24 TABLET | Refills: 0 | Status: SHIPPED | OUTPATIENT
Start: 2023-11-27 | End: 2023-12-09

## 2023-11-27 RX ADMIN — NICOTINE 1 PATCH: 21 PATCH, EXTENDED RELEASE TRANSDERMAL at 08:49

## 2023-11-27 RX ADMIN — IPRATROPIUM BROMIDE AND ALBUTEROL SULFATE 3 ML: .5; 3 SOLUTION RESPIRATORY (INHALATION) at 07:47

## 2023-11-27 RX ADMIN — ESCITALOPRAM OXALATE 20 MG: 10 TABLET ORAL at 08:51

## 2023-11-27 RX ADMIN — PREDNISONE 30 MG: 5 TABLET ORAL at 08:46

## 2023-11-27 RX ADMIN — LISINOPRIL 2.5 MG: 2.5 TABLET ORAL at 08:48

## 2023-11-27 RX ADMIN — INSULIN ASPART 1 UNITS: 100 INJECTION, SOLUTION INTRAVENOUS; SUBCUTANEOUS at 12:46

## 2023-11-27 RX ADMIN — ACETYLCYSTEINE 4 ML: 200 SOLUTION ORAL; RESPIRATORY (INHALATION) at 07:52

## 2023-11-27 ASSESSMENT — ACTIVITIES OF DAILY LIVING (ADL)
ADLS_ACUITY_SCORE: 20

## 2023-11-27 NOTE — PROGRESS NOTES
Patient is A & O x 4. Independent in the room. VSS, on 4L O2. Pt denies pain. Voiding without difficulty. R PIV running saline locked. No scales or drains. Tolerating a 60h carb count diet.

## 2023-11-27 NOTE — DISCHARGE SUMMARY
[facility]  Hospitalist Discharge Summary      Date of Admission:  11/19/2023  Date of Discharge:  11/27/2023  Discharging Provider: Brady Aparicio MD  Discharge Service: Hospitalist Service    Discharge Diagnoses   Acute respiratory failure with hypoxia  COPD exacerbation  empiric community acquired pneumonia treatment    Acute bronchitis  Tobacco use- counseled for complete cessation     Clinically Significant Risk Factors     # DMII: A1C = 6.6 % (Ref range: <5.7 %) within past 6 months       Follow-ups Needed After Discharge   Follow-up Appointments     Follow-up and recommended labs and tests       Follow up with primary care provider, Rosie Dorado, within 7 days for   hospital follow- up. Follow up on home 02 and prednisone taper (30mg, then   decrease by 10mg every 4 days).             Unresulted Labs Ordered in the Past 30 Days of this Admission       No orders found from 10/20/2023 to 11/20/2023.        These results will be followed up by [facility]    Discharge Disposition   Discharged to home  Condition at discharge: [facility]      Remi Mathias  is a 63 year old male admitted with hypoxic respiratory failure in the setting of COPD exacerbation, treated for CAP empirically and checked negative for PE, viral illness, or other acute pathologies. He was treated with duonebs and steroids.      CT chest on admission with mild bronchial wall thickening and scattered mucus plugging and emphysema background.  No PE was seen; CXR on 22nd suggested new opacity at right lung base suggests a small right pleural effusion, covered with CAP abx given minimal blunting at left costophrenic angle. Hypoxia was persistent and 02 needs progressed from 3L to 5L but now relatively stable over weekend. Repeated viral swab (first study invalid 2/2 lab error) was negative, re-imaging CXR for interval comparison was stable, and non-elevated BNP. Nonremarkable workup.     Pt completed treatment  with empiric CAP abx. Pulmonary Medicine was consulted 11/22 (the Thanksgiving holiday led to a logistical delay). Given platypnea, an echocardiogram w/ bubble study was obtained which did not demonstrate a shunt. Pt was clinically stable and discussed need for home 02; he underwent a walking test and required 3L of 02 at rest and up to 6L with exertion per RN walking test 11/27/2023. Pulmonary medicine started a prednisone taper upon discharge of 30mg daily prednisone and every 4 days reducing by 10mg. Home oxygen arranged.    Pt will be discharged to back home and remained vitally and hemodynamically intact and instructed to follow up with his PCP for follow up and in the future likely repeat ambulation test as he is hoping to be weaned off of 02 in the future; counseled multiple times for tobacco cessation which he plans to pursue after hospitalization.     Consultations This Hospital Stay   IP RESPIRATORY CARE CHRONIC PULMONARY DISEASE SPECIALIST  PULMONARY IP CONSULT  IP RESPIRATORY CARE CHRONIC PULMONARY DISEASE SPECIALIST    Code Status   Full Code    Time Spent on this Encounter   I, Brady Aparicio MD, personally saw the patient today and spent greater than 30 minutes discharging this patient.       Brady pAaricio MD  [address] [address]  Phone: [phone]  Fax: [phone]  ______________________________________________________________________    Physical Exam   Vital Signs: Temp: 97.5  F (36.4  C) Temp src: Oral BP: 129/76 Pulse: 64   Resp: 16 SpO2: 91 % O2 Device: Nasal cannula with humidification Oxygen Delivery: 4 LPM  Weight: 197 lbs 9.6 oz    General: alert, oriented, and in no acute distress  Pulmonary: coarse breath sounds, decreased at bases, on 3L NC and speaking comfortably   CVS: regular rate and rhythm, no murmurs, rubs, or gallops; no blatant jugular venous distention; no extremity edema and extremities are warm to  the touch  GI: soft, nontender, BS+, no rebound or guarding, no conspicuous organomegaly   Neuro: nonfocal, moves all extremities of own volition  Psych: appropriate         Primary Care Physician   Rosie Dorado    Discharge Orders      Reason for your hospital stay    COPD exacerbation, treated for possible overlying pneumonia and had several negative tests including for lung clots of a hole in the heart or viruses; you are discharged with home oxygen; this can be 're-tested' in the future with your primary provider. Continue the prednisone taper as recommended by the Lung physician (decrease by 10mg every 4 days).     Follow-up and recommended labs and tests     Follow up with primary care provider, Rosie Dorado, within 7 days for hospital follow- up. Follow up on home 02 and prednisone taper (30mg, then decrease by 10mg every 4 days).     Activity    Your activity upon discharge: activity as tolerated     Home Oxygen Order for DME - ONLY FOR DME    I, the undersigned, certify that the above prescribed supplies are medically necessary for this patient and is both reasonable and necessary in reference to accepted standards of medical and necessary in reference to accepted standards of medical practice in the treatment of this patient's condition and is not prescribed as a convenience.      Diet    Follow this diet upon discharge: Orders Placed This Encounter      Moderate Consistent Carb (60 g CHO per Meal) Diet       Significant Results and Procedures   Results for orders placed or performed during the hospital encounter of 11/19/23   XR Chest Port 1 View    Narrative    EXAM: XR CHEST PORT 1 VIEW  LOCATION: [facility]  DATE: 11/22/2023    INDICATION: persistent worsening hypoxia in setting of copd  COMPARISON: 11/19/2023      Impression    IMPRESSION: New opacity at right lung base suggests a small right pleural effusion. There is also question minimal blunting at left costophrenic angle.  Lungs otherwise clear. Heart size normal.   XR Chest 2 Views    Narrative    EXAM: XR CHEST 2 VIEWS  LOCATION: [facility]  DATE: 2023    INDICATION: worsening hypoxia, and 5 day comparison.   COMPARISON:  2023 and 2023.       Impression    IMPRESSION: A right basilar opacity likely represents pleural fluid and atelectasis. There is silhouetting of the left hemidiaphragm which is likely a small volume of left pleural fluid. Pleural effusions may have minimally decreased from the comparison   study. Cannot assess for underlying airspace disease. No pneumothorax. The heart is at the upper limits of normal in size. Status post cholecystectomy.    CT Chest Pulmonary Embolism w Contrast    Narrative    EXAM: CT CHEST PULMONARY EMBOLISM W CONTRAST  LOCATION: [facility]  DATE: 2023    INDICATION: Persistent high O2 requirements, treated for COPD exacerbation  COMPARISON: None.  TECHNIQUE: CT chest pulmonary angiogram during arterial phase injection of IV contrast. Multiplanar reformats and MIP reconstructions were performed. Dose reduction techniques were used.   CONTRAST: 75ml Isovue 370    FINDINGS:  ANGIOGRAM CHEST: Pulmonary arteries are normal caliber and negative for pulmonary emboli. Thoracic aorta is negative for dissection. No CT evidence of right heart strain.    LUNGS AND PLEURA: Bibasilar atelectasis. No focal consolidation or effusion.    MEDIASTINUM/AXILLAE: Heart is normal in size. No adenopathy.    CORONARY ARTERY CALCIFICATION: Mild.    UPPER ABDOMEN: Normal.    MUSCULOSKELETAL: Degenerative changes of the spine.      Impression    IMPRESSION:  1.  No pulmonary embolism.   Echocardiogram Complete     Value    LVEF  55-60%    Narrative    [id number]  [id number]  [id number]  [id number]^[place]IDA^[place] [address]     Name: KARLA GRIMES  MRN: [id number]  : 1960  Study Date:  11/23/2023 12:02 PM  Age: 63 yrs  Gender: Male  Patient Location: [facility]  Reason For Study: Respiratory Failure  Ordering Physician: ANGEL PARRA  Referring Physician: SYSTEM, PROVIDER NOT IN  Performed By: BAILEY     BSA: 2.0 m2  Height: 66 in  Weight: 194 lb  HR: 62  BP: 105/59 mmHg  ______________________________________________________________________________  Procedure  Complete Portable Echo Adult.  ______________________________________________________________________________  Interpretation Summary     Normal left ventricular size. Mild concentric left ventricular hypertrophy.  Left ventricular systolic function is normal. The left ventricular ejection  fraction is estimated at 55 to 60%. No regional wall motion abnormality  noted.Diastolic Doppler findings (E/E' ratio and/or other parameters) suggest  left ventricular filling pressures are normal.  Mild right ventricular enlargement. Normal right ventricular systolic  function.  Mild right atrial enlargement.  No hemodynamically significant valve abnormality.  The aortic root measures mildly enlarged at 4.0 cm.  IVC diameter <2.1 cm collapsing >50% with sniff suggests a normal RA pressure  of 3 mmHg.  ______________________________________________________________________________  I      WMSI = 1.00     % Normal = 100     X - Cannot   0 -                      (2) - Mildly 2 -          Segments  Size  Interpret    Hyperkinetic 1 - Normal  Hypokinetic  Hypokinetic  1-2     small                                                     7 -          3-5      moderate  3 - Akinetic 4 -          5 -         6 - Akinetic Dyskinetic   6-14    large               Dyskinetic   Aneurysmal  w/scar       w/scar       15-16   diffuse     Left Ventricle  The left ventricle is normal in size. There is mild concentric left  ventricular hypertrophy. Diastolic Doppler findings (E/E' ratio and/or other  parameters) suggest left ventricular filling pressures are normal. The  visual  ejection fraction is 55-60%. No regional wall motion abnormalities noted.     Right Ventricle  The right ventricle is mildly dilated. The right ventricular systolic function  is normal.     Atria  Normal left atrial size. The right atrium is mildly dilated.     Mitral Valve  Mitral valve leaflets appear normal. There is trace mitral regurgitation.     Tricuspid Valve  The tricuspid valve is not well visualized, but is grossly normal. There is  trace tricuspid regurgitation.     Aortic Valve  The aortic valve is trileaflet. No hemodynamically significant valvular aortic  stenosis.     Pulmonic Valve  The pulmonic valve is not well seen, but is grossly normal. There is trace  pulmonic valvular regurgitation.     Vessels  Aortic root measures mildly enlarged at 4.0 cm. The proximal ascending aorta  measures borderline enlarged at 3.7 cm. IVC diameter <2.1 cm collapsing >50%  with sniff suggests a normal RA pressure of 3 mmHg.     Pericardium  There is no pericardial effusion.     ______________________________________________________________________________  MMode/2D Measurements & Calculations  IVSd: 1.2 cm  LVIDd: 4.9 cm  LVIDs: 3.2 cm  LVPWd: 1.2 cm  FS: 34.6 %  LV mass(C)d: 237.4 grams  LV mass(C)dI: 120.3 grams/m2  Ao root diam: 4.0 cm  LA dimension: 4.3 cm  asc Aorta Diam: 3.7 cm  LA/Ao: 1.1  LVOT diam: 2.0 cm  LVOT area: 3.1 cm2     Ao root diam index Ht(cm/m): 2.4  Ao root diam index BSA (cm/m2): 2.0  Asc Ao diam index BSA (cm/m2): 1.9  Asc Ao diam index Ht(cm/m): 2.2  LA Volume Indexed (AL/bp): 21.5 ml/m2  RV Base: 3.8 cm  RWT: 0.50  TAPSE: 2.5 cm     Time Measurements  Aortic HR: 68.0 BPM  MM HR: 66.0 BPM     Doppler Measurements & Calculations  MV E max chan: 62.9 cm/sec  MV A max chan: 68.7 cm/sec  MV E/A: 0.92  MV dec slope: 244.0 cm/sec2  MV dec time: 0.26 sec  Ao V2 max: 149.0 cm/sec  Ao max P.0 mmHg  Ao V2 mean: 101.0 cm/sec  Ao mean P.0 mmHg  Ao V2 VTI: 29.7 cm  SHARON(I,D): 2.3  cm2  SHARON(V,D): 2.2 cm2  LV V1 max P.5 mmHg  LV V1 max: 106.0 cm/sec  LV V1 VTI: 22.0 cm  CO(LVOT): 4.7 l/min  CI(LVOT): 2.4 l/min/m2  SV(LVOT): 69.1 ml  SI(LVOT): 35.0 ml/m2  PA acc time: 0.14 sec  AV Roger Ratio (DI): 0.71  SHARON Index (cm2/m2): 1.2  E/E': 7.9  E/E' av.2  Lateral E/e': 6.6  Medial E/e': 9.8     Peak E' Roger: 8.0 cm/sec  RV S Roger: 14.7 cm/sec     ______________________________________________________________________________  Report approved by: King Black 2023 01:06 PM         Echocardiogram Limited with Bubble Study     Value    LVEF  60-65%    Narrative    [id number]  [id number]  [id number]^[place] [address]     Name: KARLA GRIMES  MRN: [id number]  : 1960  Study Date: 2023 12:35 PM  Age: 63 yrs  Gender: Male  Patient Location: [facility]  Reason For Study: ASD  Ordering Physician: LOUIS DUTTON  Referring Physician: SYSTEM, PROVIDER NOT IN  Performed By: BAILEY     BSA: 2.0 m2  Height: 66 in  Weight: 194 lb  HR: 69  BP: 119/78 mmHg  ______________________________________________________________________________  Procedure  Limited Portable Bubble Echo Adult.  ______________________________________________________________________________  Interpretation Summary     The current study is a limited study for echo contrast bubble study. Please  see full echocardiogram report from 2023.  Left ventricular systolic function appears normal. There is mild left  ventricular hypertrophy. Left ventricular ejection fraction is grossly normal  estimated at 60 to 65%.  Normal right ventricular size and systolic function.  Echo contrast bubble study is negative at rest and with Valsalva. No observed  shunt identified.  Mild enlargement of the aortic root.  ______________________________________________________________________________  Left Ventricle  The left ventricle is normal in  size. There is mild concentric left  ventricular hypertrophy. Left ventricular systolic function is normal. The  visual ejection fraction is 60-65%. No regional wall motion abnormalities  noted.     Right Ventricle  Normal right ventricle size and systolic function.     Atria  A contrast injection (Bubble Study) was performed that was negative for flow  across the interatrial septum. Lipomatous hypertrophy of the interatrial  septum is noted.     Vessels  The aortic root measures mildly enlarged at approximately 3.8 cm on today's  examination.     ______________________________________________________________________________  Report approved by: King Black 11/25/2023 02:07 PM     ______________________________________________________________________________          Discharge Medications   Current Discharge Medication List        START taking these medications    Details   predniSONE (DELTASONE) 10 MG tablet Take 3 tablets (30 mg) by mouth daily for 4 days, THEN 2 tablets (20 mg) daily for 4 days, THEN 1 tablet (10 mg) daily for 4 days.  Qty: 24 tablet, Refills: 0    Associated Diagnoses: COPD exacerbation (H)           CONTINUE these medications which have NOT CHANGED    Details   atorvastatin (LIPITOR) 20 MG tablet Take 1 tablet by mouth once daily  Qty: 90 tablet, Refills: 1    Associated Diagnoses: Type 2 diabetes mellitus with microalbuminuria, without long-term current use of insulin (H); Hyperlipidemia, unspecified hyperlipidemia type      escitalopram (LEXAPRO) 20 MG tablet Take 1 tablet by mouth once daily  Qty: 90 tablet, Refills: 2    Associated Diagnoses: Anxiety state      glipiZIDE (GLUCOTROL XL) 10 MG 24 hr tablet Take 2 tablets by mouth once daily  Qty: 180 tablet, Refills: 0    Associated Diagnoses: Type 2 diabetes mellitus with microalbuminuria, without long-term current use of insulin (H)      lisinopril (ZESTRIL) 2.5 MG tablet Take 1 tablet by mouth once daily  Qty: 90 tablet, Refills: 3     Associated Diagnoses: Type 2 diabetes mellitus with microalbuminuria, without long-term current use of insulin (H)      LORazepam (ATIVAN) 0.5 MG tablet Take 1 tablet (0.5 mg) by mouth daily as needed for anxiety  Qty: 15 tablet, Refills: 0    Associated Diagnoses: Anxiety state      nicotine (NICODERM CQ) 21 MG/24HR 24 hr patch Place 1 patch onto the skin every 24 hours  Qty: 28 patch, Refills: 0    Associated Diagnoses: Tobacco use disorder      TRULICITY 1.5 MG/0.5ML pen INJECT 1.5MG SUBCUTANEOUSLY ONCE A WEEK  Qty: 4 mL, Refills: 0    Associated Diagnoses: Type 2 diabetes mellitus with microalbuminuria, without long-term current use of insulin (H)      Glucose Blood (BLOOD GLUCOSE TEST STRIPS) STRP Test blood sugar once daily  Qty: 100 strip, Refills: 11    Associated Diagnoses: Type 2 diabetes mellitus with microalbuminuria, without long-term current use of insulin (H)      nicotine (NICODERM CQ) 14 MG/24HR 24 hr patch Place 1 patch onto the skin every 24 hours  Qty: 28 patch, Refills: 0    Associated Diagnoses: Tobacco use disorder      nicotine (NICODERM CQ) 7 MG/24HR 24 hr patch Place 1 patch onto the skin every 24 hours  Qty: 28 patch, Refills: 0    Associated Diagnoses: Tobacco use disorder           Allergies   Allergies   Allergen Reactions    Metformin Diarrhea     XR and IR both cause diarrhea

## 2023-11-27 NOTE — PROGRESS NOTES
I certify that this patient, Remi Mathias has been under my care (or a nurse practitioner or physican's assistant working with me). This is the face-to-face encounter for oxygen medical necessity.      At the time of this encounter supplemental oxygen is reasonable and necessary and is expected to improve the patient's condition in a home setting.       Patient has continued oxygen desaturation due to Chronic Respiratory Failure with Hypoxia J96.11  COPD J44.9  Pneumonia J18.9.    If portability is ordered, is the patient mobile within the home? Yes    Brady Aparicio   [facility]   Securely message with the Vocera Web Console (learn more here)  Text page via [employer] Paging/Directory

## 2023-11-27 NOTE — PROGRESS NOTES
Patient discharged with friend/family to transport to home. Portable oxygen 3LPM-6LPM utilized via DME orders with pt at discharge and to be delivered to home at arrival. PIV access removed prior to discharge. Belongings remain with pt at discharge. AVS reviewed with pt, questions answered, education complete.

## 2023-11-27 NOTE — PROGRESS NOTES
Care Management Discharge Note    Discharge Date: 11/27/2023       Discharge Disposition: Home    Discharge Services: None    Discharge DME: O2    Discharge Transportation: family/friend      Education Provided on the Discharge Plan: yes, per bedside RN  Persons Notified of Discharge Plans: Pt  Patient/Family in Agreement with the Plan: no    Handoff Referral Completed: Yes    Additional Information:  10:34 AM  Pt medically ready to discharge home with new home O2.  Bedside RN to coordinate this.  Anticipate family/friend transport.    YENY Smith

## 2023-11-27 NOTE — PROGRESS NOTES
Patient has been assessed for Home Oxygen needs.     Pulse oximetry (SpO2) and Oxygen flow readings:    SpO2 = 88% from 0925- 0930 on room air at rest while awake.    SpO2 improved to  93% on  4 liters/minute at rest.    SpO2 = 6 L of O2% on room air during activity/with exercise.    *SpO2 improved to 90%% on 6 L of O2 liters/minute during activity/with exercise.

## 2023-11-27 NOTE — PLAN OF CARE
Problem: Gas Exchange Impaired  Goal: Optimal Gas Exchange  Intervention: Optimize Oxygenation and Ventilation  Goal Outcome Evaluation:  Patient is alert and oriented X 4. Denied pain. Up to the bathroom independently and voided. IV saline locked. VSS on 4L via NC. Call light within reach.

## 2023-11-27 NOTE — PLAN OF CARE
Goal Outcome Evaluation:      Plan of Care Reviewed With: patient    Overall Patient Progress: improvingOverall Patient Progress: improving    Outcome Evaluation: Pt alert and oriented x 4. Home O2 eval complete (see note). Pulm verified that he is ok to discharge with outpatient follow up. They will be in contact with him. Plan to discharge home with his wife with home O2.      Problem: Gas Exchange Impaired  Goal: Optimal Gas Exchange  Outcome: Adequate for Care Transition  Intervention: Optimize Oxygenation and Ventilation  Recent Flowsheet Documentation  Taken 11/27/2023 0855 by Tayo Strickland, RN  Head of Bed (HOB) Positioning: HOB at 30-45 degrees     Tayo Strickland RN, ONC

## 2023-11-28 ENCOUNTER — TELEPHONE (OUTPATIENT)
Dept: PULMONOLOGY | Facility: CLINIC | Age: 63
End: 2023-11-28

## 2023-11-28 ENCOUNTER — PATIENT OUTREACH (OUTPATIENT)
Dept: CARE COORDINATION | Facility: CLINIC | Age: 63
End: 2023-11-28
Payer: COMMERCIAL

## 2023-11-28 DIAGNOSIS — J44.9 COPD (CHRONIC OBSTRUCTIVE PULMONARY DISEASE) (H): Primary | ICD-10-CM

## 2023-11-28 NOTE — PROGRESS NOTES
Clinic Care Coordination Contact  [facility]: Post-Discharge Note  SITUATION                                                      Admission:    Admission Date: 11/19/23   Reason for Admission: breathing problems  Discharge:   Discharge Date: 11/27/23  Discharge Diagnosis: Acute respiratory failure with hypoxia  COPD exacerbation  empiric community acquired pneumonia treatment    Acute bronchitis  Tobacco use- counseled for complete cessation    BACKGROUND                                                      Per hospital discharge summary and inpatient provider notes:  [facility]  Hospitalist Discharge Summary       Date of Admission:  11/19/2023  Date of Discharge:  11/27/2023  Discharging Provider: Brady Aparicio MD  Discharge Service: Hospitalist Service        Discharge Diagnoses  Acute respiratory failure with hypoxia  COPD exacerbation  empiric community acquired pneumonia treatment    Acute bronchitis  Tobacco use- counseled for complete cessation      Clinically Significant Risk Factors      # DMII: A1C = 6.6 % (Ref range: <5.7 %) within past 6 months       Follow-ups Needed After Discharge  Follow-up Appointments     Follow-up and recommended labs and tests       Follow up with primary care provider, Rosie Dorado, within 7 days for   hospital follow- up. Follow up on home 02 and prednisone taper (30mg, then   decrease by 10mg every 4 days).        ASSESSMENT           Discharge Assessment  How are you doing now that you are home?: good  How are your symptoms? (Red Flag symptoms escalate to triage hotline per guidelines): Improved  Do you feel your condition is stable enough to be safe at home until your provider visit?: Yes  Does the patient have their discharge instructions? : Yes  Does the patient have questions regarding their discharge instructions? : No  Were you started on any new medications or were there changes to any of your previous medications? : Yes  Does the  patient have all of their medications?: Yes  Do you have questions regarding any of your medications? : No  Do you have all of your needed medical supplies or equipment (DME)?  (i.e. oxygen tank, CPAP, cane, etc.): Yes  Discharge follow-up appointment scheduled within 14 calendar days? : Yes  Discharge Follow Up Appointment Date: 12/05/23  Discharge Follow Up Appointment Scheduled with?: Primary Care Provider         Post-op (Clinicians Only)  Did the patient have surgery or a procedure: No  Fever: No  Chills: No  Eating & Drinking: eating and drinking without complaints/concerns  PO Intake: regular diet  Bowel Function: normal  Urinary Status: voiding without complaint/concerns    Doing well, continues to use oxygen. 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  Has income from sick days and will go there tomorrow to discuss with supervisor how to use them.  Thinks he can wait until after his PCP to discuss returning to work. Has support from wife and daughter. Continues to use nicotine patch and is not smoking.      PLAN                                                      Outpatient Plan:  Will attend appts as scheduled.  Will mail letter as he needs to reset his My Chart and will do so at his next appt.     Future Appointments   Date Time Provider Department Center   12/5/2023 11:30 AM [facility]         For any urgent concerns, please contact our 24 hour nurse triage line: [phone] ([phone])         YUNI Moreno                [facility]/Voicemail    Clinical Data: Care Coordinator Outreach    Outreach Documentation Number of Outreach Attempt   11/28/2023  10:21 AM 1       Left message on patient's voicemail with call back information and requested return call.    Plan: Care Coordinator will try to reach patient again in 1-2 business days.    Monica Weiner,   [facility]  [phone]

## 2023-11-28 NOTE — TELEPHONE ENCOUNTER
----- Message from Kayli Rome MD sent at 11/27/2023 10:09 AM CST -----  Jocelyn,   This gentleman is a hospital followup and should be set up with Diane in 4 weeks with PFT's.   Diane, he does not have an outpatient pulmonlogist and is on a slow steroid taper after an exacerbation. JANE saw him in the hospital but he seems appropriate for you to .  Thanks!  -S

## 2023-11-28 NOTE — LETTER
[facility] CARE COORDINATION  [facility]    November 29, 2023    Remi Mathias  [address] [place] [address]      Dear Remi,    I am a clinic care coordinator who works with Rosie Dorado NP with the [facility]. I wanted to thank you for spending the time to talk with me.  Below is a description of clinic care coordination and how I can further assist you.       The clinic care coordination team is made up of a registered nurse, , financial resource worker and community health worker who understand the health care system. The goal of clinic care coordination is to help you manage your health and improve access to the health care system. Our team works alongside your provider to assist you in determining your health and social needs. We can help you obtain health care and community resources, providing you with necessary information and education. We can work with you through any barriers and develop a care plan that helps coordinate and strengthen the communication between you and your care team.  Our services are voluntary and are offered without charge to you personally.    Please feel free to contact me with any questions or concerns regarding care coordination and what we can offer.      We are focused on providing you with the highest-quality healthcare experience possible.    Sincerely,     Monica Weiner,   [facility]  [phone]

## 2023-11-29 DIAGNOSIS — R80.9 TYPE 2 DIABETES MELLITUS WITH MICROALBUMINURIA, WITHOUT LONG-TERM CURRENT USE OF INSULIN (H): ICD-10-CM

## 2023-11-29 DIAGNOSIS — E11.29 TYPE 2 DIABETES MELLITUS WITH MICROALBUMINURIA, WITHOUT LONG-TERM CURRENT USE OF INSULIN (H): ICD-10-CM

## 2023-11-29 RX ORDER — DULAGLUTIDE 1.5 MG/.5ML
INJECTION, SOLUTION SUBCUTANEOUS
Qty: 4 ML | Refills: 0 | Status: SHIPPED | OUTPATIENT
Start: 2023-11-29 | End: 2023-12-05

## 2023-11-30 DIAGNOSIS — J44.9 CHRONIC OBSTRUCTIVE PULMONARY DISEASE, UNSPECIFIED COPD TYPE (H): Primary | ICD-10-CM

## 2023-12-05 ENCOUNTER — TELEPHONE (OUTPATIENT)
Dept: PULMONOLOGY | Facility: CLINIC | Age: 63
End: 2023-12-05

## 2023-12-05 ENCOUNTER — OFFICE VISIT (OUTPATIENT)
Dept: INTERNAL MEDICINE | Facility: CLINIC | Age: 63
End: 2023-12-05
Payer: COMMERCIAL

## 2023-12-05 VITALS
WEIGHT: 203.6 LBS | RESPIRATION RATE: 20 BRPM | HEART RATE: 68 BPM | HEIGHT: 67 IN | SYSTOLIC BLOOD PRESSURE: 128 MMHG | DIASTOLIC BLOOD PRESSURE: 76 MMHG | OXYGEN SATURATION: 97 % | BODY MASS INDEX: 31.96 KG/M2 | TEMPERATURE: 98.3 F

## 2023-12-05 DIAGNOSIS — R80.9 TYPE 2 DIABETES MELLITUS WITH MICROALBUMINURIA, WITHOUT LONG-TERM CURRENT USE OF INSULIN (H): ICD-10-CM

## 2023-12-05 DIAGNOSIS — E11.29 TYPE 2 DIABETES MELLITUS WITH MICROALBUMINURIA, WITHOUT LONG-TERM CURRENT USE OF INSULIN (H): ICD-10-CM

## 2023-12-05 DIAGNOSIS — F17.200 TOBACCO USE DISORDER: ICD-10-CM

## 2023-12-05 DIAGNOSIS — J96.01 ACUTE RESPIRATORY FAILURE WITH HYPOXIA (H): ICD-10-CM

## 2023-12-05 DIAGNOSIS — Z09 HOSPITAL DISCHARGE FOLLOW-UP: Primary | ICD-10-CM

## 2023-12-05 PROCEDURE — 99495 TRANSJ CARE MGMT MOD F2F 14D: CPT | Performed by: NURSE PRACTITIONER

## 2023-12-05 RX ORDER — DULAGLUTIDE 1.5 MG/.5ML
1.5 INJECTION, SOLUTION SUBCUTANEOUS
Qty: 2 ML | Refills: 5 | Status: ON HOLD | OUTPATIENT
Start: 2023-12-05 | End: 2024-04-11

## 2023-12-05 NOTE — LETTER
December 5, 2023      Remi Mathias  [address] [address]          Oxygen can be discontinued         Rosie Dorado, DNP, APRN, CNP   12/05/23

## 2023-12-05 NOTE — COMMUNITY RESOURCES LIST (ENGLISH)
12/05/2023   [facility]  N/A  For questions about this resource list or additional care needs, please contact your primary care clinic or care manager.  Phone: [phone]   Email: N/A   Address: [address] [place] [address]   Hours: N/A        Financial Stability       Rent and mortgage payment assistance  1  [facility] Resource [place] Distance: 2.81 miles      In-Person, Phone/Virtual   [address] [place] [address]  Language: English, Papua [place] Guinean  Hours: Mon - Thu 9:00 AM - 4:00 PM  Fees: Free   Phone: (350) 172-6372 Email: center@saintandrews.[facility] Website: https://www.saintandrews.[employer]/[facility]-resource-[place]/     2  [facility] - Homeless Outreach Services Team - Emergency Rental Assistance Distance: 2.91 miles      In-Person, Phone/Virtual   [address] [place] [address]  Language: English  Hours: Mon - Thu 8:00 AM - 4:00 PM  Fees: Free   Phone: (368) 640-1919 Email: office@[facility]s.[facility] Website: https://www.saintandrews.[employer]/[facility]-resource-[place]/          Food and Nutrition       Food pantry  3  [facility] & Service [place] - Food Shelf Distance: 1.5 miles      Pickup   [address] [place] [address]  Language: English  Hours: Mon - Wed 9:30 AM - 2:30 PM  Fees: Free   Phone: (986) 495-9509 Email: lolita@[facility].org Website: http://[facility].org/[facility]/[place]/     4  [facility] natue Distance: 1.99 miles      Pickup   [address] [place] [address]  Language: English  Hours: Mon 4:00 PM - 5:00 PM  Fees: Free   Phone: (964) 426-2077 Email: info@Tagged Website: http://www.Liberata.[employer]     SNAP application assistance  5  [facility] [employer] [employer] (CLUES) - [place] Distance: 6.07 miles      In-Person   [address] [place] [address]  Language: English, [place]inean   Hours: Mon - Fri 8:30 AM - 5:00 PM  Fees: Free   Phone: (650) 929-5810 Email: info@clues.org Website: http://www.clues.org     6  [facility] of Human Services - MNFoodHelper (SNAP) Distance: 7.21 miles      Phone/Virtual   [address] [address] [place] [address]  Language: English, Hmong, Greenlandic, Ecuadorean, Sri Lankan, Hebrew  Hours: Mon - Fri 9:00 AM - 5:00 PM  Fees: Free   Phone: (845) 714-9782 Website: https://[place].gov/dhs/people-we-serve/adults/economic-assistance/food-nutrition/programs-and-services/supplemental-nutrition-assistance-program.jsp     Soup kitchen or free meals  7  [facility] Resource [place] - Thursday Night Community Meal Distance: 2.81 miles      In-Person   [address] [place] [address]  Language: English, Sri Lankan  Hours: Thu 6:00 PM - 7:00 PM  Fees: Free   Phone: (495) 998-3944 Email: center@saintandrews.Anke Website: https://www.saintandrews.Anke/community-resource-center/     8  [facility] - Homeless Outreach Services Team - Food Assistance Distance: 2.91 miles      Pickup   [address] [place] [address]  Language: English  Hours: Mon - Thu 9:30 AM - 3:30 PM  Fees: Free   Phone: (112) 711-4827 Email: office@saintandrews.Anke Website: https://www.saintandrews.Anke/community-resource-center/          Important Numbers & Websites       Emergency Services   911  [facility] Services   311  Poison Control   (632) 958-3737  Suicide Prevention Lifeline   (617) 651-6859 (TALK)  Child Abuse Hotline   (968) 432-9235 (4-A-Child)  Sexual Assault Hotline   (898) 801-2633 (HOPE)  National Runaway Safeline   (660) 162-4477 (RUNAWAY)  All-Options Talkline   (693) 205-1009  Substance Abuse Referral   (376) 998-3879 (HELP)

## 2023-12-05 NOTE — COMMUNITY RESOURCES LIST (ENGLISH)
12/05/2023   [facility]  N/A  For additional resource needs, please contact your health insurance member services or your primary care team.  Phone: [phone]   Email: N/A   Address: [facility] [place] [address]   Hours: N/A        Financial Stability       Rent and mortgage payment assistance  1  [facility] Distance: 2.81 miles      In-Person, Phone/Virtual   [address] [place] [address]  Language: English, Qatari  Hours: Mon - Thu 9:00 AM - 4:00 PM  Fees: Free   Phone: (879) 474-7141 Email: center@[facility]andrewswst.[facility] Website: https://www.saintandrews.[employer]/[facility]-resource-[place]/     2  [facility] - Homeless Outreach Services Team - Emergency Rental Assistance Distance: 2.91 miles      In-Person, Phone/Virtual   [address] [place] [address]  Language: English  Hours: Mon - Thu 8:00 AM - 4:00 PM  Fees: Free   Phone: (148) 770-3677 Email: office@saintandrews.[employer] Website: https://www.saintandrews51aiya.com/[facility]-resource-[place]/          Food and Nutrition       Food pantry  3  [facility] & Service [place] - Food Shelf Distance: 1.5 miles      Pickup   [address] [place] [address]  Language: English  Hours: Mon - Wed 9:30 AM - 2:30 PM  Fees: Free   Phone: (142) 419-7929 Email: lolita@[facility].org Website: http://[facility].org/[facility]/[place]/     4  [facility] Distance: 1.99 miles      Pickup   [address] [place] [address]  Language: English  Hours: Mon 4:00 PM - 5:00 PM  Fees: Free   Phone: (727) 631-3218 Email: info@Thanx Website: http://www.Agnitus.Cytosorbents     SNAP application assistance  5  [facility] Distance: 7.52 miles      Phone/Virtual   [address] [place] [address]  Language: English, Hmong, Togolese, Burkinan, Qatari  Hours:  RECORDS STATUS - ALL OTHER DIAGNOSIS      RECORDS RECEIVED FROM: Epic/   DATE RECEIVED: 9/6/2019    NOTES STATUS DETAILS   OFFICE NOTE from referring provider Complete  Sofy Soto APRN CNS  Referral 8/2/2019    OFFICE NOTE from medical oncologist Complete  EPIC   DISCHARGE SUMMARY from hospital Complete  EPIC   DISCHARGE REPORT from the ER N/A    OPERATIVE REPORT Complete [facility] IR Procedure 8/12/2019   PFT 7/25/2019    MEDICATION LIST Complete  [facility]   CLINICAL TRIAL TREATMENTS TO DATE N/A    LABS     PATHOLOGY REPORTS Complete  Epic 8/12/2019    ANYTHING RELATED TO DIAGNOSIS Complete  Recent Labs in EPIC   GENONOMIC TESTING     TYPE:     IMAGING (NEED IMAGES & REPORT)     CT SCANS Complete PACS   DX Chest  Complete [place]    MRI     Xray Chest  Complete  PACS   MAMMO     ULTRASOUND     PET Complete  PACS     Action    Action Taken 8/21/2019 10:10am     Called [place] to have IMG pushed to PACS.     8/22/2019 1:25pm   Resolved IMG in PACS          Mon - Fri 8:30 AM - 4:30 PM  Fees: Free   Phone: (284) 760-2747 Email: helpline@hungersoClavis Technologyions.org Website: https://www.hungersoClavis Technologyions.org/programs/[place]-food-helpline/     6  [employer] [employer] En Servicio (CLUES) - [place] Distance: 6.07 miles      In-Person   [address] [place] [address]  Language: English, Gibraltarian  Hours: Mon - Fri 8:30 AM - 5:00 PM  Fees: Free   Phone: (369) 665-2860 Email: info@[employer].org Website: http://www.[employer] Game Farmers.org     Soup kitchen or free meals  7  [facility] - Thursday Night Community Meal Distance: 2.81 miles      In-Person   [address] [place] [address]  Language: English, Gibraltarian  Hours: Thu 6:00 PM - 7:00 PM  Fees: Free   Phone: (776) 498-2791 Email: center@saintandrews.[facility] Website: https://www.saintandrewsmeinKauf/community-resource-center/     8  [facility] - Homeless Outreach Services Team - Food Assistance Distance: 2.91 miles      Pickup   [address] [place] [address]  Language: English  Hours: Mon - Thu 9:30 AM - 3:30 PM  Fees: Free   Phone: (995) 422-1864 Email: office@saintandrews.[employer] Website: https://www.saintandrews.[place] [employer]/community-resource-center/          Important Numbers & Websites       [facility]   211 211itedway.org  Poison Control   (375) 426-4549 [place]poison.org  Suicide and Crisis Lifeline   983 988lifeline.org  Childhelp National Child Abuse Hotline   504.155.5208 Childhelphotline.org  National Sexual Assault Hotline   (138) 904-6216 (HOPE) Rainn.org  National Runaway Safeline   (867) 538-1300 (RUNAWAY) [facility].org  Pregnancy & Postpartum Support [place]   Call/text 212-994-3558 Ppsupportmn.org  Substance Abuse National Helpline ([facility]   856-314-HELP (4357) Findtreatment.gov  Emergency Services   916

## 2023-12-05 NOTE — PROGRESS NOTES
"  Assessment & Plan   Problem List Items Addressed This Visit       Nicotine Dependence    Relevant Orders    Adult Pulmonary Medicine  Referral    Type 2 diabetes mellitus (H)    Relevant Medications    dulaglutide (TRULICITY) 1.5 MG/0.5ML pen    Acute respiratory failure with hypoxia (H)     Other Visit Diagnoses       Hospital discharge follow-up    -  Primary           - Reviewed inpatient records  - He is advised to schedule a follow up visit and PFTs within the next 4 weeks  - 6-minute walk is completed today, he is able to maintain oxygen saturations above 88% on room air. Okay to discontinue oxygen (see order in letter tab)   - He is commended for tobacco cessation   - Advised that he get a COVID, RSV, and influenza vaccine 2 weeks after completion of oral steroid   - 3 month follow up visit         Post Medication Reconciliation Status:  Discharge medications reconciled, continue medications without change  BMI:   Estimated body mass index is 31.89 kg/m  as calculated from the following:    Height as of this encounter: 1.702 m (5' 7\").    Weight as of this encounter: 92.4 kg (203 lb 9.6 oz).       Rosie Dorado NP  [facility]    Kaleb Mccray is a 63 year old, presenting for the following health issues:  Hospital F/U (11/19/23-11/27/23 [facility] for respiratory failure)        12/5/2023    11:24 AM   Additional Questions   Roomed by Adwoa CARPIO       11/28/2023    11:48 AM   Post Discharge Outreach   Admission Date 11/19/2023   Reason for Admission breathing problems   Discharge Date 11/27/2023   Discharge Diagnosis Acute respiratory failure with hypoxia  COPD exacerbation  empiric community acquired pneumonia treatment    Acute bronchitis  Tobacco use- counseled for complete cessation   How are you doing now that you are home? good   How are your symptoms? (Red Flag symptoms escalate to triage hotline per guidelines) Improved   Do you feel your condition is stable " enough to be safe at home until your provider visit? Yes   Does the patient have their discharge instructions?  Yes   Does the patient have questions regarding their discharge instructions?  No   Were you started on any new medications or were there changes to any of your previous medications?  Yes   Does the patient have all of their medications? Yes   Do you have questions regarding any of your medications?  No   Do you have all of your needed medical supplies or equipment (DME)?  (i.e. oxygen tank, CPAP, cane, etc.) Yes   Discharge follow-up appointment scheduled within 14 calendar days?  Yes   Discharge Follow Up Appointment Date 12/5/2023   Discharge Follow Up Appointment Scheduled with? Primary Care Provider     Hospital Follow-up Visit:    Hospital/Nursing Home/IP Rehab Facility: [facility]  Date of Admission: 11/19/23  Date of Discharge: 11/27/23  Reason(s) for Admission: respiratory failure     Was your hospitalization related to COVID-19? No   Problems taking medications regularly:  None  Medication changes since discharge: Yes  Problems adhering to non-medication therapy:  None    Summary of hospitalization:  [facility] discharge summary reviewed  Diagnostic Tests/Treatments reviewed.  Follow up needed: none  Other Healthcare Providers Involved in Patient s Care:         None    He was seen in UR for dyspnea, chest tightness, ad coughing. Because of the severity of symptoms, he was referred to the ED for further evaluation. He was admitted for hypoxic respiratory failure in the setting of a COPD exacerbation. CTA negative for PE. He was treated with duonebs and steroids. A CXR on 11/22 suggested a new opacity at the right lung base suggesting right pleural effusion, he was treated with CAP abx. A repeat CXR showed stability, a BNP was normal. Echo with bubble study was normal. He was discharged on 3L O2 at rest, 6L with exertion.     He has been using 3LPM of  "oxygen at rest and with activity and oxygen readings have been maintaining between 94-96%. Today he showered and shaved without oxygen, his level remained at 96% when he checked. He is no longer coughing, mucus and phlegm has resolved. He is no longer having dyspnea.     Tobacco use-  he is wearing a nicotine patch. He has not smoked since 23.     DM- No recent home glucose checks.         Objective    /76   Pulse 68   Temp 98.3  F (36.8  C) (Oral)   Resp 20   Ht 1.702 m (5' 7\")   Wt 92.4 kg (203 lb 9.6 oz)   SpO2 97%   BMI 31.89 kg/m    Body mass index is 31.89 kg/m .  Physical Exam   GENERAL: healthy, alert and no distress  EYES: Eyes grossly normal to inspection, conjunctivae and sclerae normal  RESP: lungs clear to auscultation - no rales, rhonchi or wheezes. Bases are mildly diminished  CV: regular rate and rhythm, normal S1 S2, no S3 or S4, no murmur, click or rub, no peripheral edema    6-minute walk on room air  Restin%  Minute 1: 95%  Minute 2: 95%  Minute 3: 90%  Minute 4: 92%  Minute 5: 92%  Minute 6: 92%                  "

## 2023-12-08 ENCOUNTER — TELEPHONE (OUTPATIENT)
Dept: INTERNAL MEDICINE | Facility: CLINIC | Age: 63
End: 2023-12-08
Payer: COMMERCIAL

## 2023-12-08 DIAGNOSIS — J96.01 ACUTE RESPIRATORY FAILURE WITH HYPOXIA (H): Primary | ICD-10-CM

## 2023-12-08 NOTE — TELEPHONE ENCOUNTER
Pt calling, he is needing to know if he still needs to be on his oxygen, he saw Rosie on the 5th, stated he hasnt used the oxygen machine for a few days now. Just needs to know if its ok to have the company pick it up. May call him back anytime

## 2023-12-11 RX ORDER — ALBUTEROL SULFATE 90 UG/1
2 AEROSOL, METERED RESPIRATORY (INHALATION) EVERY 6 HOURS PRN
Qty: 18 G | Refills: 0 | Status: SHIPPED | OUTPATIENT
Start: 2023-12-11 | End: 2024-01-18

## 2023-12-11 NOTE — TELEPHONE ENCOUNTER
Spoke with patient regarding discontinuing oxygen. He understood and is also wondering if you could send in an inhaler for him to have on hand just incase he gets SOB

## 2023-12-11 NOTE — TELEPHONE ENCOUNTER
Yes, I will definitely send in an albuterol inhaler. I apologize, I didn't realize that he didn't have one.

## 2023-12-11 NOTE — TELEPHONE ENCOUNTER
The oxygen can be discontinued. Please see letter tab with order to discontinue oxygen. Please fax it to the oxygen company if needed.

## 2023-12-21 DIAGNOSIS — F41.1 ANXIETY STATE: ICD-10-CM

## 2023-12-21 RX ORDER — LORAZEPAM 0.5 MG/1
0.5 TABLET ORAL DAILY PRN
Qty: 15 TABLET | Refills: 0 | Status: SHIPPED | OUTPATIENT
Start: 2023-12-21 | End: 2024-04-15

## 2024-01-09 DIAGNOSIS — E11.29 TYPE 2 DIABETES MELLITUS WITH MICROALBUMINURIA, WITHOUT LONG-TERM CURRENT USE OF INSULIN (H): ICD-10-CM

## 2024-01-09 DIAGNOSIS — R80.9 TYPE 2 DIABETES MELLITUS WITH MICROALBUMINURIA, WITHOUT LONG-TERM CURRENT USE OF INSULIN (H): ICD-10-CM

## 2024-01-09 RX ORDER — GLIPIZIDE 10 MG/1
20 TABLET, FILM COATED, EXTENDED RELEASE ORAL DAILY
Qty: 180 TABLET | Refills: 1 | Status: SHIPPED | OUTPATIENT
Start: 2024-01-09 | End: 2024-04-06

## 2024-01-18 DIAGNOSIS — J96.01 ACUTE RESPIRATORY FAILURE WITH HYPOXIA (H): ICD-10-CM

## 2024-01-18 RX ORDER — ALBUTEROL SULFATE 90 UG/1
AEROSOL, METERED RESPIRATORY (INHALATION)
Qty: 18 G | Refills: 0 | Status: SHIPPED | OUTPATIENT
Start: 2024-01-18 | End: 2024-03-06

## 2024-02-27 ENCOUNTER — TELEPHONE (OUTPATIENT)
Dept: INTERNAL MEDICINE | Facility: CLINIC | Age: 64
End: 2024-02-27
Payer: COMMERCIAL

## 2024-02-27 DIAGNOSIS — R80.9 TYPE 2 DIABETES MELLITUS WITH MICROALBUMINURIA, WITHOUT LONG-TERM CURRENT USE OF INSULIN (H): Primary | ICD-10-CM

## 2024-02-27 DIAGNOSIS — E11.29 TYPE 2 DIABETES MELLITUS WITH MICROALBUMINURIA, WITHOUT LONG-TERM CURRENT USE OF INSULIN (H): Primary | ICD-10-CM

## 2024-02-27 NOTE — TELEPHONE ENCOUNTER
Medication Question or Refill    Contacts         Type Contact Phone/Fax    02/27/2024 04:32 PM CST Phone (Incoming) Mahendra Mathias (Self) [phone] (H)            What medication are you calling about (include dose and sig)?: dulaglutide (TRULICITY) 1.5 MG/0.5ML pen     [facility] is unable to get medication at 1.5MG/0.5ML pen as it is on back order and [facility] does not know when it will be back in stock.  They do have lower dose pen .75 available.    Patient would like to know what PCP would like to do?  Prescribe alternative medication or dose?  Patient has not contacted his insurance to see what other medications are covered with his pharmacy insurance benefits    Preferred Pharmacy:  [facility] - [place], [place] - [address] [place] [address] [address]  [address] [facility] [address]  [facility] [address]  Phone: [phone] Fax: [phone]    Controlled Substance Agreement on file:   CSA -- Patient Level:     [Media Unavailable] Controlled Substance Agreement - Non - Opioid - Scan on 5/14/2021   [Media Unavailable] Controlled Substance Agreement - Opioid - Scan on 9/28/2018       Who prescribed the medication?: Rosie Dorado NP    Do you need a refill? Yes    Patient offered an appointment? No Last Visit with PCP = 12/5/2023    Do you have any questions or concerns?  No      Could we send this information to you in [facility] or would you prefer to receive a phone call?:   Patient would prefer a phone call   Okay to leave a detailed message?: Yes at Cell number on file:    Telephone Information:   Mobile [phone]

## 2024-02-28 NOTE — TELEPHONE ENCOUNTER
Let pt know I sent in the 0.75 mg pen to take 2 injections of this each week so that it equals 1.5 mg

## 2024-03-06 ENCOUNTER — OFFICE VISIT (OUTPATIENT)
Dept: INTERNAL MEDICINE | Facility: CLINIC | Age: 64
End: 2024-03-06
Payer: COMMERCIAL

## 2024-03-06 VITALS
OXYGEN SATURATION: 98 % | WEIGHT: 208.5 LBS | DIASTOLIC BLOOD PRESSURE: 62 MMHG | SYSTOLIC BLOOD PRESSURE: 124 MMHG | HEIGHT: 67 IN | BODY MASS INDEX: 32.72 KG/M2 | HEART RATE: 72 BPM | TEMPERATURE: 98.1 F | RESPIRATION RATE: 15 BRPM

## 2024-03-06 DIAGNOSIS — E11.29 TYPE 2 DIABETES MELLITUS WITH MICROALBUMINURIA, WITHOUT LONG-TERM CURRENT USE OF INSULIN (H): ICD-10-CM

## 2024-03-06 DIAGNOSIS — J43.9 PULMONARY EMPHYSEMA, UNSPECIFIED EMPHYSEMA TYPE (H): ICD-10-CM

## 2024-03-06 DIAGNOSIS — F41.1 ANXIETY STATE: ICD-10-CM

## 2024-03-06 DIAGNOSIS — I10 ESSENTIAL HYPERTENSION: ICD-10-CM

## 2024-03-06 DIAGNOSIS — R80.9 TYPE 2 DIABETES MELLITUS WITH MICROALBUMINURIA, WITHOUT LONG-TERM CURRENT USE OF INSULIN (H): ICD-10-CM

## 2024-03-06 DIAGNOSIS — E78.5 HYPERLIPIDEMIA, UNSPECIFIED HYPERLIPIDEMIA TYPE: ICD-10-CM

## 2024-03-06 DIAGNOSIS — F17.200 TOBACCO USE DISORDER: Primary | ICD-10-CM

## 2024-03-06 PROBLEM — J96.01 ACUTE RESPIRATORY FAILURE WITH HYPOXIA (H): Status: RESOLVED | Noted: 2023-11-19 | Resolved: 2024-03-06

## 2024-03-06 LAB — HBA1C MFR BLD: 7 % (ref 0–5.6)

## 2024-03-06 PROCEDURE — 36415 COLL VENOUS BLD VENIPUNCTURE: CPT | Performed by: NURSE PRACTITIONER

## 2024-03-06 PROCEDURE — 99214 OFFICE O/P EST MOD 30 MIN: CPT | Performed by: NURSE PRACTITIONER

## 2024-03-06 PROCEDURE — 83036 HEMOGLOBIN GLYCOSYLATED A1C: CPT | Performed by: NURSE PRACTITIONER

## 2024-03-06 RX ORDER — TIOTROPIUM BROMIDE 18 UG/1
18 CAPSULE ORAL; RESPIRATORY (INHALATION) DAILY
Qty: 90 CAPSULE | Refills: 1 | Status: SHIPPED | OUTPATIENT
Start: 2024-03-06

## 2024-03-06 RX ORDER — ALBUTEROL SULFATE 90 UG/1
1-2 AEROSOL, METERED RESPIRATORY (INHALATION) EVERY 4 HOURS PRN
Qty: 18 G | Refills: 0 | Status: SHIPPED | OUTPATIENT
Start: 2024-03-06 | End: 2024-04-02

## 2024-03-06 RX ORDER — NICOTINE 21 MG/24HR
1 PATCH, TRANSDERMAL 24 HOURS TRANSDERMAL EVERY 24 HOURS
Qty: 28 PATCH | Refills: 0 | Status: SHIPPED | OUTPATIENT
Start: 2024-03-06

## 2024-03-06 RX ORDER — RESPIRATORY SYNCYTIAL VIRUS VACCINE 120MCG/0.5
0.5 KIT INTRAMUSCULAR ONCE
Qty: 1 EACH | Refills: 0 | Status: CANCELLED | OUTPATIENT
Start: 2024-03-06 | End: 2024-03-06

## 2024-03-06 RX ORDER — NICOTINE 21 MG/24HR
1 PATCH, TRANSDERMAL 24 HOURS TRANSDERMAL EVERY 24 HOURS
Qty: 28 PATCH | Refills: 1 | Status: SHIPPED | OUTPATIENT
Start: 2024-03-06

## 2024-03-06 RX ORDER — ESCITALOPRAM OXALATE 20 MG/1
20 TABLET ORAL DAILY
Qty: 90 TABLET | Refills: 2 | Status: SHIPPED | OUTPATIENT
Start: 2024-03-06 | End: 2024-09-11

## 2024-03-06 RX ORDER — ATORVASTATIN CALCIUM 20 MG/1
20 TABLET, FILM COATED ORAL DAILY
Qty: 90 TABLET | Refills: 3 | Status: SHIPPED | OUTPATIENT
Start: 2024-03-06

## 2024-03-06 ASSESSMENT — ANXIETY QUESTIONNAIRES
1. FEELING NERVOUS, ANXIOUS, OR ON EDGE: SEVERAL DAYS
3. WORRYING TOO MUCH ABOUT DIFFERENT THINGS: SEVERAL DAYS
6. BECOMING EASILY ANNOYED OR IRRITABLE: NOT AT ALL
8. IF YOU CHECKED OFF ANY PROBLEMS, HOW DIFFICULT HAVE THESE MADE IT FOR YOU TO DO YOUR WORK, TAKE CARE OF THINGS AT HOME, OR GET ALONG WITH OTHER PEOPLE?: SOMEWHAT DIFFICULT
2. NOT BEING ABLE TO STOP OR CONTROL WORRYING: SEVERAL DAYS
GAD7 TOTAL SCORE: 4
7. FEELING AFRAID AS IF SOMETHING AWFUL MIGHT HAPPEN: NOT AT ALL
IF YOU CHECKED OFF ANY PROBLEMS ON THIS QUESTIONNAIRE, HOW DIFFICULT HAVE THESE PROBLEMS MADE IT FOR YOU TO DO YOUR WORK, TAKE CARE OF THINGS AT HOME, OR GET ALONG WITH OTHER PEOPLE: SOMEWHAT DIFFICULT
7. FEELING AFRAID AS IF SOMETHING AWFUL MIGHT HAPPEN: NOT AT ALL
4. TROUBLE RELAXING: SEVERAL DAYS
GAD7 TOTAL SCORE: 4
GAD7 TOTAL SCORE: 4
5. BEING SO RESTLESS THAT IT IS HARD TO SIT STILL: NOT AT ALL

## 2024-03-06 ASSESSMENT — PATIENT HEALTH QUESTIONNAIRE - PHQ9
SUM OF ALL RESPONSES TO PHQ QUESTIONS 1-9: 4
10. IF YOU CHECKED OFF ANY PROBLEMS, HOW DIFFICULT HAVE THESE PROBLEMS MADE IT FOR YOU TO DO YOUR WORK, TAKE CARE OF THINGS AT HOME, OR GET ALONG WITH OTHER PEOPLE: SOMEWHAT DIFFICULT
SUM OF ALL RESPONSES TO PHQ QUESTIONS 1-9: 4

## 2024-03-06 NOTE — ASSESSMENT & PLAN NOTE
Try nicotine patches again starting at 21 mg patch. In 1 month, lower to 14 mg. May use a nicotine gum PRN when he lowers the patch dose.

## 2024-03-06 NOTE — ASSESSMENT & PLAN NOTE
Currently using albuterol BID. Strongly suspect COPD due to tobacco use history and radiographic findings of emphysema   - START: spiriva  - Continue albuterol

## 2024-03-15 ENCOUNTER — TELEPHONE (OUTPATIENT)
Dept: NURSING | Facility: CLINIC | Age: 64
End: 2024-03-15
Payer: COMMERCIAL

## 2024-03-15 NOTE — TELEPHONE ENCOUNTER
Pt calling with medication problem;    Problem getting dulaglutide 0.75 mg subcutaneous every 7 days     Blood sugar at time of this call is 128    Pt reports, has missed 3 doses (3 weeks) already    Pt is advised to call insurance company to ask which medication they will cover and which pharmacy Pt can use.     Pt requesting that msg be routed to PCP.    Denise Leon RN, Nurse Advisor 5:50 PM 3/15/2024

## 2024-03-18 NOTE — TELEPHONE ENCOUNTER
Please follow up with patient. Is there a different pharmacy he would like the prescription sent to?

## 2024-03-19 NOTE — TELEPHONE ENCOUNTER
1A to contact pt, MB was full.       Will send DocOnYou msg regarding this.       Sumanth Wilkerson Jr., CMA on 3/19/2024 at 11:36 AM

## 2024-03-21 ENCOUNTER — E-VISIT (OUTPATIENT)
Dept: INTERNAL MEDICINE | Facility: CLINIC | Age: 64
End: 2024-03-21
Payer: COMMERCIAL

## 2024-03-21 DIAGNOSIS — J32.9 SINUSITIS, UNSPECIFIED CHRONICITY, UNSPECIFIED LOCATION: Primary | ICD-10-CM

## 2024-03-21 PROCEDURE — 99421 OL DIG E/M SVC 5-10 MIN: CPT | Performed by: NURSE PRACTITIONER

## 2024-03-21 NOTE — PATIENT INSTRUCTIONS
Thank you for choosing us for your care. I have placed an order for a prescription to start treatment for a sinus infection so that you can start treatment. View your full visit summary for details by clicking on the link below. Your pharmacist will able to address any questions you may have about the medication.     If you're not feeling better within 5-7 days, please schedule an appointment.  You can schedule an appointment right here in [facility], or call [phone]  If the visit is for the same symptoms as your eVisit, we'll refund the cost of your eVisit if seen within seven days.

## 2024-04-01 ENCOUNTER — NURSE TRIAGE (OUTPATIENT)
Dept: INTERNAL MEDICINE | Facility: CLINIC | Age: 64
End: 2024-04-01
Payer: COMMERCIAL

## 2024-04-01 DIAGNOSIS — J43.9 PULMONARY EMPHYSEMA, UNSPECIFIED EMPHYSEMA TYPE (H): ICD-10-CM

## 2024-04-01 NOTE — TELEPHONE ENCOUNTER
S-(situation): hoarseness    B-(background): pt has had productive cough for a week. Last fever 4 days ago at 100.0. pt states last cigarette was 2 days ago    A-(assessment): pt denies sore throat.  No fever currently.   No difficulty eating or drinking.    R-(recommendations): no protocol listed. Writer encouraged patient to push fluids, with his productive cough this will help thin secretions and may it easier to cough up and out. Writer educated that warm fluids can help relax the airway ie soup broths and tea with honey. Writer educated to try a humidifier to help relieve the cough.  He can also try OTC cough drops or hard candies to help relieve. No further questions from patient at this time.

## 2024-04-02 RX ORDER — ALBUTEROL SULFATE 90 UG/1
AEROSOL, METERED RESPIRATORY (INHALATION)
Qty: 9 G | Refills: 0 | Status: SHIPPED | OUTPATIENT
Start: 2024-04-02 | End: 2024-04-22

## 2024-04-06 ENCOUNTER — APPOINTMENT (OUTPATIENT)
Dept: RADIOLOGY | Facility: HOSPITAL | Age: 64
DRG: 190 | End: 2024-04-06
Attending: EMERGENCY MEDICINE
Payer: COMMERCIAL

## 2024-04-06 ENCOUNTER — HOSPITAL ENCOUNTER (INPATIENT)
Facility: HOSPITAL | Age: 64
LOS: 5 days | Discharge: HOME | DRG: 190 | End: 2024-04-11
Attending: EMERGENCY MEDICINE | Admitting: INTERNAL MEDICINE
Payer: COMMERCIAL

## 2024-04-06 DIAGNOSIS — J43.9 PULMONARY EMPHYSEMA, UNSPECIFIED EMPHYSEMA TYPE (H): Primary | ICD-10-CM

## 2024-04-06 DIAGNOSIS — J44.1 COPD WITH ACUTE EXACERBATION (H): ICD-10-CM

## 2024-04-06 LAB
ANION GAP SERPL CALCULATED.3IONS-SCNC: 12 MMOL/L (ref 7–15)
BASOPHILS # BLD AUTO: 0.1 10E3/UL (ref 0–0.2)
BASOPHILS NFR BLD AUTO: 1 %
BUN SERPL-MCNC: 9.8 MG/DL (ref 8–23)
CALCIUM SERPL-MCNC: 8.9 MG/DL (ref 8.8–10.2)
CHLORIDE SERPL-SCNC: 103 MMOL/L (ref 98–107)
CREAT SERPL-MCNC: 0.7 MG/DL (ref 0.67–1.17)
DEPRECATED HCO3 PLAS-SCNC: 24 MMOL/L (ref 22–29)
EGFRCR SERPLBLD CKD-EPI 2021: >90 ML/MIN/1.73M2
EOSINOPHIL # BLD AUTO: 1.1 10E3/UL (ref 0–0.7)
EOSINOPHIL NFR BLD AUTO: 15 %
ERYTHROCYTE [DISTWIDTH] IN BLOOD BY AUTOMATED COUNT: 13.3 % (ref 10–15)
FLUAV RNA SPEC QL NAA+PROBE: NEGATIVE
FLUBV RNA RESP QL NAA+PROBE: NEGATIVE
GLUCOSE BLDC GLUCOMTR-MCNC: 276 MG/DL (ref 70–99)
GLUCOSE SERPL-MCNC: 176 MG/DL (ref 70–99)
HCT VFR BLD AUTO: 48.4 % (ref 40–53)
HGB BLD-MCNC: 16.2 G/DL (ref 13.3–17.7)
HOLD SPECIMEN: NORMAL
IMM GRANULOCYTES # BLD: 0 10E3/UL
IMM GRANULOCYTES NFR BLD: 0 %
LYMPHOCYTES # BLD AUTO: 1.5 10E3/UL (ref 0.8–5.3)
LYMPHOCYTES NFR BLD AUTO: 20 %
MCH RBC QN AUTO: 32 PG (ref 26.5–33)
MCHC RBC AUTO-ENTMCNC: 33.5 G/DL (ref 31.5–36.5)
MCV RBC AUTO: 96 FL (ref 78–100)
MONOCYTES # BLD AUTO: 0.6 10E3/UL (ref 0–1.3)
MONOCYTES NFR BLD AUTO: 8 %
NEUTROPHILS # BLD AUTO: 4.1 10E3/UL (ref 1.6–8.3)
NEUTROPHILS NFR BLD AUTO: 56 %
NRBC # BLD AUTO: 0 10E3/UL
NRBC BLD AUTO-RTO: 0 /100
NT-PROBNP SERPL-MCNC: 80 PG/ML (ref 0–900)
PLATELET # BLD AUTO: 146 10E3/UL (ref 150–450)
POTASSIUM SERPL-SCNC: 3.8 MMOL/L (ref 3.4–5.3)
RBC # BLD AUTO: 5.07 10E6/UL (ref 4.4–5.9)
RSV RNA SPEC NAA+PROBE: NEGATIVE
SARS-COV-2 RNA RESP QL NAA+PROBE: NEGATIVE
SODIUM SERPL-SCNC: 139 MMOL/L (ref 135–145)
WBC # BLD AUTO: 7.2 10E3/UL (ref 4–11)

## 2024-04-06 PROCEDURE — 99285 EMERGENCY DEPT VISIT HI MDM: CPT | Mod: 25

## 2024-04-06 PROCEDURE — 250N000009 HC RX 250: Performed by: EMERGENCY MEDICINE

## 2024-04-06 PROCEDURE — 120N000001 HC R&B MED SURG/OB

## 2024-04-06 PROCEDURE — 94640 AIRWAY INHALATION TREATMENT: CPT

## 2024-04-06 PROCEDURE — 71045 X-RAY EXAM CHEST 1 VIEW: CPT

## 2024-04-06 PROCEDURE — 96375 TX/PRO/DX INJ NEW DRUG ADDON: CPT

## 2024-04-06 PROCEDURE — 258N000003 HC RX IP 258 OP 636: Performed by: EMERGENCY MEDICINE

## 2024-04-06 PROCEDURE — 99222 1ST HOSP IP/OBS MODERATE 55: CPT | Performed by: INTERNAL MEDICINE

## 2024-04-06 PROCEDURE — 250N000011 HC RX IP 250 OP 636: Performed by: EMERGENCY MEDICINE

## 2024-04-06 PROCEDURE — 250N000011 HC RX IP 250 OP 636: Performed by: INTERNAL MEDICINE

## 2024-04-06 PROCEDURE — 82962 GLUCOSE BLOOD TEST: CPT

## 2024-04-06 PROCEDURE — 96365 THER/PROPH/DIAG IV INF INIT: CPT

## 2024-04-06 PROCEDURE — 36415 COLL VENOUS BLD VENIPUNCTURE: CPT | Performed by: EMERGENCY MEDICINE

## 2024-04-06 PROCEDURE — 83880 ASSAY OF NATRIURETIC PEPTIDE: CPT | Performed by: EMERGENCY MEDICINE

## 2024-04-06 PROCEDURE — 93005 ELECTROCARDIOGRAM TRACING: CPT | Performed by: EMERGENCY MEDICINE

## 2024-04-06 PROCEDURE — 250N000012 HC RX MED GY IP 250 OP 636 PS 637: Performed by: INTERNAL MEDICINE

## 2024-04-06 PROCEDURE — 85025 COMPLETE CBC W/AUTO DIFF WBC: CPT | Performed by: EMERGENCY MEDICINE

## 2024-04-06 PROCEDURE — 87637 SARSCOV2&INF A&B&RSV AMP PRB: CPT | Performed by: EMERGENCY MEDICINE

## 2024-04-06 PROCEDURE — 80048 BASIC METABOLIC PNL TOTAL CA: CPT | Performed by: EMERGENCY MEDICINE

## 2024-04-06 RX ORDER — IPRATROPIUM BROMIDE AND ALBUTEROL SULFATE 2.5; .5 MG/3ML; MG/3ML
3 SOLUTION RESPIRATORY (INHALATION) ONCE
Status: COMPLETED | OUTPATIENT
Start: 2024-04-06 | End: 2024-04-06

## 2024-04-06 RX ORDER — AMOXICILLIN 250 MG
1 CAPSULE ORAL 2 TIMES DAILY PRN
Status: DISCONTINUED | OUTPATIENT
Start: 2024-04-06 | End: 2024-04-11 | Stop reason: HOSPADM

## 2024-04-06 RX ORDER — ESCITALOPRAM OXALATE 20 MG/1
20 TABLET ORAL DAILY
Status: DISCONTINUED | OUTPATIENT
Start: 2024-04-07 | End: 2024-04-11 | Stop reason: HOSPADM

## 2024-04-06 RX ORDER — METHYLPREDNISOLONE SODIUM SUCCINATE 125 MG/2ML
125 INJECTION, POWDER, LYOPHILIZED, FOR SOLUTION INTRAMUSCULAR; INTRAVENOUS ONCE
Status: COMPLETED | OUTPATIENT
Start: 2024-04-06 | End: 2024-04-06

## 2024-04-06 RX ORDER — DEXTROSE MONOHYDRATE 25 G/50ML
25-50 INJECTION, SOLUTION INTRAVENOUS
Status: DISCONTINUED | OUTPATIENT
Start: 2024-04-06 | End: 2024-04-11 | Stop reason: HOSPADM

## 2024-04-06 RX ORDER — LISINOPRIL 2.5 MG/1
2.5 TABLET ORAL DAILY
Status: DISCONTINUED | OUTPATIENT
Start: 2024-04-07 | End: 2024-04-11 | Stop reason: HOSPADM

## 2024-04-06 RX ORDER — NICOTINE POLACRILEX 4 MG
15-30 LOZENGE BUCCAL
Status: DISCONTINUED | OUTPATIENT
Start: 2024-04-06 | End: 2024-04-11 | Stop reason: HOSPADM

## 2024-04-06 RX ORDER — MAGNESIUM SULFATE HEPTAHYDRATE 40 MG/ML
2 INJECTION, SOLUTION INTRAVENOUS ONCE
Status: COMPLETED | OUTPATIENT
Start: 2024-04-06 | End: 2024-04-06

## 2024-04-06 RX ORDER — ENOXAPARIN SODIUM 100 MG/ML
40 INJECTION SUBCUTANEOUS EVERY 24 HOURS
Status: DISCONTINUED | OUTPATIENT
Start: 2024-04-06 | End: 2024-04-11 | Stop reason: HOSPADM

## 2024-04-06 RX ORDER — LIDOCAINE 40 MG/G
CREAM TOPICAL
Status: DISCONTINUED | OUTPATIENT
Start: 2024-04-06 | End: 2024-04-11 | Stop reason: HOSPADM

## 2024-04-06 RX ORDER — NICOTINE 21 MG/24HR
1 PATCH, TRANSDERMAL 24 HOURS TRANSDERMAL DAILY PRN
Status: DISCONTINUED | OUTPATIENT
Start: 2024-04-06 | End: 2024-04-07

## 2024-04-06 RX ORDER — ONDANSETRON 4 MG/1
4 TABLET, ORALLY DISINTEGRATING ORAL EVERY 6 HOURS PRN
Status: DISCONTINUED | OUTPATIENT
Start: 2024-04-06 | End: 2024-04-11 | Stop reason: HOSPADM

## 2024-04-06 RX ORDER — ACETAMINOPHEN 650 MG/1
650 SUPPOSITORY RECTAL EVERY 4 HOURS PRN
Status: DISCONTINUED | OUTPATIENT
Start: 2024-04-06 | End: 2024-04-11 | Stop reason: HOSPADM

## 2024-04-06 RX ORDER — GLIPIZIDE 10 MG/1
10 TABLET, FILM COATED, EXTENDED RELEASE ORAL 2 TIMES DAILY
COMMUNITY
End: 2024-06-26

## 2024-04-06 RX ORDER — POLYETHYLENE GLYCOL 3350 17 G/17G
17 POWDER, FOR SOLUTION ORAL 2 TIMES DAILY PRN
Status: DISCONTINUED | OUTPATIENT
Start: 2024-04-06 | End: 2024-04-11 | Stop reason: HOSPADM

## 2024-04-06 RX ORDER — ACETAMINOPHEN 325 MG/1
650 TABLET ORAL EVERY 4 HOURS PRN
Status: DISCONTINUED | OUTPATIENT
Start: 2024-04-06 | End: 2024-04-11 | Stop reason: HOSPADM

## 2024-04-06 RX ORDER — AMOXICILLIN 250 MG
2 CAPSULE ORAL 2 TIMES DAILY PRN
Status: DISCONTINUED | OUTPATIENT
Start: 2024-04-06 | End: 2024-04-11 | Stop reason: HOSPADM

## 2024-04-06 RX ORDER — CALCIUM CARBONATE 500 MG/1
1000 TABLET, CHEWABLE ORAL 4 TIMES DAILY PRN
Status: DISCONTINUED | OUTPATIENT
Start: 2024-04-06 | End: 2024-04-11 | Stop reason: HOSPADM

## 2024-04-06 RX ORDER — ATORVASTATIN CALCIUM 10 MG/1
20 TABLET, FILM COATED ORAL DAILY
Status: DISCONTINUED | OUTPATIENT
Start: 2024-04-07 | End: 2024-04-11 | Stop reason: HOSPADM

## 2024-04-06 RX ORDER — METHYLPREDNISOLONE SODIUM SUCCINATE 40 MG/ML
40 INJECTION, POWDER, LYOPHILIZED, FOR SOLUTION INTRAMUSCULAR; INTRAVENOUS EVERY 6 HOURS
Status: DISCONTINUED | OUTPATIENT
Start: 2024-04-07 | End: 2024-04-07

## 2024-04-06 RX ORDER — IPRATROPIUM BROMIDE AND ALBUTEROL SULFATE 2.5; .5 MG/3ML; MG/3ML
3 SOLUTION RESPIRATORY (INHALATION)
Status: DISCONTINUED | OUTPATIENT
Start: 2024-04-07 | End: 2024-04-11 | Stop reason: HOSPADM

## 2024-04-06 RX ORDER — ONDANSETRON 2 MG/ML
4 INJECTION INTRAMUSCULAR; INTRAVENOUS EVERY 6 HOURS PRN
Status: DISCONTINUED | OUTPATIENT
Start: 2024-04-06 | End: 2024-04-11 | Stop reason: HOSPADM

## 2024-04-06 RX ORDER — IPRATROPIUM BROMIDE AND ALBUTEROL SULFATE 2.5; .5 MG/3ML; MG/3ML
3 SOLUTION RESPIRATORY (INHALATION)
Status: DISCONTINUED | OUTPATIENT
Start: 2024-04-06 | End: 2024-04-11 | Stop reason: HOSPADM

## 2024-04-06 RX ADMIN — ENOXAPARIN SODIUM 40 MG: 40 INJECTION SUBCUTANEOUS at 22:37

## 2024-04-06 RX ADMIN — INSULIN GLARGINE 10 UNITS: 100 INJECTION, SOLUTION SUBCUTANEOUS at 22:35

## 2024-04-06 RX ADMIN — INSULIN ASPART 2 UNITS: 100 INJECTION, SOLUTION INTRAVENOUS; SUBCUTANEOUS at 23:36

## 2024-04-06 RX ADMIN — METHYLPREDNISOLONE SODIUM SUCCINATE 125 MG: 125 INJECTION, POWDER, FOR SOLUTION INTRAMUSCULAR; INTRAVENOUS at 19:30

## 2024-04-06 RX ADMIN — IPRATROPIUM BROMIDE AND ALBUTEROL SULFATE 3 ML: .5; 3 SOLUTION RESPIRATORY (INHALATION) at 19:31

## 2024-04-06 RX ADMIN — AZITHROMYCIN MONOHYDRATE 500 MG: 500 INJECTION, POWDER, LYOPHILIZED, FOR SOLUTION INTRAVENOUS at 21:43

## 2024-04-06 RX ADMIN — MAGNESIUM SULFATE HEPTAHYDRATE 2 G: 40 INJECTION, SOLUTION INTRAVENOUS at 19:33

## 2024-04-06 RX ADMIN — IPRATROPIUM BROMIDE AND ALBUTEROL SULFATE 3 ML: .5; 3 SOLUTION RESPIRATORY (INHALATION) at 21:24

## 2024-04-06 ASSESSMENT — COLUMBIA-SUICIDE SEVERITY RATING SCALE - C-SSRS
1. IN THE PAST MONTH, HAVE YOU WISHED YOU WERE DEAD OR WISHED YOU COULD GO TO SLEEP AND NOT WAKE UP?: NO
6. HAVE YOU EVER DONE ANYTHING, STARTED TO DO ANYTHING, OR PREPARED TO DO ANYTHING TO END YOUR LIFE?: NO
2. HAVE YOU ACTUALLY HAD ANY THOUGHTS OF KILLING YOURSELF IN THE PAST MONTH?: NO

## 2024-04-06 ASSESSMENT — ACTIVITIES OF DAILY LIVING (ADL)
ADLS_ACUITY_SCORE: 37

## 2024-04-06 NOTE — LETTER
[place] [address]  [address] [facility] [address]  [phone]      2024    Remi Mathias  [address] [place] [address]  [phone] (home)     : 1960      To Whom it may concern:    Remi Mathias was seen in our Emergency Department today, 2024 for an injury that was reported to be work related.      For the next 1 days he should not work    The employee might be taking medication so that they cannot operate moving machinery or perform activities that require balancing or working above ground.    {Restrictions:290404050}    {WC RESTRICT:775247}    Sincerely,    Santa Stone MD

## 2024-04-07 LAB
ERYTHROCYTE [DISTWIDTH] IN BLOOD BY AUTOMATED COUNT: 13 % (ref 10–15)
GLUCOSE BLDC GLUCOMTR-MCNC: 197 MG/DL (ref 70–99)
GLUCOSE BLDC GLUCOMTR-MCNC: 217 MG/DL (ref 70–99)
GLUCOSE BLDC GLUCOMTR-MCNC: 235 MG/DL (ref 70–99)
GLUCOSE BLDC GLUCOMTR-MCNC: 259 MG/DL (ref 70–99)
HCT VFR BLD AUTO: 48.4 % (ref 40–53)
HGB BLD-MCNC: 16.5 G/DL (ref 13.3–17.7)
HOLD SPECIMEN: NORMAL
MCH RBC QN AUTO: 32.4 PG (ref 26.5–33)
MCHC RBC AUTO-ENTMCNC: 34.1 G/DL (ref 31.5–36.5)
MCV RBC AUTO: 95 FL (ref 78–100)
PLATELET # BLD AUTO: 153 10E3/UL (ref 150–450)
RBC # BLD AUTO: 5.09 10E6/UL (ref 4.4–5.9)
WBC # BLD AUTO: 5 10E3/UL (ref 4–11)

## 2024-04-07 PROCEDURE — 258N000003 HC RX IP 258 OP 636: Performed by: INTERNAL MEDICINE

## 2024-04-07 PROCEDURE — 250N000011 HC RX IP 250 OP 636: Performed by: INTERNAL MEDICINE

## 2024-04-07 PROCEDURE — 999N000156 HC STATISTIC RCP CONSULT EA 30 MIN

## 2024-04-07 PROCEDURE — 120N000001 HC R&B MED SURG/OB

## 2024-04-07 PROCEDURE — 85027 COMPLETE CBC AUTOMATED: CPT | Performed by: INTERNAL MEDICINE

## 2024-04-07 PROCEDURE — 36415 COLL VENOUS BLD VENIPUNCTURE: CPT | Performed by: INTERNAL MEDICINE

## 2024-04-07 PROCEDURE — 250N000013 HC RX MED GY IP 250 OP 250 PS 637: Performed by: INTERNAL MEDICINE

## 2024-04-07 PROCEDURE — 99233 SBSQ HOSP IP/OBS HIGH 50: CPT | Performed by: INTERNAL MEDICINE

## 2024-04-07 PROCEDURE — 250N000009 HC RX 250: Performed by: INTERNAL MEDICINE

## 2024-04-07 PROCEDURE — 82962 GLUCOSE BLOOD TEST: CPT

## 2024-04-07 PROCEDURE — 94640 AIRWAY INHALATION TREATMENT: CPT | Mod: 76

## 2024-04-07 PROCEDURE — 999N000157 HC STATISTIC RCP TIME EA 10 MIN

## 2024-04-07 PROCEDURE — 94640 AIRWAY INHALATION TREATMENT: CPT

## 2024-04-07 RX ORDER — PREDNISONE 20 MG/1
40 TABLET ORAL DAILY
Status: DISCONTINUED | OUTPATIENT
Start: 2024-04-08 | End: 2024-04-09

## 2024-04-07 RX ORDER — NICOTINE 21 MG/24HR
1 PATCH, TRANSDERMAL 24 HOURS TRANSDERMAL DAILY
Status: DISCONTINUED | OUTPATIENT
Start: 2024-04-07 | End: 2024-04-11 | Stop reason: HOSPADM

## 2024-04-07 RX ADMIN — IPRATROPIUM BROMIDE AND ALBUTEROL SULFATE 3 ML: .5; 3 SOLUTION RESPIRATORY (INHALATION) at 11:37

## 2024-04-07 RX ADMIN — IPRATROPIUM BROMIDE AND ALBUTEROL SULFATE 3 ML: .5; 3 SOLUTION RESPIRATORY (INHALATION) at 17:13

## 2024-04-07 RX ADMIN — ENOXAPARIN SODIUM 40 MG: 40 INJECTION SUBCUTANEOUS at 21:52

## 2024-04-07 RX ADMIN — LISINOPRIL 2.5 MG: 2.5 TABLET ORAL at 08:58

## 2024-04-07 RX ADMIN — AZITHROMYCIN MONOHYDRATE 500 MG: 500 INJECTION, POWDER, LYOPHILIZED, FOR SOLUTION INTRAVENOUS at 21:52

## 2024-04-07 RX ADMIN — IPRATROPIUM BROMIDE AND ALBUTEROL SULFATE 3 ML: .5; 3 SOLUTION RESPIRATORY (INHALATION) at 07:54

## 2024-04-07 RX ADMIN — UMECLIDINIUM 1 PUFF: 62.5 AEROSOL, POWDER ORAL at 08:59

## 2024-04-07 RX ADMIN — METHYLPREDNISOLONE SODIUM SUCCINATE 40 MG: 40 INJECTION, POWDER, FOR SOLUTION INTRAMUSCULAR; INTRAVENOUS at 05:35

## 2024-04-07 RX ADMIN — ATORVASTATIN CALCIUM 20 MG: 10 TABLET, FILM COATED ORAL at 08:58

## 2024-04-07 RX ADMIN — IPRATROPIUM BROMIDE AND ALBUTEROL SULFATE 3 ML: .5; 3 SOLUTION RESPIRATORY (INHALATION) at 20:56

## 2024-04-07 RX ADMIN — METHYLPREDNISOLONE SODIUM SUCCINATE 40 MG: 40 INJECTION, POWDER, FOR SOLUTION INTRAMUSCULAR; INTRAVENOUS at 11:50

## 2024-04-07 RX ADMIN — ESCITALOPRAM OXALATE 20 MG: 20 TABLET ORAL at 08:58

## 2024-04-07 RX ADMIN — NICOTINE 1 PATCH: 21 PATCH, EXTENDED RELEASE TRANSDERMAL at 16:12

## 2024-04-07 ASSESSMENT — ACTIVITIES OF DAILY LIVING (ADL)
ADLS_ACUITY_SCORE: 41
ADLS_ACUITY_SCORE: 37
ADLS_ACUITY_SCORE: 41
DEPENDENT_IADLS:: INDEPENDENT
ADLS_ACUITY_SCORE: 37
ADLS_ACUITY_SCORE: 41
ADLS_ACUITY_SCORE: 41
ADLS_ACUITY_SCORE: 37
ADLS_ACUITY_SCORE: 37
ADLS_ACUITY_SCORE: 41
ADLS_ACUITY_SCORE: 41
ADLS_ACUITY_SCORE: 37
ADLS_ACUITY_SCORE: 37
ADLS_ACUITY_SCORE: 41
ADLS_ACUITY_SCORE: 41
ADLS_ACUITY_SCORE: 37
ADLS_ACUITY_SCORE: 37
ADLS_ACUITY_SCORE: 41
ADLS_ACUITY_SCORE: 41
ADLS_ACUITY_SCORE: 37
ADLS_ACUITY_SCORE: 41
ADLS_ACUITY_SCORE: 37

## 2024-04-07 NOTE — PHARMACY-ADMISSION MEDICATION HISTORY
Pharmacist Admission Medication History    Admission medication history is complete. The information provided in this note is only as accurate as the sources available at the time of the update.    Information Source(s): Patient via in-person    Pertinent Information: Patient has not been able to obtain Trulicity 1.5 mg pen - has not had a dose in > 2 weeks.     Changes made to PTA medication list:  Added: None  Deleted: None  Changed: None    Allergies reviewed with patient and updates made in EHR: yes    Medication History Completed By: JORDI CONLEY RPH 4/6/2024 10:14 PM    PTA Med List   Medication Sig Last Dose    albuterol (PROAIR HFA/PROVENTIL HFA/VENTOLIN HFA) 108 (90 Base) MCG/ACT inhaler INHALE 1 TO 2 PUFFS BY MOUTH EVERY 4 HOURS AS NEEDED FOR SHORTNESS OF BREATH, WHEEZING OR COUGH 4/6/2024 at am    atorvastatin (LIPITOR) 20 MG tablet Take 1 tablet (20 mg) by mouth daily 4/5/2024 at am    dulaglutide (TRULICITY) 1.5 MG/0.5ML pen Inject 1.5 mg Subcutaneous every 7 days Unknown at patient has not been able to get a hold of this medication    escitalopram (LEXAPRO) 20 MG tablet Take 1 tablet (20 mg) by mouth daily 4/5/2024 at am    glipiZIDE (GLUCOTROL XL) 10 MG 24 hr tablet Take 10 mg by mouth 2 times daily 4/5/2024 at pm    lisinopril (ZESTRIL) 2.5 MG tablet Take 1 tablet by mouth once daily 4/5/2024 at am    LORazepam (ATIVAN) 0.5 MG tablet TAKE 1 TABLET(0.5 MG) BY MOUTH DAILY AS NEEDED FOR ANXIETY More than a month at prn    nicotine (NICODERM CQ) 21 MG/24HR 24 hr patch Place 1 patch onto the skin every 24 hours 4/6/2024 at am    tiotropium (SPIRIVA) 18 MCG inhaled capsule Inhale 1 capsule (18 mcg) into the lungs daily 4/6/2024 at am

## 2024-04-07 NOTE — ED TRIAGE NOTES
Pt arrived via ems. Pt reports sick since November. Remains a smoker. Reports continued to feel weak and sob.      Triage Assessment (Adult)       Row Name 04/06/24 1910          Triage Assessment    Airway WDL WDL        Respiratory WDL    Respiratory WDL X;cough     Cough Frequency frequent     Cough Type loose        Skin Circulation/Temperature WDL    Skin Circulation/Temperature WDL WDL        Cardiac WDL    Cardiac WDL WDL        Peripheral/Neurovascular WDL    Peripheral Neurovascular WDL WDL        Cognitive/Neuro/Behavioral WDL    Cognitive/Neuro/Behavioral WDL WDL

## 2024-04-07 NOTE — PLAN OF CARE
Problem: Comorbidity Management  Goal: Maintenance of COPD Symptom Control  Outcome: Progressing     Problem: Adult Inpatient Plan of Care  Goal: Optimal Comfort and Wellbeing  4/7/2024 0458 by Ciarra Cortez, RN  Outcome: Progressing  4/7/2024 0457 by Ciarra Cortez, RN  Outcome: Progressing   Goal Outcome Evaluation:    Patient AO x4, denies pain and nausea. 3L O2 on overnight, tele NSR and occasionally tachy. Dyspnea on exertion, sats recover quickly. Slept well overnight.     Cairra Cortez, RN

## 2024-04-07 NOTE — H&P
[facility]    History and Physical - Hospitalist Service       Date of Admission:  4/6/2024    Assessment & Plan      Remi Mathias is a 64 year old male admitted on 4/6/2024. He ias admitted with COPD exacerbation and acute hypoxic respiratory failure    Acute on chronic COPD exacerbation  --Will continue with DuoNebs  --Continue steroid  --Continue azithromycin and ceftriaxone      Acute on chronic hypoxic respiratory failure  COVID and flu negative  --Continue with supplemental oxygen  --ABG if needed      Thrombocytopenia  --Mild  --Trend serum platelet      Hyperglycemia in the setting of diabetes  --Insulin regimen  --Monitor blood sugars  -- Holding oral hypoglycemic      History of hypertension  -- Continue home BP regimen and monitor blood pressure          Diet: Moderate Consistent Carb (60 g CHO per Meal) Diet    DVT Prophylaxis: Enoxaparin (Lovenox) SQ  Rose Catheter: Not present  Lines: None     Cardiac Monitoring: ACTIVE order. Indication: hypoxia  Code Status: Full Code    Clinically Significant Risk Factors Present on Admission                  # Hypertension: Noted on problem list     # DMII: A1C = 7.0 % (Ref range: 0.0 - 5.6 %) within past 6 months        # COPD: noted on problem list        Disposition Plan      Expected Discharge Date: 04/08/2024                  Palmer Do MD  Hospitalist Service  [facility]  Securely message with [employer] (more info)  Text page via [employer] Paging/Directory     ______________________________________________________________________    Chief Complaint   Shortness of breath    History is obtained from the patient    History of Present Illness   Remi Mathias is a 64 year old male who presents to the ER with complaints of shortness of breath.  He endorses he had been feeling intermittently short of breath since November however on the day of admission it had gotten worse.  He denies any fever or  chills nausea vomiting or diarrhea.  He denies any chest pain headaches or myalgias.  He denies any sick contacts.  He endorses his cough is productive occasionally of a creamy milky sputum.  Workup in the ER was concerning for COPD exacerbation.  Chest x-ray did not demonstrate any infiltrates and hospitalist is consulted for admission      Past Medical History    Past Medical History:   Diagnosis Date    Allergic rhinitis     Created by Conversion  Replacement Utility updated for latest IMO load    Anxiety state     Created by Conversion  Replacement Utility updated for latest IMO load    Dermatophytosis of nail     Created by Conversion     Hyperlipidemia     Created by Conversion     Tobacco use disorder     Created by Conversion     Type 2 diabetes mellitus (H)     Created by Conversion        Past Surgical History   Past Surgical History:   Procedure Laterality Date    INCISION AND DRAINAGE OF WOUND Right 2/26/2020    Procedure: Exploration and Arthrotomy of metacarpophalangeal  joint;  Surgeon: Jany Cote MD;  Location: [facility];  Service: Orthopedics    [facility] LAP,CHOLECYSTECTOMY/EXPLORE      Description: Cholecystectomy Laparoscopic;  Recorded: 12/08/2009;       Prior to Admission Medications   Prior to Admission Medications   Prescriptions Last Dose Informant Patient Reported? Taking?   Glucose Blood (BLOOD GLUCOSE TEST STRIPS) STRP   No No   Sig: Test blood sugar once daily   LORazepam (ATIVAN) 0.5 MG tablet   No No   Sig: TAKE 1 TABLET(0.5 MG) BY MOUTH DAILY AS NEEDED FOR ANXIETY   albuterol (PROAIR HFA/PROVENTIL HFA/VENTOLIN HFA) 108 (90 Base) MCG/ACT inhaler   No No   Sig: INHALE 1 TO 2 PUFFS BY MOUTH EVERY 4 HOURS AS NEEDED FOR SHORTNESS OF BREATH, WHEEZING OR COUGH   atorvastatin (LIPITOR) 20 MG tablet   No No   Sig: Take 1 tablet (20 mg) by mouth daily   dulaglutide (TRULICITY) 0.75 MG/0.5ML pen   No No   Sig: Inject 0.75 mg Subcutaneous every 7 days   dulaglutide (TRULICITY) 1.5  MG/0.5ML pen   No No   Sig: Inject 1.5 mg Subcutaneous every 7 days   escitalopram (LEXAPRO) 20 MG tablet   No No   Sig: Take 1 tablet (20 mg) by mouth daily   glipiZIDE (GLUCOTROL XL) 10 MG 24 hr tablet   No No   Sig: Take 2 tablets by mouth once daily   lisinopril (ZESTRIL) 2.5 MG tablet   No No   Sig: Take 1 tablet by mouth once daily   nicotine (NICODERM CQ) 14 MG/24HR 24 hr patch   No No   Sig: Place 1 patch onto the skin every 24 hours   nicotine (NICODERM CQ) 21 MG/24HR 24 hr patch   No No   Sig: Place 1 patch onto the skin every 24 hours   nicotine (NICODERM CQ) 7 MG/24HR 24 hr patch   No No   Sig: Place 1 patch onto the skin every 24 hours   nicotine (NICORETTE) 2 MG gum   No No   Sig: Place 1 each (2 mg) inside cheek every hour as needed for nicotine withdrawal symptoms   tiotropium (SPIRIVA) 18 MCG inhaled capsule   No No   Sig: Inhale 1 capsule (18 mcg) into the lungs daily      Facility-Administered Medications: None        Social History   I have reviewed this patient's social history and updated it with pertinent information if needed.  Social History     Tobacco Use    Smoking status: Former     Packs/day: 0.50     Years: 45.00     Additional pack years: 0.00     Total pack years: 22.50     Types: Cigarettes     Quit date: 2023     Years since quittin.3    Smokeless tobacco: Never    Tobacco comments:     going to start nicotine patches   Vaping Use    Vaping Use: Never used   Substance Use Topics    Alcohol use: Not Currently    Drug use: No         Family History   I have reviewed this patient's family history and updated it with pertinent information if needed.  Family History   Problem Relation Age of Onset    Lung Cancer Mother     Heart Disease Father     Hypertension Father     Diabetes Sister          Allergies   Allergies   Allergen Reactions    Metformin Diarrhea     XR and IR both cause diarrhea         Physical Exam   Vital Signs: Temp: 98.6  F (37  C) Temp src: Oral BP: 120/73  Pulse: 81   Resp: 28 SpO2: 92 % O2 Device: Nasal cannula Oxygen Delivery: 2 LPM  Weight: 0 lbs 0 oz      General: Awake and alert, sitting up in bed slightly tripoding  Eyes: Pupils reactive to light  HENT: Atraumatic, oral mucosa moist  Neck: No masses, or swelling  Pulmonary: Good air entry, bilateral wheezing and coarse breath  CVS: Heart sounds 1 and 2 present, regular rhythm, rate, no murmur  GI/ Abdomen: Soft , not tender , not distended, bowel sounds ++  : No scales   CLEMENTINE: Normal inspection, no muscle spasm  Skin: No rash, skin intact  Extremities: Edema none  Neuro: Alert and oriented, follows command, speech normal, no focal weakness  Psych: Normal mood and affect      Medical Decision Making       65 MINUTES SPENT BY ME on the date of service doing chart review, history, exam, documentation & further activities per the note.      Data     I have personally reviewed the following data over the past 24 hrs:    7.2  \   16.2   / 146 (L)     139 103 9.8 /  176 (H)   3.8 24 0.70 \     Trop: N/A BNP: 80       Imaging results reviewed over the past 24 hrs:   Recent Results (from the past 24 hour(s))   XR Chest Port 1 View    Narrative    EXAM: XR CHEST PORT 1 VIEW  LOCATION: [facility]  DATE: 4/6/2024    INDICATION: Dyspnea  COMPARISON: 11/24/2023      Impression    IMPRESSION: Borderline hyperinflation of both lungs. Heart size and pulmonary vascularity within normal limits. No lung infiltrates or pneumothorax. Since 11/24/2023 the slight amount of right pleural fluid/thickening and the platelike atelectasis in the   right lung base have resolved.

## 2024-04-07 NOTE — CONSULTS
Care Management Initial Consult    General Information  Assessment completed with: Patient, Spouse or significant other, patient, spouse Nikki  Type of CM/SW Visit: Initial Assessment    Primary Care Provider verified and updated as needed: Yes   Readmission within the last 30 days: no previous admission in last 30 days      Reason for Consult: discharge planning  Advance Care Planning: Advance Care Planning Reviewed: no concerns identified          Communication Assessment  Patient's communication style: spoken language (English or Bilingual)             Cognitive  Cognitive/Neuro/Behavioral: WDL                      Living Environment:   People in home: spouse, child(alma), adult     Current living Arrangements: house      Able to return to prior arrangements: yes       Family/Social Support:  Care provided by: self  Provides care for: no one  Marital Status:   Wife, Children  Nikki       Description of Support System: Supportive, Involved    Support Assessment: Patient communicates needs well met, Adequate family and caregiver support, Adequate social supports    Current Resources:   Patient receiving home care services: No     Community Resources: None  Equipment currently used at home:    Supplies currently used at home: Diabetic Supplies    Employment/Financial:  Employment Status: employed full-time        Financial Concerns:             Does the patient's insurance plan have a 3 day qualifying hospital stay waiver?  No    Lifestyle & Psychosocial Needs:  Social Determinants of Health     Food Insecurity: High Risk (12/5/2023)    Food Insecurity     Within the past 12 months, did you worry that your food would run out before you got money to buy more?: Yes     Within the past 12 months, did the food you bought just not last and you didn t have money to get more?: Yes   Depression: Not at risk (3/6/2024)    PHQ-2     PHQ-2 Score: 0   Housing Stability: High Risk (12/5/2023)    Housing Stability     Do  you have housing? : Yes     Are you worried about losing your housing?: Yes   Tobacco Use: Medium Risk (3/6/2024)    Patient History     Smoking Tobacco Use: Former     Smokeless Tobacco Use: Never     Passive Exposure: Not on file   Financial Resource Strain: Low Risk  (12/5/2023)    Financial Resource Strain     Within the past 12 months, have you or your family members you live with been unable to get utilities (heat, electricity) when it was really needed?: No   Alcohol Use: Not on file   Transportation Needs: Low Risk  (12/5/2023)    Transportation Needs     Within the past 12 months, has lack of transportation kept you from medical appointments, getting your medicines, non-medical meetings or appointments, work, or from getting things that you need?: No   Physical Activity: Not on file   Interpersonal Safety: Low Risk  (12/5/2023)    Interpersonal Safety     Do you feel physically and emotionally safe where you currently live?: Yes     Within the past 12 months, have you been hit, slapped, kicked or otherwise physically hurt by someone?: No     Within the past 12 months, have you been humiliated or emotionally abused in other ways by your partner or ex-partner?: No   Stress: Not on file   Social Connections: Not on file       Functional Status:  Prior to admission patient needed assistance:   Dependent ADLs:: Independent  Dependent IADLs:: Independent       Mental Health Status:          Chemical Dependency Status:                Values/Beliefs:  Spiritual, Cultural Beliefs, Gnosticist Practices, Values that affect care:                 Additional Information:  Met with patient and spouse at the bedside for initial assessment. Introduced self and care management role. Patient is from home, lives with spouse and adult children. He is independent with all his I/ADLs at baseline. He works full time. He uses no DME. Anticipate home at discharge. Family to transport.     Leah Rivers RN

## 2024-04-07 NOTE — ED NOTES
Bed: JNED-02  Expected date: 4/6/24  Expected time: 6:56 PM  Means of arrival: Ambulance  Comments:  [place]- 64yof sob, hx COPD, no relief with nebs/inhaler

## 2024-04-07 NOTE — PROGRESS NOTES
"[facility]    Medicine Progress Note - Hospitalist Service    Date of Admission:  4/6/2024    Assessment & Plan      Remi Mathais is a 64 year old male admitted on 4/6/2024. He ias admitted with COPD exacerbation and acute hypoxic respiratory failure    Acute on chronic COPD exacerbation  -Will continue with DuoNebs  -Continue steroid  -Continue azithromycin and ceftriaxone    Acute on chronic hypoxic respiratory failure  COVID and flu negative  -Continue with supplemental oxygen, now on 2L  -ABG if needed    Thrombocytopenia- resolved  -Mild on admission at 146    Hyperglycemia in the setting of diabetes  -Insulin regimen  -Monitor blood sugars  -Holding oral hypoglycemic  -Hypoglycemic protocol in place  -Will need to make adjustments as his sugars have been elevated.  After 24 hours we will calculate needs    History of hypertension  - Continue home BP regimen and monitor blood pressure          Diet: Moderate Consistent Carb (60 g CHO per Meal) Diet    DVT Prophylaxis: Enoxaparin (Lovenox) SQ  Rose Catheter: Not present  Lines: None     Cardiac Monitoring: ACTIVE order. Indication: hypoxia  Code Status: Full Code      Clinically Significant Risk Factors Present on Admission                  # Hypertension: Noted on problem list     # DMII: A1C = 7.0 % (Ref range: 0.0 - 5.6 %) within past 6 months    # Obesity: Estimated body mass index is 32.11 kg/m  as calculated from the following:    Height as of this encounter: 1.702 m (5' 7\").    Weight as of this encounter: 93 kg (205 lb).       # COPD: noted on problem list        Disposition Plan     Expected Discharge Date: 04/08/2024      Destination: home with family              Santa Stone MD  Hospitalist Service  [facility]  Securely message with [employer] (more info)  Text page via [employer] Paging/Directory   ______________________________________________________________________    Interval History   Mr. " Mey is doing okay.  He has a cough and is still requiring 2 L of oxygen via nasal cannula.  We discussed his attempt to quit smoking.  He had gone down to the 7 mg patch of nicotine but is back up to the 21 because he had cravings.  I discussed with him the risk of cravings as he tapers down and that he I can provide him with some information on how to help with cravings.    Physical Exam   Vital Signs: Temp: 98.1  F (36.7  C) Temp src: Oral BP: 123/68 Pulse: 84   Resp: (!) 35 SpO2: 93 % O2 Device: Nasal cannula Oxygen Delivery: 3 LPM  Weight: 205 lbs 0 oz    General Appearance: Awake, alert, in no acute distress  Respiratory: Scattered wheeze posteriorly on the left, cough, diminished breath sounds otherwise  Cardiovascular: RRR  GI: soft, nontender, non distended, normal bowel sounds  Skin: no jaundice, no rash      Medical Decision Making       50 MINUTES SPENT BY ME on the date of service doing chart review, history, exam, documentation & further activities per the note.      Data     I have personally reviewed the following data over the past 24 hrs:    5.0  \   16.5   / 153     139 103 9.8 /  217 (H)   3.8 24 0.70 \     Trop: N/A BNP: 80       Imaging results reviewed over the past 24 hrs:   Recent Results (from the past 24 hour(s))   XR Chest Port 1 View    Narrative    EXAM: XR CHEST PORT 1 VIEW  LOCATION: [facility]  DATE: 4/6/2024    INDICATION: Dyspnea  COMPARISON: 11/24/2023      Impression    IMPRESSION: Borderline hyperinflation of both lungs. Heart size and pulmonary vascularity within normal limits. No lung infiltrates or pneumothorax. Since 11/24/2023 the slight amount of right pleural fluid/thickening and the platelike atelectasis in the   right lung base have resolved.

## 2024-04-07 NOTE — ED PROVIDER NOTES
EMERGENCY DEPARTMENT ENCOUNTER      NAME: Remi Mathias  AGE: 64 year old male  YOB: 1960  MRN: [id number]  EVALUATION DATE & TIME: 2024  7:04 PM    PCP: Rosie Dorado    ED PROVIDER: Tuan Lainez M.D.      Chief Complaint   Patient presents with    Shortness of Breath         FINAL IMPRESSION:  1. COPD with acute exacerbation (H)          ED COURSE & MEDICAL DECISION MAKIN year old male presents to the Emergency Department for evaluation of shortness of breath.  Patient with a history of COPD, admitted at [facility] a few months ago.  Presenting to the emergency department with shortness of breath.  He is not in severe respiratory distress on arrival but is requiring supplemental oxygen at about 3 to 4 L to maintain oxygen saturations in the low 90s.  He has diffuse expiratory wheezing.  Denies chest pain.  Family reports that he does seem to be coughing including productive cough which is more than normal.  He has a clear chest x-ray at this time with some hyperinflation, this was independently reviewed and interpreted.  No clinical suspicion for PE based on history, he did have a previous negative PE evaluation a few months ago during his prior admission.  He was started on usual COPD treatments with DuoNebs, magnesium, Solu-Medrol, and azithromycin given here in the emergency department.  Persistently hypoxic when reevaluated and will require admission.  Discussed case with hospitalist.    At the conclusion of the encounter I discussed the results of all of the tests and the disposition. The questions were answered. The patient or family acknowledged understanding and was agreeable with the care plan.       Medical Decision Making  Obtained supplemental history:Supplemental history obtained?: Documented in chart  Reviewed external records: External records reviewed?: Documented in chart  Care impacted by chronic illness:Diabetes, Hyperlipidemia, Mental Health, and  Smoking / Nicotine Use  Care significantly affected by social determinants of health:N/A  Did you consider but not order tests?: Work up considered but not performed and documented in chart, if applicable  Did you interpret images independently?: Independent interpretation of ECG and images noted in documentation, when applicable.  Consultation discussion with other provider:Did you involve another provider (consultant, , pharmacy, etc.)?: No  Admit.          MEDICATIONS GIVEN IN THE EMERGENCY:  Medications   azithromycin (ZITHROMAX) 500 mg in sodium chloride 0.9 % 250 mL intermittent infusion (500 mg Intravenous $New Bag 4/6/24 2143)   glucose gel 15-30 g (has no administration in time range)     Or   dextrose 50 % injection 25-50 mL (has no administration in time range)     Or   glucagon injection 1 mg (has no administration in time range)   insulin aspart (NovoLOG) injection (RAPID ACTING) (has no administration in time range)   insulin aspart (NovoLOG) injection (RAPID ACTING) (has no administration in time range)   insulin glargine (LANTUS PEN) injection 10 Units (has no administration in time range)   azithromycin (ZITHROMAX) 500 mg in sodium chloride 0.9 % 250 mL intermittent infusion (has no administration in time range)   ipratropium - albuterol 0.5 mg/2.5 mg/3 mL (DUONEB) neb solution 3 mL (has no administration in time range)   ipratropium - albuterol 0.5 mg/2.5 mg/3 mL (DUONEB) neb solution 3 mL (has no administration in time range)   methylPREDNISolone sodium succinate (SOLU-MEDROL) injection 40 mg (has no administration in time range)   lidocaine 1 % 0.1-1 mL (has no administration in time range)   lidocaine (LMX4) cream (has no administration in time range)   sodium chloride (PF) 0.9% PF flush 3 mL (3 mLs Intracatheter Not Given 4/6/24 2150)   sodium chloride (PF) 0.9% PF flush 3 mL (has no administration in time range)   senna-docusate (SENOKOT-S/PERICOLACE) 8.6-50 MG per tablet 1 tablet (has no  administration in time range)     Or   senna-docusate (SENOKOT-S/PERICOLACE) 8.6-50 MG per tablet 2 tablet (has no administration in time range)   calcium carbonate (TUMS) chewable tablet 1,000 mg (has no administration in time range)   enoxaparin ANTICOAGULANT (LOVENOX) injection 40 mg (has no administration in time range)   acetaminophen (TYLENOL) tablet 650 mg (has no administration in time range)     Or   acetaminophen (TYLENOL) Suppository 650 mg (has no administration in time range)   melatonin tablet 1 mg (has no administration in time range)   ondansetron (ZOFRAN ODT) ODT tab 4 mg (has no administration in time range)     Or   ondansetron (ZOFRAN) injection 4 mg (has no administration in time range)   polyethylene glycol (MIRALAX) Packet 17 g (has no administration in time range)   nicotine (NICODERM CQ) 14 MG/24HR 24 hr patch 1 patch (has no administration in time range)   ipratropium - albuterol 0.5 mg/2.5 mg/3 mL (DUONEB) neb solution 3 mL (3 mLs Nebulization $Given 4/6/24 1931)   magnesium sulfate 2 g in 50 mL sterile water intermittent infusion (0 g Intravenous Stopped 4/6/24 2031)   methylPREDNISolone sodium succinate (solu-MEDROL) injection 125 mg (125 mg Intravenous $Given 4/6/24 1930)   ipratropium - albuterol 0.5 mg/2.5 mg/3 mL (DUONEB) neb solution 3 mL (3 mLs Nebulization $Given 4/6/24 2124)       NEW PRESCRIPTIONS STARTED AT TODAY'S ER VISIT  New Prescriptions    No medications on file          =================================================================    HPI    Patient information was obtained from: Patient    Use of : N/A         Remi Mathias is a 64 year old male with a pertinent history of COPD, tobacco use disorder who presents to this ED  for evaluation of shortness of breath.    Patient was admitted to [facility] from 11/19- 11/27/2023 (8 days) with hypoxic respiratory failure. Patient was treated with duonebs and steroids. Chest CT on admission showed  mild bronchial wall thickening and scattered mucus plugging and emphysema background. Patient completed treatment with empiric CAP antibiotics. Patient clinically stable, discussed needs for home O2. Patient was discharged back to home.    Patient reports experiencing shortness of breath for the past few days. He notes that he has been using nebulizer treatments, without relief. He also endorses a chronic productive cough with whitish phlegm. He notes that he smokes half a pack of cigarettes a day.    Patient denies any recent chest pain, leg swelling, or fever. He denies any associating symptoms. No other complaints at this time.       REVIEW OF SYSTEMS   All systems reviewed and negative except as noted in HPI.    PAST MEDICAL HISTORY:  Past Medical History:   Diagnosis Date    Allergic rhinitis     Created by Conversion  Replacement Utility updated for latest IMO load    Anxiety state     Created by Conversion  Replacement Utility updated for latest IMO load    Dermatophytosis of nail     Created by Conversion     Hyperlipidemia     Created by Conversion     Tobacco use disorder     Created by Conversion     Type 2 diabetes mellitus (H)     Created by Conversion        PAST SURGICAL HISTORY:  Past Surgical History:   Procedure Laterality Date    INCISION AND DRAINAGE OF WOUND Right 2/26/2020    Procedure: Exploration and Arthrotomy of metacarpophalangeal  joint;  Surgeon: Jany Cote MD;  Location: [facility];  Service: Orthopedics    [facility] LAP,CHOLECYSTECTOMY/EXPLORE      Description: Cholecystectomy Laparoscopic;  Recorded: 12/08/2009;           CURRENT MEDICATIONS:    Current Facility-Administered Medications   Medication Dose Route Frequency Provider Last Rate Last Admin    acetaminophen (TYLENOL) tablet 650 mg  650 mg Oral Q4H PRN Palemr Do MD        Or    acetaminophen (TYLENOL) Suppository 650 mg  650 mg Rectal Q4H PRN Palmer Do MD        azithromycin (ZITHROMAX) 500 mg in  sodium chloride 0.9 % 250 mL intermittent infusion  500 mg Intravenous Once Tuan Lainez MD   500 mg at 04/06/24 2143    [START ON 4/7/2024] azithromycin (ZITHROMAX) 500 mg in sodium chloride 0.9 % 250 mL intermittent infusion  500 mg Intravenous Q24H Palmer Do MD        calcium carbonate (TUMS) chewable tablet 1,000 mg  1,000 mg Oral 4x Daily PRN Palmer Do MD        glucose gel 15-30 g  15-30 g Oral Q15 Min PRN Palmer Do MD        Or    dextrose 50 % injection 25-50 mL  25-50 mL Intravenous Q15 Min PRN Palmer Do MD        Or    glucagon injection 1 mg  1 mg Subcutaneous Q15 Min PRN Palmer Do MD        enoxaparin ANTICOAGULANT (LOVENOX) injection 40 mg  40 mg Subcutaneous Q24H Palmer Do MD        [START ON 4/7/2024] insulin aspart (NovoLOG) injection (RAPID ACTING)  1-7 Units Subcutaneous TID AC Palmer Do MD        insulin aspart (NovoLOG) injection (RAPID ACTING)  1-5 Units Subcutaneous At Bedtime Palmer Do MD        insulin glargine (LANTUS PEN) injection 10 Units  10 Units Subcutaneous At Bedtime Palmer Do MD        [START ON 4/7/2024] ipratropium - albuterol 0.5 mg/2.5 mg/3 mL (DUONEB) neb solution 3 mL  3 mL Nebulization Q4H WA Palmer Do MD        ipratropium - albuterol 0.5 mg/2.5 mg/3 mL (DUONEB) neb solution 3 mL  3 mL Nebulization Q2H PRN Palmer Do MD        lidocaine (LMX4) cream   Topical Q1H PRN Palmer Do MD        lidocaine 1 % 0.1-1 mL  0.1-1 mL Other Q1H PRN Palmer Do MD        melatonin tablet 1 mg  1 mg Oral At Bedtime PRN Palmer Do MD        [START ON 4/7/2024] methylPREDNISolone sodium succinate (SOLU-MEDROL) injection 40 mg  40 mg Intravenous Q6H Palmer Do MD        nicotine (NICODERM CQ) 14 MG/24HR 24 hr patch 1 patch  1 patch Transdermal Daily PRN Palmer Do MD        ondansetron (ZOFRAN ODT) ODT tab 4 mg  4 mg Oral Q6H PRN Palmer Do MD         Or    ondansetron (ZOFRAN) injection 4 mg  4 mg Intravenous Q6H PRN Palmer Do MD        polyethylene glycol (MIRALAX) Packet 17 g  17 g Oral BID PRN Palmer Do MD        senna-docusate (SENOKOT-S/PERICOLACE) 8.6-50 MG per tablet 1 tablet  1 tablet Oral BID PRN Palmer Do MD        Or    senna-docusate (SENOKOT-S/PERICOLACE) 8.6-50 MG per tablet 2 tablet  2 tablet Oral BID PRN Palmer Do MD        sodium chloride (PF) 0.9% PF flush 3 mL  3 mL Intracatheter Q8H Palmer Do MD        sodium chloride (PF) 0.9% PF flush 3 mL  3 mL Intracatheter q1 min prPalmer Franz MD         Current Outpatient Medications   Medication Sig Dispense Refill    albuterol (PROAIR HFA/PROVENTIL HFA/VENTOLIN HFA) 108 (90 Base) MCG/ACT inhaler INHALE 1 TO 2 PUFFS BY MOUTH EVERY 4 HOURS AS NEEDED FOR SHORTNESS OF BREATH, WHEEZING OR COUGH 9 g 0    atorvastatin (LIPITOR) 20 MG tablet Take 1 tablet (20 mg) by mouth daily 90 tablet 3    dulaglutide (TRULICITY) 0.75 MG/0.5ML pen Inject 0.75 mg Subcutaneous every 7 days 4 mL 0    dulaglutide (TRULICITY) 1.5 MG/0.5ML pen Inject 1.5 mg Subcutaneous every 7 days 2 mL 5    escitalopram (LEXAPRO) 20 MG tablet Take 1 tablet (20 mg) by mouth daily 90 tablet 2    glipiZIDE (GLUCOTROL XL) 10 MG 24 hr tablet Take 2 tablets by mouth once daily 180 tablet 1    Glucose Blood (BLOOD GLUCOSE TEST STRIPS) STRP Test blood sugar once daily 100 strip 11    lisinopril (ZESTRIL) 2.5 MG tablet Take 1 tablet by mouth once daily 90 tablet 3    LORazepam (ATIVAN) 0.5 MG tablet TAKE 1 TABLET(0.5 MG) BY MOUTH DAILY AS NEEDED FOR ANXIETY 15 tablet 0    nicotine (NICODERM CQ) 14 MG/24HR 24 hr patch Place 1 patch onto the skin every 24 hours 28 patch 0    nicotine (NICODERM CQ) 21 MG/24HR 24 hr patch Place 1 patch onto the skin every 24 hours 28 patch 1    nicotine (NICODERM CQ) 7 MG/24HR 24 hr patch Place 1 patch onto the skin every 24 hours 28 patch 0    nicotine (NICORETTE) 2  MG gum Place 1 each (2 mg) inside cheek every hour as needed for nicotine withdrawal symptoms 100 each 1    tiotropium (SPIRIVA) 18 MCG inhaled capsule Inhale 1 capsule (18 mcg) into the lungs daily 90 capsule 1         ALLERGIES:  Allergies   Allergen Reactions    Metformin Diarrhea     XR and IR both cause diarrhea        FAMILY HISTORY:  Family History   Problem Relation Age of Onset    Lung Cancer Mother     Heart Disease Father     Hypertension Father     Diabetes Sister        SOCIAL HISTORY:   Social History     Socioeconomic History    Marital status:    Tobacco Use    Smoking status: Former     Packs/day: 0.50     Years: 45.00     Additional pack years: 0.00     Total pack years: 22.50     Types: Cigarettes     Quit date: 2023     Years since quittin.3    Smokeless tobacco: Never    Tobacco comments:     going to start nicotine patches   Vaping Use    Vaping Use: Never used   Substance and Sexual Activity    Alcohol use: Not Currently    Drug use: No    Sexual activity: Yes     Partners: Female     Birth control/protection: None     Social Determinants of Health     Financial Resource Strain: Low Risk  (2023)    Financial Resource Strain     Within the past 12 months, have you or your family members you live with been unable to get utilities (heat, electricity) when it was really needed?: No   Food Insecurity: High Risk (2023)    Food Insecurity     Within the past 12 months, did you worry that your food would run out before you got money to buy more?: Yes     Within the past 12 months, did the food you bought just not last and you didn t have money to get more?: Yes   Transportation Needs: Low Risk  (2023)    Transportation Needs     Within the past 12 months, has lack of transportation kept you from medical appointments, getting your medicines, non-medical meetings or appointments, work, or from getting things that you need?: No   Interpersonal Safety: Low Risk  (2023)     Interpersonal Safety     Do you feel physically and emotionally safe where you currently live?: Yes     Within the past 12 months, have you been hit, slapped, kicked or otherwise physically hurt by someone?: No     Within the past 12 months, have you been humiliated or emotionally abused in other ways by your partner or ex-partner?: No   Housing Stability: High Risk (12/5/2023)    Housing Stability     Do you have housing? : Yes     Are you worried about losing your housing?: Yes       VITALS:  /73   Pulse 81   Temp 98.6  F (37  C) (Oral)   Resp 28   SpO2 92%     PHYSICAL EXAM    Constitutional: Chronically ill-appearing middle-age male patient, sitting in bed, no severe distress  HENT: Normocephalic, Atraumatic. Neck Supple.  Eyes: EOMI, Conjunctiva normal.  Respiratory: Lung sounds are diminished with expiratory wheezing throughout.  Able to speak full sentences.  No severe respiratory distress.  Cardiovascular: Normal heart rate, Regular rhythm. No peripheral edema.  Abdomen: Soft, nontender  Musculoskeletal: Good range of motion in all major joints. No major deformities noted.  Integument: Warm, Dry.  Neurologic: Alert & awake, Normal motor function, Normal sensory function, No focal deficits noted.   Psychiatric: Cooperative. Affect appropriate.     LAB:  All pertinent labs reviewed and interpreted.  Labs Ordered and Resulted from Time of ED Arrival to Time of ED Departure   BASIC METABOLIC PANEL - Abnormal       Result Value    Sodium 139      Potassium 3.8      Chloride 103      Carbon Dioxide (CO2) 24      Anion Gap 12      Urea Nitrogen 9.8      Creatinine 0.70      GFR Estimate >90      Calcium 8.9      Glucose 176 (*)    CBC WITH PLATELETS AND DIFFERENTIAL - Abnormal    WBC Count 7.2      RBC Count 5.07      Hemoglobin 16.2      Hematocrit 48.4      MCV 96      MCH 32.0      MCHC 33.5      RDW 13.3      Platelet Count 146 (*)     % Neutrophils 56      % Lymphocytes 20      % Monocytes 8       % Eosinophils 15      % Basophils 1      % Immature Granulocytes 0      NRBCs per 100 WBC 0      Absolute Neutrophils 4.1      Absolute Lymphocytes 1.5      Absolute Monocytes 0.6      Absolute Eosinophils 1.1 (*)     Absolute Basophils 0.1      Absolute Immature Granulocytes 0.0      Absolute NRBCs 0.0     N TERMINAL PRO BNP OUTPATIENT - Normal    N Terminal Pro BNP Outpatient 80     INFLUENZA A/B, RSV, & SARS-COV2 PCR - Normal    Influenza A PCR Negative      Influenza B PCR Negative      RSV PCR Negative      SARS CoV2 PCR Negative     GLUCOSE MONITOR NURSING POCT   GLUCOSE MONITOR NURSING POCT   CREATININE       RADIOLOGY:  Reviewed all pertinent imaging. Please see official radiology report.  XR Chest Port 1 View   Final Result   IMPRESSION: Borderline hyperinflation of both lungs. Heart size and pulmonary vascularity within normal limits. No lung infiltrates or pneumothorax. Since 11/24/2023 the slight amount of right pleural fluid/thickening and the platelike atelectasis in the    right lung base have resolved.          EKG:    Performed at: 04- 19:19    Impression: Sinus rhythm, right bundle branch block, inferior infarct (age undetermined)    Rate: 81 bpm  Rhythm: Sinus  Axis: 269  AR Interval: 138  QRS Interval: 128  QTc Interval: 480  ST Changes: N/A  Comparison: When compared with ECG of 11- 11:56  QT has lengthened.    I have independently reviewed and interpreted the EKG(s) documented above.        I, JOAQUIN CARBONE , am serving as a scribe to document services personally performed by Dr. Tuan Lainez, based on my observation and the provider's statements to me. I, Tuan Lainez MD attest that JOAQUIN CARBONE  is acting in a scribe capacity, has observed my performance of the services and has documented them in accordance with my direction.    Tuan Lainez M.D.  Emergency Medicine  [facility] EMERGENCY DEPARTMENT  [address]  [id number]  [phone]  Dept: [phone]       Tuan Lainez MD  04/06/24 1004

## 2024-04-07 NOTE — PROGRESS NOTES
RCAT Treatment Plan    Patient Score: 8  Patient Acuity: 4    Clinical Indication for Therapy: productive cough    Therapy Ordered: duo neb    Assessment Summary: cont current treatment    Joel Romero, RT  4/7/2024

## 2024-04-08 LAB
ANION GAP SERPL CALCULATED.3IONS-SCNC: 9 MMOL/L (ref 7–15)
BUN SERPL-MCNC: 22.4 MG/DL (ref 8–23)
CALCIUM SERPL-MCNC: 9.1 MG/DL (ref 8.8–10.2)
CHLORIDE SERPL-SCNC: 103 MMOL/L (ref 98–107)
CREAT SERPL-MCNC: 0.71 MG/DL (ref 0.67–1.17)
DEPRECATED HCO3 PLAS-SCNC: 27 MMOL/L (ref 22–29)
EGFRCR SERPLBLD CKD-EPI 2021: >90 ML/MIN/1.73M2
ERYTHROCYTE [DISTWIDTH] IN BLOOD BY AUTOMATED COUNT: 13.2 % (ref 10–15)
GLUCOSE BLDC GLUCOMTR-MCNC: 130 MG/DL (ref 70–99)
GLUCOSE BLDC GLUCOMTR-MCNC: 165 MG/DL (ref 70–99)
GLUCOSE BLDC GLUCOMTR-MCNC: 188 MG/DL (ref 70–99)
GLUCOSE BLDC GLUCOMTR-MCNC: 224 MG/DL (ref 70–99)
GLUCOSE BLDC GLUCOMTR-MCNC: 282 MG/DL (ref 70–99)
GLUCOSE SERPL-MCNC: 134 MG/DL (ref 70–99)
HCT VFR BLD AUTO: 46.9 % (ref 40–53)
HGB BLD-MCNC: 15.4 G/DL (ref 13.3–17.7)
LACTATE SERPL-SCNC: 2.7 MMOL/L (ref 0.7–2)
LACTATE SERPL-SCNC: 2.9 MMOL/L (ref 0.7–2)
MCH RBC QN AUTO: 31.7 PG (ref 26.5–33)
MCHC RBC AUTO-ENTMCNC: 32.8 G/DL (ref 31.5–36.5)
MCV RBC AUTO: 97 FL (ref 78–100)
PLATELET # BLD AUTO: 149 10E3/UL (ref 150–450)
POTASSIUM SERPL-SCNC: 4 MMOL/L (ref 3.4–5.3)
RBC # BLD AUTO: 4.86 10E6/UL (ref 4.4–5.9)
SODIUM SERPL-SCNC: 139 MMOL/L (ref 135–145)
WBC # BLD AUTO: 11.2 10E3/UL (ref 4–11)

## 2024-04-08 PROCEDURE — 250N000013 HC RX MED GY IP 250 OP 250 PS 637: Performed by: INTERNAL MEDICINE

## 2024-04-08 PROCEDURE — 250N000011 HC RX IP 250 OP 636: Performed by: INTERNAL MEDICINE

## 2024-04-08 PROCEDURE — 999N000157 HC STATISTIC RCP TIME EA 10 MIN

## 2024-04-08 PROCEDURE — 36415 COLL VENOUS BLD VENIPUNCTURE: CPT | Performed by: INTERNAL MEDICINE

## 2024-04-08 PROCEDURE — 83605 ASSAY OF LACTIC ACID: CPT | Performed by: INTERNAL MEDICINE

## 2024-04-08 PROCEDURE — 94640 AIRWAY INHALATION TREATMENT: CPT | Mod: 76

## 2024-04-08 PROCEDURE — 85027 COMPLETE CBC AUTOMATED: CPT | Performed by: INTERNAL MEDICINE

## 2024-04-08 PROCEDURE — 82962 GLUCOSE BLOOD TEST: CPT

## 2024-04-08 PROCEDURE — 250N000013 HC RX MED GY IP 250 OP 250 PS 637: Performed by: STUDENT IN AN ORGANIZED HEALTH CARE EDUCATION/TRAINING PROGRAM

## 2024-04-08 PROCEDURE — 258N000003 HC RX IP 258 OP 636: Performed by: INTERNAL MEDICINE

## 2024-04-08 PROCEDURE — 80048 BASIC METABOLIC PNL TOTAL CA: CPT | Performed by: INTERNAL MEDICINE

## 2024-04-08 PROCEDURE — 99233 SBSQ HOSP IP/OBS HIGH 50: CPT | Performed by: INTERNAL MEDICINE

## 2024-04-08 PROCEDURE — 120N000001 HC R&B MED SURG/OB

## 2024-04-08 PROCEDURE — 250N000012 HC RX MED GY IP 250 OP 636 PS 637: Performed by: INTERNAL MEDICINE

## 2024-04-08 PROCEDURE — 250N000009 HC RX 250: Performed by: INTERNAL MEDICINE

## 2024-04-08 RX ORDER — SODIUM CHLORIDE 9 MG/ML
INJECTION, SOLUTION INTRAVENOUS CONTINUOUS
Status: DISCONTINUED | OUTPATIENT
Start: 2024-04-08 | End: 2024-04-11

## 2024-04-08 RX ADMIN — IPRATROPIUM BROMIDE AND ALBUTEROL SULFATE 3 ML: .5; 3 SOLUTION RESPIRATORY (INHALATION) at 16:56

## 2024-04-08 RX ADMIN — ATORVASTATIN CALCIUM 20 MG: 10 TABLET, FILM COATED ORAL at 07:50

## 2024-04-08 RX ADMIN — PREDNISONE 40 MG: 20 TABLET ORAL at 07:51

## 2024-04-08 RX ADMIN — UMECLIDINIUM 1 PUFF: 62.5 AEROSOL, POWDER ORAL at 07:52

## 2024-04-08 RX ADMIN — IPRATROPIUM BROMIDE AND ALBUTEROL SULFATE 3 ML: .5; 3 SOLUTION RESPIRATORY (INHALATION) at 08:42

## 2024-04-08 RX ADMIN — IPRATROPIUM BROMIDE AND ALBUTEROL SULFATE 3 ML: .5; 3 SOLUTION RESPIRATORY (INHALATION) at 12:42

## 2024-04-08 RX ADMIN — ESCITALOPRAM OXALATE 20 MG: 20 TABLET ORAL at 07:50

## 2024-04-08 RX ADMIN — SODIUM CHLORIDE: 9 INJECTION, SOLUTION INTRAVENOUS at 18:43

## 2024-04-08 RX ADMIN — IPRATROPIUM BROMIDE AND ALBUTEROL SULFATE 3 ML: .5; 3 SOLUTION RESPIRATORY (INHALATION) at 19:32

## 2024-04-08 RX ADMIN — AZITHROMYCIN MONOHYDRATE 500 MG: 500 INJECTION, POWDER, LYOPHILIZED, FOR SOLUTION INTRAVENOUS at 20:15

## 2024-04-08 RX ADMIN — LISINOPRIL 2.5 MG: 2.5 TABLET ORAL at 07:51

## 2024-04-08 RX ADMIN — NICOTINE 1 PATCH: 21 PATCH, EXTENDED RELEASE TRANSDERMAL at 07:57

## 2024-04-08 RX ADMIN — ENOXAPARIN SODIUM 40 MG: 40 INJECTION SUBCUTANEOUS at 22:54

## 2024-04-08 RX ADMIN — Medication 1 LOZENGE: at 23:41

## 2024-04-08 ASSESSMENT — ACTIVITIES OF DAILY LIVING (ADL)
ADLS_ACUITY_SCORE: 20
ADLS_ACUITY_SCORE: 41
ADLS_ACUITY_SCORE: 20
ADLS_ACUITY_SCORE: 37
ADLS_ACUITY_SCORE: 41
ADLS_ACUITY_SCORE: 41
ADLS_ACUITY_SCORE: 20
ADLS_ACUITY_SCORE: 41
ADLS_ACUITY_SCORE: 41
ADLS_ACUITY_SCORE: 20
ADLS_ACUITY_SCORE: 41
ADLS_ACUITY_SCORE: 41
ADLS_ACUITY_SCORE: 20
ADLS_ACUITY_SCORE: 41

## 2024-04-08 NOTE — PROGRESS NOTES
"[facility]    Medicine Progress Note - Hospitalist Service    Date of Admission:  4/6/2024    Assessment & Plan   Remi Mathias is a 64 year old male admitted on 4/6/2024. He ias admitted with COPD exacerbation and acute hypoxic respiratory failure    Acute on chronic COPD exacerbation- improving  -COVID flu and RSV negative  -Will continue with DuoNebs  -Continue steroid, now on oral  -Continue azithromycin and ceftriaxone  -Will need nebulizer and supplies at discharge    Acute on chronic hypoxic respiratory failure  -Continue with supplemental oxygen, now on 2L-having difficult time weaning this today    Thrombocytopenia- resolved  -Mild on admission at 146    Diabetes mellitus type 2  Hyperglycemia-steroid-induced  -Most recent A1C 7.0% 3/6/24  -Insulin regimen: 1: 16 carb coverage, Lantus 18 units at bedtime, moderate sliding scale  -Monitor blood sugars  -Holding oral hypoglycemic  -Hypoglycemic protocol in place    History of hypertension  - Continue home BP regimen and monitor blood pressure    Obesity  -BMI 31        Diet: Moderate Consistent Carb (60 g CHO per Meal) Diet    DVT Prophylaxis: Enoxaparin (Lovenox) SQ  Rose Catheter: Not present  Lines: None     Cardiac Monitoring: None  Code Status: Full Code      Clinically Significant Risk Factors                  # Hypertension: Noted on problem list       # DMII: A1C = 7.0 % (Ref range: 0.0 - 5.6 %) within past 6 months, PRESENT ON ADMISSION  # Obesity: Estimated body mass index is 31.39 kg/m  as calculated from the following:    Height as of this encounter: 1.702 m (5' 7\").    Weight as of this encounter: 90.9 kg (200 lb 6.4 oz)., PRESENT ON ADMISSION     # COPD: noted on problem list        Disposition Plan      Expected Discharge Date: 04/09/2024      Destination: home with family              Santa Stone MD  Hospitalist Service  [facility]  Securely message with [employer] (more info)  Text " page via [facility] Paging/Directory   ______________________________________________________________________    Interval History   Mr. Mathias has an increasing cough today but his lungs sound more open.  I told him to expect this.  His only complaint is that he is having bloody boogers.  Will have humidity placed for nasal cannula.  Saline ordered for nasal spray.  His lactic is a little elevated this afternoon so we will give some gentle fluids overnight and recheck in the morning.  He is already on antibiotics.  Discussed with wife and him at bedside    Physical Exam   Vital Signs: Temp: 98.6  F (37  C) Temp src: Oral BP: 121/64 Pulse: 99   Resp: 20 SpO2: 92 % O2 Device: Nasal cannula Oxygen Delivery: 2 LPM  Weight: 200 lbs 6.4 oz    General Appearance: Awake, alert, in no acute distress  Respiratory: Diminished but sound more open in the bilateral bases than yesterday, robust productive cough  Cardiovascular: Tachycardia  GI: soft, nontender, non distended, normal bowel sounds  Skin: no jaundice, no rash      Medical Decision Making       60 MINUTES SPENT BY ME on the date of service doing chart review, history, exam, documentation & further activities per the note.      Data     I have personally reviewed the following data over the past 24 hrs:    11.2 (H)  \   15.4   / 149 (L)     139 103 22.4 /  282 (H)   4.0 27 0.71 \     Procal: N/A CRP: N/A Lactic Acid: 2.7 (H)         Imaging results reviewed over the past 24 hrs:   No results found for this or any previous visit (from the past 24 hour(s)).

## 2024-04-08 NOTE — PROVIDER NOTIFICATION
Pt triggered sepsis which was drawn and came back 2.7. MD notified and got and order for normal saline infusion at 50ml.

## 2024-04-08 NOTE — PLAN OF CARE
Able to wean oxygen to room air- currently 92% (awake, at rest)  denies pain. Eating and drinking well. Continues to have loose non-productive cough. Nicotine patch on- denies cravings currently.  Will be transfering to room 127. Report called to KARYN GAGNON RN all belongings sent including clothing and cell phone and . Mahendra will call his family to inform them of room change. Zulma Sy RN  .

## 2024-04-08 NOTE — PLAN OF CARE
Problem: Comorbidity Management  Goal: Maintenance of COPD Symptom Control  Outcome: Progressing  Goal: Blood Glucose Levels Within Targeted Range  Outcome: Progressing     Problem: Gas Exchange Impaired  Goal: Optimal Gas Exchange  Outcome: Progressing     Problem: Pain Acute  Goal: Optimal Pain Control and Function  Outcome: Progressing   Goal Outcome Evaluation:  Patient AOX4, able to make needs known. Denies pain. Dry, non productive cough. On 3L O2- NC. On tele, NSR with BBB. Slept well overnight.                        Statement Selected

## 2024-04-08 NOTE — PLAN OF CARE
Problem: Adult Inpatient Plan of Care  Goal: Absence of Hospital-Acquired Illness or Injury  Intervention: Prevent Skin Injury  Recent Flowsheet Documentation  Taken 4/7/2024 1515 by Shoaib De Souza, RN  Body Position: position changed independently  Taken 4/7/2024 1145 by Shoaib De Souza, RN  Body Position: position changed independently  Taken 4/7/2024 0700 by Shoaib De Souza, RN  Body Position: position changed independently     Problem: Comorbidity Management  Goal: Maintenance of COPD Symptom Control  Outcome: Progressing  Intervention: Maintain COPD Symptom Control  Recent Flowsheet Documentation  Taken 4/7/2024 1515 by Shoaib De Souza, RN  Medication Review/Management: medications reviewed  Taken 4/7/2024 1145 by Shoaib De Souza, RN  Medication Review/Management: medications reviewed  Taken 4/7/2024 0800 by Shoaib De Souza RN  Medication Review/Management: medications reviewed   Goal Outcome Evaluation:    Patient A&Ox4. Flat affect at times. Able to wean O2 from 3L to 2L throughout shift. Denies pain. Voids independently without issue. Telemetry; normal sinus rhythm with BBB. Ate % of meals. Sliding scale insulin with meals administered.

## 2024-04-09 LAB
ANION GAP SERPL CALCULATED.3IONS-SCNC: 9 MMOL/L (ref 7–15)
BUN SERPL-MCNC: 20.4 MG/DL (ref 8–23)
CALCIUM SERPL-MCNC: 8.7 MG/DL (ref 8.8–10.2)
CHLORIDE SERPL-SCNC: 104 MMOL/L (ref 98–107)
CREAT SERPL-MCNC: 0.81 MG/DL (ref 0.67–1.17)
DEPRECATED HCO3 PLAS-SCNC: 28 MMOL/L (ref 22–29)
EGFRCR SERPLBLD CKD-EPI 2021: >90 ML/MIN/1.73M2
ERYTHROCYTE [DISTWIDTH] IN BLOOD BY AUTOMATED COUNT: 13.3 % (ref 10–15)
GLUCOSE BLDC GLUCOMTR-MCNC: 130 MG/DL (ref 70–99)
GLUCOSE BLDC GLUCOMTR-MCNC: 153 MG/DL (ref 70–99)
GLUCOSE BLDC GLUCOMTR-MCNC: 190 MG/DL (ref 70–99)
GLUCOSE BLDC GLUCOMTR-MCNC: 230 MG/DL (ref 70–99)
GLUCOSE BLDC GLUCOMTR-MCNC: 238 MG/DL (ref 70–99)
GLUCOSE SERPL-MCNC: 113 MG/DL (ref 70–99)
HCT VFR BLD AUTO: 45.7 % (ref 40–53)
HGB BLD-MCNC: 14.8 G/DL (ref 13.3–17.7)
MCH RBC QN AUTO: 31.8 PG (ref 26.5–33)
MCHC RBC AUTO-ENTMCNC: 32.4 G/DL (ref 31.5–36.5)
MCV RBC AUTO: 98 FL (ref 78–100)
PLATELET # BLD AUTO: 137 10E3/UL (ref 150–450)
POTASSIUM SERPL-SCNC: 4 MMOL/L (ref 3.4–5.3)
RBC # BLD AUTO: 4.65 10E6/UL (ref 4.4–5.9)
SODIUM SERPL-SCNC: 141 MMOL/L (ref 135–145)
WBC # BLD AUTO: 9.1 10E3/UL (ref 4–11)

## 2024-04-09 PROCEDURE — 250N000013 HC RX MED GY IP 250 OP 250 PS 637: Performed by: INTERNAL MEDICINE

## 2024-04-09 PROCEDURE — 250N000011 HC RX IP 250 OP 636: Performed by: INTERNAL MEDICINE

## 2024-04-09 PROCEDURE — 94640 AIRWAY INHALATION TREATMENT: CPT | Mod: 76

## 2024-04-09 PROCEDURE — 999N000157 HC STATISTIC RCP TIME EA 10 MIN

## 2024-04-09 PROCEDURE — 99233 SBSQ HOSP IP/OBS HIGH 50: CPT | Performed by: INTERNAL MEDICINE

## 2024-04-09 PROCEDURE — 120N000001 HC R&B MED SURG/OB

## 2024-04-09 PROCEDURE — 36415 COLL VENOUS BLD VENIPUNCTURE: CPT | Performed by: INTERNAL MEDICINE

## 2024-04-09 PROCEDURE — 82374 ASSAY BLOOD CARBON DIOXIDE: CPT | Performed by: INTERNAL MEDICINE

## 2024-04-09 PROCEDURE — 250N000009 HC RX 250: Performed by: INTERNAL MEDICINE

## 2024-04-09 PROCEDURE — 85027 COMPLETE CBC AUTOMATED: CPT | Performed by: INTERNAL MEDICINE

## 2024-04-09 PROCEDURE — 250N000013 HC RX MED GY IP 250 OP 250 PS 637: Performed by: STUDENT IN AN ORGANIZED HEALTH CARE EDUCATION/TRAINING PROGRAM

## 2024-04-09 PROCEDURE — 258N000003 HC RX IP 258 OP 636: Performed by: INTERNAL MEDICINE

## 2024-04-09 PROCEDURE — 250N000012 HC RX MED GY IP 250 OP 636 PS 637: Performed by: INTERNAL MEDICINE

## 2024-04-09 RX ORDER — METHYLPREDNISOLONE SODIUM SUCCINATE 40 MG/ML
40 INJECTION, POWDER, LYOPHILIZED, FOR SOLUTION INTRAMUSCULAR; INTRAVENOUS EVERY 8 HOURS
Status: DISCONTINUED | OUTPATIENT
Start: 2024-04-09 | End: 2024-04-09

## 2024-04-09 RX ORDER — METHYLPREDNISOLONE SODIUM SUCCINATE 40 MG/ML
40 INJECTION, POWDER, LYOPHILIZED, FOR SOLUTION INTRAMUSCULAR; INTRAVENOUS EVERY 8 HOURS
Status: DISCONTINUED | OUTPATIENT
Start: 2024-04-09 | End: 2024-04-10

## 2024-04-09 RX ADMIN — METHYLPREDNISOLONE SODIUM SUCCINATE 40 MG: 40 INJECTION, POWDER, FOR SOLUTION INTRAMUSCULAR; INTRAVENOUS at 14:31

## 2024-04-09 RX ADMIN — INSULIN ASPART 1 UNITS: 100 INJECTION, SOLUTION INTRAVENOUS; SUBCUTANEOUS at 21:49

## 2024-04-09 RX ADMIN — ENOXAPARIN SODIUM 40 MG: 40 INJECTION SUBCUTANEOUS at 21:49

## 2024-04-09 RX ADMIN — Medication 1 LOZENGE: at 08:23

## 2024-04-09 RX ADMIN — NICOTINE 1 PATCH: 21 PATCH, EXTENDED RELEASE TRANSDERMAL at 08:28

## 2024-04-09 RX ADMIN — UMECLIDINIUM 1 PUFF: 62.5 AEROSOL, POWDER ORAL at 08:26

## 2024-04-09 RX ADMIN — ESCITALOPRAM OXALATE 20 MG: 20 TABLET ORAL at 08:23

## 2024-04-09 RX ADMIN — METHYLPREDNISOLONE SODIUM SUCCINATE 40 MG: 40 INJECTION, POWDER, FOR SOLUTION INTRAMUSCULAR; INTRAVENOUS at 21:49

## 2024-04-09 RX ADMIN — SODIUM CHLORIDE: 9 INJECTION, SOLUTION INTRAVENOUS at 15:14

## 2024-04-09 RX ADMIN — ATORVASTATIN CALCIUM 20 MG: 10 TABLET, FILM COATED ORAL at 08:23

## 2024-04-09 RX ADMIN — PREDNISONE 40 MG: 20 TABLET ORAL at 08:23

## 2024-04-09 RX ADMIN — SALINE NASAL SPRAY 1 SPRAY: 1.5 SOLUTION NASAL at 17:55

## 2024-04-09 RX ADMIN — IPRATROPIUM BROMIDE AND ALBUTEROL SULFATE 3 ML: .5; 3 SOLUTION RESPIRATORY (INHALATION) at 11:47

## 2024-04-09 RX ADMIN — ACETAMINOPHEN 650 MG: 325 TABLET ORAL at 15:28

## 2024-04-09 RX ADMIN — Medication 1 LOZENGE: at 17:54

## 2024-04-09 RX ADMIN — AZITHROMYCIN MONOHYDRATE 500 MG: 500 INJECTION, POWDER, LYOPHILIZED, FOR SOLUTION INTRAVENOUS at 21:49

## 2024-04-09 RX ADMIN — IPRATROPIUM BROMIDE AND ALBUTEROL SULFATE 3 ML: .5; 3 SOLUTION RESPIRATORY (INHALATION) at 20:33

## 2024-04-09 RX ADMIN — IPRATROPIUM BROMIDE AND ALBUTEROL SULFATE 3 ML: .5; 3 SOLUTION RESPIRATORY (INHALATION) at 16:07

## 2024-04-09 RX ADMIN — SALINE NASAL SPRAY 1 SPRAY: 1.5 SOLUTION NASAL at 08:28

## 2024-04-09 RX ADMIN — Medication 1 LOZENGE: at 10:11

## 2024-04-09 RX ADMIN — LISINOPRIL 2.5 MG: 2.5 TABLET ORAL at 08:23

## 2024-04-09 RX ADMIN — IPRATROPIUM BROMIDE AND ALBUTEROL SULFATE 3 ML: .5; 3 SOLUTION RESPIRATORY (INHALATION) at 07:49

## 2024-04-09 ASSESSMENT — ACTIVITIES OF DAILY LIVING (ADL)
ADLS_ACUITY_SCORE: 20

## 2024-04-09 NOTE — PLAN OF CARE
Problem: Comorbidity Management  Goal: Maintenance of COPD Symptom Control  Outcome: Progressing  Intervention: Maintain COPD Symptom Control  Recent Flowsheet Documentation  Taken 4/9/2024 1655 by Mica Torres RN  Medication Review/Management: medications reviewed  Taken 4/9/2024 1220 by Mica Torres RN  Medication Review/Management: medications reviewed  Taken 4/9/2024 0800 by Mica Torres RN  Medication Review/Management: medications reviewed     Problem: Gas Exchange Impaired  Goal: Optimal Gas Exchange  Outcome: Progressing   Goal Outcome Evaluation:         Pt continues to be alert and denies any pain. Pt Oxygen was titrated down to 4L NC. Pt saturating between 88% and 90%.Pt' sore throat was managed with PRN lozenge. Nasal spray helped control the nasal congestions. BG received due coverage. Pt's wife visited at bedside.

## 2024-04-09 NOTE — PROGRESS NOTES
"[facility]    Medicine Progress Note - Hospitalist Service    Date of Admission:  4/6/2024    Assessment & Plan   Remi Mathias is a 64 year old male admitted on 4/6/2024. He ias admitted with COPD exacerbation and acute hypoxic respiratory failure    Acute on chronic COPD exacerbation  Acute on chronic hypoxic respiratory failure  -Has been needing 3 to 4 L at night and 2 L during the day, may need to go with temporary oxygen as needs are fluctuating  -COVID flu and RSV negative  -Will continue with DuoNebs  -Switching back to IV as oxygen needs have escalated  -Continue azithromycin and ceftriaxone  -Will need nebulizer and supplies at discharge    Thrombocytopenia- resolved  -Mild on admission at 146    Diabetes mellitus type 2  Hyperglycemia-steroid-induced  -Most recent A1C 7.0% 3/6/24  -Insulin regimen: 1: 16 carb coverage, Lantus 18 units at bedtime, moderate sliding scale  -Monitor blood sugars  -Holding oral hypoglycemic  -Hypoglycemic protocol in place    History of hypertension  - Continue home BP regimen and monitor blood pressure    Obesity  -BMI 31        Diet: Moderate Consistent Carb (60 g CHO per Meal) Diet    DVT Prophylaxis: Enoxaparin (Lovenox) SQ  Rose Catheter: Not present  Lines: None     Cardiac Monitoring: None  Code Status: Full Code      Clinically Significant Risk Factors                  # Hypertension: Noted on problem list       # DMII: A1C = 7.0 % (Ref range: 0.0 - 5.6 %) within past 6 months, PRESENT ON ADMISSION  # Obesity: Estimated body mass index is 31.39 kg/m  as calculated from the following:    Height as of this encounter: 1.702 m (5' 7\").    Weight as of this encounter: 90.9 kg (200 lb 6.4 oz)., PRESENT ON ADMISSION     # COPD: noted on problem list        Disposition Plan      Expected Discharge Date: 04/10/2024      Destination: home with family              Santa Stone MD  Hospitalist Service  [facility] " [facility]  Securely message with [employer] (more info)  Text page via [facility] Paging/Directory   ______________________________________________________________________    Interval History   Mr. Mathias is not doing any better today. He continues to have a coarse productive cough. He is requiring more O2. Will switch back to IV steroid for another day or so.  Have given him the information so I can fill out his short-term disability which his company should be sending over sometime today.    Physical Exam   Vital Signs: Temp: 98.2  F (36.8  C) Temp src: Oral BP: 127/75 Pulse: 86   Resp: 20 SpO2: 90 % O2 Device: Nasal cannula Oxygen Delivery: 4 LPM  Weight: 200 lbs 6.4 oz    General Appearance: Awake, alert, in no acute distress  Respiratory: Decreased breath sounds bilaterally, open more in the bases  Cardiovascular: Regular rate  GI: soft, nontender, non distended, normal bowel sounds  Skin: no jaundice, no rash      Medical Decision Making       45 MINUTES SPENT BY ME on the date of service doing chart review, history, exam, documentation & further activities per the note.      Data     I have personally reviewed the following data over the past 24 hrs:    9.1  \   14.8   / 137 (L)     141 104 20.4 /  190 (H)   4.0 28 0.81 \     Procal: N/A CRP: N/A Lactic Acid: 2.9 (H)         Imaging results reviewed over the past 24 hrs:   No results found for this or any previous visit (from the past 24 hour(s)).

## 2024-04-09 NOTE — PLAN OF CARE
Problem: Comorbidity Management  Goal: Maintenance of COPD Symptom Control  Outcome: Progressing  Intervention: Maintain COPD Symptom Control  Recent Flowsheet Documentation  Taken 4/8/2024 1635 by Mica Torres RN  Medication Review/Management: medications reviewed  Taken 4/8/2024 1200 by Mica Torres, RN  Medication Review/Management: medications reviewed     Problem: Gas Exchange Impaired  Goal: Optimal Gas Exchange  Outcome: Progressing   Goal Outcome Evaluation:         Pt AXO able to make his needs known. Pt denies any pain and ambulated self to the bathroom. Pt was at 88% Oxygen saturation on Room air. Pt was given 2L NC and sat came up to 90%. Pt was later titrated down to 1 L NC and Oxygen staying slightly above 90%.BG received due coverages.

## 2024-04-09 NOTE — PLAN OF CARE
Problem: Comorbidity Management  Goal: Maintenance of COPD Symptom Control  Intervention: Maintain COPD Symptom Control  Recent Flowsheet Documentation  Taken 4/9/2024 0000 by Jacey Carranza, RN  Medication Review/Management: medications reviewed  Taken 4/8/2024 2000 by Jacey Carranza RN  Medication Review/Management: medications reviewed   Goal Outcome Evaluation:  Mahendra Buck. No telemetry. SBA with IV pole and O2 tubing. C/O sore throat, lozenge ordered and given.     Remained on 3-4L NC overnight sats remain 88%-92%. Continuous pulse oximeter in place. Desat with activity, desat with significant talking. Does not recover easily. While sleeping can sat up to 95%. Tachycardic with activity up to 130s at times. Congested, loud, coarse frequent cough. Encouraged to cough up secretions.     Increasing lactic last evening 2.9. Discussed with cross cover.

## 2024-04-10 LAB
GLUCOSE BLDC GLUCOMTR-MCNC: 200 MG/DL (ref 70–99)
GLUCOSE BLDC GLUCOMTR-MCNC: 211 MG/DL (ref 70–99)
GLUCOSE BLDC GLUCOMTR-MCNC: 215 MG/DL (ref 70–99)
GLUCOSE BLDC GLUCOMTR-MCNC: 220 MG/DL (ref 70–99)
GLUCOSE BLDC GLUCOMTR-MCNC: 274 MG/DL (ref 70–99)

## 2024-04-10 PROCEDURE — 999N000156 HC STATISTIC RCP CONSULT EA 30 MIN

## 2024-04-10 PROCEDURE — 250N000012 HC RX MED GY IP 250 OP 636 PS 637: Performed by: INTERNAL MEDICINE

## 2024-04-10 PROCEDURE — 999N000157 HC STATISTIC RCP TIME EA 10 MIN

## 2024-04-10 PROCEDURE — 250N000013 HC RX MED GY IP 250 OP 250 PS 637: Performed by: INTERNAL MEDICINE

## 2024-04-10 PROCEDURE — 250N000009 HC RX 250: Performed by: INTERNAL MEDICINE

## 2024-04-10 PROCEDURE — 250N000011 HC RX IP 250 OP 636: Performed by: INTERNAL MEDICINE

## 2024-04-10 PROCEDURE — G0463 HOSPITAL OUTPT CLINIC VISIT: HCPCS

## 2024-04-10 PROCEDURE — 94640 AIRWAY INHALATION TREATMENT: CPT

## 2024-04-10 PROCEDURE — 94640 AIRWAY INHALATION TREATMENT: CPT | Mod: 76

## 2024-04-10 PROCEDURE — 99233 SBSQ HOSP IP/OBS HIGH 50: CPT | Performed by: INTERNAL MEDICINE

## 2024-04-10 PROCEDURE — 120N000001 HC R&B MED SURG/OB

## 2024-04-10 RX ORDER — PREDNISONE 20 MG/1
40 TABLET ORAL DAILY
Status: DISCONTINUED | OUTPATIENT
Start: 2024-04-10 | End: 2024-04-11 | Stop reason: HOSPADM

## 2024-04-10 RX ADMIN — INSULIN ASPART 1 UNITS: 100 INJECTION, SOLUTION INTRAVENOUS; SUBCUTANEOUS at 21:29

## 2024-04-10 RX ADMIN — ESCITALOPRAM OXALATE 20 MG: 20 TABLET ORAL at 09:08

## 2024-04-10 RX ADMIN — NICOTINE 1 PATCH: 21 PATCH, EXTENDED RELEASE TRANSDERMAL at 09:08

## 2024-04-10 RX ADMIN — SALINE NASAL SPRAY 1 SPRAY: 1.5 SOLUTION NASAL at 09:11

## 2024-04-10 RX ADMIN — IPRATROPIUM BROMIDE AND ALBUTEROL SULFATE 3 ML: .5; 3 SOLUTION RESPIRATORY (INHALATION) at 07:44

## 2024-04-10 RX ADMIN — ATORVASTATIN CALCIUM 20 MG: 10 TABLET, FILM COATED ORAL at 09:08

## 2024-04-10 RX ADMIN — IPRATROPIUM BROMIDE AND ALBUTEROL SULFATE 3 ML: .5; 3 SOLUTION RESPIRATORY (INHALATION) at 15:27

## 2024-04-10 RX ADMIN — IPRATROPIUM BROMIDE AND ALBUTEROL SULFATE 3 ML: .5; 3 SOLUTION RESPIRATORY (INHALATION) at 20:46

## 2024-04-10 RX ADMIN — ENOXAPARIN SODIUM 40 MG: 40 INJECTION SUBCUTANEOUS at 21:28

## 2024-04-10 RX ADMIN — IPRATROPIUM BROMIDE AND ALBUTEROL SULFATE 3 ML: .5; 3 SOLUTION RESPIRATORY (INHALATION) at 00:17

## 2024-04-10 RX ADMIN — LISINOPRIL 2.5 MG: 2.5 TABLET ORAL at 09:09

## 2024-04-10 RX ADMIN — UMECLIDINIUM 1 PUFF: 62.5 AEROSOL, POWDER ORAL at 09:09

## 2024-04-10 RX ADMIN — METHYLPREDNISOLONE SODIUM SUCCINATE 40 MG: 40 INJECTION, POWDER, FOR SOLUTION INTRAMUSCULAR; INTRAVENOUS at 05:36

## 2024-04-10 RX ADMIN — PREDNISONE 40 MG: 20 TABLET ORAL at 12:53

## 2024-04-10 RX ADMIN — IPRATROPIUM BROMIDE AND ALBUTEROL SULFATE 3 ML: .5; 3 SOLUTION RESPIRATORY (INHALATION) at 12:14

## 2024-04-10 ASSESSMENT — ACTIVITIES OF DAILY LIVING (ADL)
ADLS_ACUITY_SCORE: 20

## 2024-04-10 NOTE — PLAN OF CARE
Problem: Gas Exchange Impaired  Goal: Optimal Gas Exchange  Outcome: Progressing     Problem: Comorbidity Management  Goal: Blood Glucose Levels Within Targeted Range  Outcome: Progressing   Goal Outcome Evaluation:  Mahendra seemed to have a much better night last night than Monday night. Still remains on 4L NC however he is not desatting nearly as much as before. His cough seems to have improved as well. Less anxious and appeared to sleep better. Sats 90-92% on 4L NC, we did not wean O2 overnight. While sleeping O2 sats go up to 95%. Tachycardia improved. He is concerned about his blood sugars, we discussed how steroids can affect that.    A&Ox4, withdrawn behavior. SBA with equipment help. Denied pain. No telemetry.

## 2024-04-10 NOTE — PLAN OF CARE
Problem: Comorbidity Management  Goal: Maintenance of COPD Symptom Control  Outcome: Progressing  Intervention: Maintain COPD Symptom Control  Recent Flowsheet Documentation  Taken 4/10/2024 0845 by Mica Torres RN  Medication Review/Management: medications reviewed     Problem: Gas Exchange Impaired  Goal: Optimal Gas Exchange  Outcome: Progressing  Intervention: Optimize Oxygenation and Ventilation  Recent Flowsheet Documentation  Taken 4/10/2024 0845 by Mica Torres RN  Head of Bed (HOB) Positioning: HOB at 60-90 degrees   Goal Outcome Evaluation:         Pt's Oxygen was turned up to 5 L as sat was noted to be 88% and not coming up.

## 2024-04-10 NOTE — PROGRESS NOTES
[facility] Treatment Plan    Patient Score: 6  Patient Acuity: 4    Clinical Indication for Therapy: history of mucous producing disease    Therapy Ordered: Duoneb QID     Assessment Summary: Patient awake alert and able to speak full sentences, and does not appear to be in any respiratory distress. BS diminished throughout before and after. No change with treatment. Patient tolerated treatment well with no adverse reaction. Will do reassessment in 7 days or sooner if status changes. Patient will be kept on Duoneb QID with a prn available.     Kimberly Gage, RT  4/10/2024

## 2024-04-10 NOTE — PROGRESS NOTES
"[facility]    Medicine Progress Note - Hospitalist Service    Date of Admission:  4/6/2024    Assessment & Plan   Remi Mathias is a 64 year old male admitted on 4/6/2024. He ias admitted with COPD exacerbation and acute hypoxic respiratory failure    Acute on chronic COPD exacerbation  Acute on chronic hypoxic respiratory failure  -Has been needing 3 to 4 L at night and 2 L-4L during the day, may need to go with temporary oxygen as needs are fluctuating  -COVID flu and RSV negative  -Will continue with DuoNebs  -Now on oral steroid  -Continue azithromycin and ceftriaxone  -Will need nebulizer and supplies at discharge    Thrombocytopenia- resolved  -Mild on admission at 146    Diabetes mellitus type 2  Hyperglycemia-steroid-induced  -Most recent A1C 7.0% 3/6/24  -Insulin regimen: 1: 16 carb coverage, Lantus 18 units at bedtime, moderate sliding scale  -Monitor blood sugars  -Holding oral hypoglycemic  -Hypoglycemic protocol in place    History of hypertension  -Continue home BP regimen and monitor blood pressure    Obesity  -BMI 31        Diet: Moderate Consistent Carb (60 g CHO per Meal) Diet    DVT Prophylaxis: Enoxaparin (Lovenox) SQ  Rose Catheter: Not present  Lines: None     Cardiac Monitoring: None  Code Status: Full Code      Clinically Significant Risk Factors                  # Hypertension: Noted on problem list       # DMII: A1C = 7.0 % (Ref range: 0.0 - 5.6 %) within past 6 months, PRESENT ON ADMISSION  # Obesity: Estimated body mass index is 31.39 kg/m  as calculated from the following:    Height as of this encounter: 1.702 m (5' 7\").    Weight as of this encounter: 90.9 kg (200 lb 6.4 oz)., PRESENT ON ADMISSION     # COPD: noted on problem list        Disposition Plan     Medically Ready for Discharge: Anticipated Tomorrow             Santa Stone MD  Hospitalist Service  [facility]  Securely message with [employer] (more info)  Text page via " [facility] Paging/Directory   ______________________________________________________________________    Interval History   Mr. Mathias seems to need fluctuating amounts of oxygen depending on whether he is talking or moving.  At baseline seems to need about 2 L but once he gets to talking, coughing, or any level of activity it goes up.  He states that earlier today he had his pulse ox on and its was reading a heart rate in the 130s.  Will have him on cardiac monitoring overnight to see if he is having atrial fibrillation or another arrhythmia.  May need to go home on a loop recorder.  Plan to discharge home tomorrow on home oxygen and a long steroid taper.  He is on board with this plan.    Physical Exam   Vital Signs: Temp: 98.8  F (37.1  C) Temp src: Oral BP: 128/69 Pulse: 99   Resp: 22 SpO2: 90 % O2 Device: Nasal cannula Oxygen Delivery: 5 LPM  Weight: 200 lbs 6.4 oz    General Appearance: Awake, alert, in no acute distress  Respiratory: Diminished in the upper regions of both sides  Cardiovascular: Mild tachycardia  GI: soft, nontender, non distended, normal bowel sounds  Skin: no jaundice, no rash      Medical Decision Making       50 MINUTES SPENT BY ME on the date of service doing chart review, history, exam, documentation & further activities per the note.      Data         Imaging results reviewed over the past 24 hrs:   No results found for this or any previous visit (from the past 24 hour(s)).

## 2024-04-10 NOTE — PROGRESS NOTES
"Care Management Follow Up    Length of Stay (days): 4    Expected Discharge Date: 04/11/2024     Concerns to be Addressed: discharge planning     Patient plan of care discussed at interdisciplinary rounds: Yes    Anticipated Discharge Disposition:       Anticipated Discharge Services:    Education Provided on the Discharge Plan:  Per Care Team   Patient/Family in Agreement with the Plan:  Yes    Referrals Placed by CM/SW:    Private pay costs discussed: Not applicable    Additional Information:  Chart reviewed.     Cm updates:  Patient on 4 liters NC  No therapy consults placed at this time. Pt moving around Ind per flowsheets.       Social hx:  \"From home, lives w/spouse and adult children. Indep with all I/ADLs at baseline. Works full time. Uses no DME. Anticipate home at discharge. Family to transport. \"      Cm will continue to follow plan of care,review recommendations, and assist with any discharge needs anticipated.       Jessika Spears RN      "

## 2024-04-11 ENCOUNTER — DOCUMENTATION ONLY (OUTPATIENT)
Dept: HOME HEALTH SERVICES | Facility: CLINIC | Age: 64
End: 2024-04-11
Payer: COMMERCIAL

## 2024-04-11 VITALS
HEART RATE: 88 BPM | BODY MASS INDEX: 31.45 KG/M2 | HEIGHT: 67 IN | RESPIRATION RATE: 18 BRPM | TEMPERATURE: 98.7 F | OXYGEN SATURATION: 90 % | WEIGHT: 200.4 LBS | DIASTOLIC BLOOD PRESSURE: 60 MMHG | SYSTOLIC BLOOD PRESSURE: 126 MMHG

## 2024-04-11 DIAGNOSIS — J44.9 COPD (CHRONIC OBSTRUCTIVE PULMONARY DISEASE) (H): Primary | ICD-10-CM

## 2024-04-11 PROBLEM — J96.01 ACUTE RESPIRATORY FAILURE WITH HYPOXIA (H): Status: ACTIVE | Noted: 2023-11-19

## 2024-04-11 LAB
GLUCOSE BLDC GLUCOMTR-MCNC: 100 MG/DL (ref 70–99)
GLUCOSE BLDC GLUCOMTR-MCNC: 204 MG/DL (ref 70–99)
GLUCOSE BLDC GLUCOMTR-MCNC: 271 MG/DL (ref 70–99)

## 2024-04-11 PROCEDURE — 250N000009 HC RX 250: Performed by: INTERNAL MEDICINE

## 2024-04-11 PROCEDURE — 250N000012 HC RX MED GY IP 250 OP 636 PS 637: Performed by: INTERNAL MEDICINE

## 2024-04-11 PROCEDURE — 250N000013 HC RX MED GY IP 250 OP 250 PS 637: Performed by: INTERNAL MEDICINE

## 2024-04-11 PROCEDURE — 999N000157 HC STATISTIC RCP TIME EA 10 MIN

## 2024-04-11 PROCEDURE — 99239 HOSP IP/OBS DSCHRG MGMT >30: CPT | Performed by: INTERNAL MEDICINE

## 2024-04-11 PROCEDURE — 258N000003 HC RX IP 258 OP 636: Performed by: INTERNAL MEDICINE

## 2024-04-11 PROCEDURE — 94640 AIRWAY INHALATION TREATMENT: CPT | Mod: 76

## 2024-04-11 RX ORDER — PREDNISONE 10 MG/1
TABLET ORAL
Qty: 45 TABLET | Refills: 0 | Status: SHIPPED | OUTPATIENT
Start: 2024-04-11 | End: 2024-05-04

## 2024-04-11 RX ORDER — ALBUTEROL SULFATE 0.83 MG/ML
2.5 SOLUTION RESPIRATORY (INHALATION) EVERY 6 HOURS PRN
Qty: 90 ML | Refills: 0 | Status: SHIPPED | OUTPATIENT
Start: 2024-04-11 | End: 2024-04-22

## 2024-04-11 RX ADMIN — ESCITALOPRAM OXALATE 20 MG: 20 TABLET ORAL at 09:58

## 2024-04-11 RX ADMIN — NICOTINE 1 PATCH: 21 PATCH, EXTENDED RELEASE TRANSDERMAL at 09:58

## 2024-04-11 RX ADMIN — SODIUM CHLORIDE: 9 INJECTION, SOLUTION INTRAVENOUS at 05:29

## 2024-04-11 RX ADMIN — IPRATROPIUM BROMIDE AND ALBUTEROL SULFATE 3 ML: .5; 3 SOLUTION RESPIRATORY (INHALATION) at 11:32

## 2024-04-11 RX ADMIN — ATORVASTATIN CALCIUM 20 MG: 10 TABLET, FILM COATED ORAL at 09:57

## 2024-04-11 RX ADMIN — IPRATROPIUM BROMIDE AND ALBUTEROL SULFATE 3 ML: .5; 3 SOLUTION RESPIRATORY (INHALATION) at 16:24

## 2024-04-11 RX ADMIN — IPRATROPIUM BROMIDE AND ALBUTEROL SULFATE 3 ML: .5; 3 SOLUTION RESPIRATORY (INHALATION) at 08:11

## 2024-04-11 RX ADMIN — PREDNISONE 40 MG: 20 TABLET ORAL at 09:58

## 2024-04-11 RX ADMIN — UMECLIDINIUM 1 PUFF: 62.5 AEROSOL, POWDER ORAL at 09:59

## 2024-04-11 RX ADMIN — LISINOPRIL 2.5 MG: 2.5 TABLET ORAL at 09:58

## 2024-04-11 ASSESSMENT — ACTIVITIES OF DAILY LIVING (ADL)
ADLS_ACUITY_SCORE: 20

## 2024-04-11 NOTE — INTERIM SUMMARY
Nebulizer Documentation  I attest that I have seen and evaluated Remi Mathias. He has a diagnosis of J44.1 - Chronic obstructive pulmonary disease with (acute) exacerbation and a nebulizer machine is needed to administer medication to improve breathing passages.     I, the undersigned, certify that the above prescribed supplies are medically necessary for this patient and is both reasonable and necessary in reference to accepted standards of medical and necessary in reference to accepted standards of medical practice in the treatment of this patient's condition and is not prescribed as a convenience.

## 2024-04-11 NOTE — PROGRESS NOTES
SPIRITUAL HEALTH SERVICES Note     Saw pt Remi Mathias and administered Yazdanism sacrament of anointing for the healing of the sick.    Fr. Micah Frank

## 2024-04-11 NOTE — DISCHARGE SUMMARY
"[facility]  Hospitalist Discharge Summary      Date of Admission:  4/6/2024  Date of Discharge:  4/11/2024  Discharging Provider: Santa Stone MD  Discharge Service: Hospitalist Service    Discharge Diagnoses   COPD-emphysema with acute exacerbation  Acute on possibly chronic hypoxic respiratory failure  Diabetes mellitus type 2   Steroid induced hyperglycemia  Obesity  Likely sleep apnea    Clinically Significant Risk Factors     # DMII: A1C = 7.0 % (Ref range: 0.0 - 5.6 %) within past 6 months    # Obesity: Estimated body mass index is 31.39 kg/m  as calculated from the following:    Height as of this encounter: 1.702 m (5' 7\").    Weight as of this encounter: 90.9 kg (200 lb 6.4 oz).       Follow-ups Needed After Discharge   Follow-up Appointments     Follow-up and recommended labs and tests       Follow up with primary care provider, Rosie Dorado, within 7 days to   evaluate medication change, for hospital follow- up, and regarding new   diagnosis.  The following labs/tests are recommended: sleep study,   pulmonary function test.            Unresulted Labs Ordered in the Past 30 Days of this Admission       No orders found from 3/7/2024 to 4/7/2024.        These results will be followed up by Santa Stone    Discharge Disposition   Discharged to home  Condition at discharge: Stable    Hospital Course   Remi Mathias is a 64 year old male admitted on 4/6/2024. He ias admitted with COPD exacerbation and acute hypoxic respiratory failure    Acute on chronic COPD exacerbation  Acute on chronic hypoxic respiratory failure  -Has been needing 3 to 4 L at night and 2 L-4L during the day, may need up to 6L with activity  -completed azithromycin, ceftriaxone while inpatient  -COVID flu and RSV negative  -Now on oral steroid taper, 24 days  -Nebulizer and supplies ordered, albuterol nebulized solution  -continue spiriva  -Pulmonology referral for PFTs     Thrombocytopenia- " resolved  -Mild on admission at 146    Diabetes mellitus type 2  Hyperglycemia-steroid-induced  -Most recent A1C 7.0% 3/6/24  -Insulin regimen: 1: 16 carb coverage, Lantus 18 units at bedtime, moderate sliding scale  -restart duraglutide and glipizide at discharge    History of hypertension  -continue lisinopril    Obesity  -BMI 31    Likely Sleep apnea  -Referral for sleep study at discharge    Consultations This Hospital Stay   CARE MANAGEMENT / SOCIAL WORK IP CONSULT    Code Status   Full Code    Time Spent on this Encounter   I, Santa Stone MD, personally saw the patient today and spent greater than 30 minutes discharging this patient.       Santa Stone MD  [address] [address]  Phone: [phone]  Fax: [phone]  ______________________________________________________________________    Physical Exam   Vital Signs: Temp: 98  F (36.7  C) Temp src: Oral BP: 133/78 Pulse: 82   Resp: 20 SpO2: 90 % O2 Device: Nasal cannula Oxygen Delivery: 4 LPM  Weight: 200 lbs 6.4 oz  General Appearance: Awake, alert, in no acute distress  Respiratory: diminished, on 4 L  Cardiovascular: RRR, no edema  GI: soft, nontender, non distended, normal bowel sounds  Skin: no jaundice, no rash         Primary Care Physician   Rosie Dorado    Discharge Orders      Adult Sleep Eval & Management  Referral      Primary Care - Care Coordination Referral      Adult Pulmonary Medicine  Referral      Reason for your hospital stay    COPD exacerbation     Follow-up and recommended labs and tests     Follow up with primary care provider, Rosie Dorado, within 7 days to evaluate medication change, for hospital follow- up, and regarding new diagnosis.  The following labs/tests are recommended: sleep study, pulmonary function test.     Activity    Your activity upon discharge: activity as tolerated     Oxygen Adult/Peds    Oxygen Documentation  I certify that  this patient, Remi Mathias has been under my care (or a nurse practitioner or physican's assistant working with me). This is the face-to-face encounter for oxygen medical necessity.      At the time of this encounter, I have reviewed the qualifying testing and have determined that supplemental oxygen is reasonable and necessary and is expected to improve the patient's condition in a home setting.         Patient has continued oxygen desaturation due to COPD J44.9.    If portability is ordered, is the patient mobile within the home? yes    Was this visit performed as a telehealth visit: No     Nebulizer and Nebulizer Supplies    Nebulizer Documentation  I attest that I have seen and evaluated Remi Mathias. He has a diagnosis of J44.1 - Chronic obstructive pulmonary disease with (acute) exacerbation and a nebulizer machine is needed to administer medication to improve breathing passages.     I, the undersigned, certify that the above prescribed supplies are medically necessary for this patient and is both reasonable and necessary in reference to accepted standards of medical and necessary in reference to accepted standards of medical practice in the treatment of this patient's condition and is not prescribed as a convenience.     Diet    Follow this diet upon discharge: Moderate Consistent Carb (60 g CHO per Meal) Diet       Significant Results and Procedures   Most Recent 3 CBC's:  Recent Labs   Lab Test 04/09/24  0715 04/08/24  0743 04/07/24  0909   WBC 9.1 11.2* 5.0   HGB 14.8 15.4 16.5   MCV 98 97 95   * 149* 153     Most Recent 3 BMP's:  Recent Labs   Lab Test 04/11/24  0904 04/11/24  0216 04/10/24  2054 04/09/24  0817 04/09/24  0715 04/08/24  0800 04/08/24  0743 04/06/24  2232 04/06/24  1910   NA  --   --   --   --  141  --  139  --  139   POTASSIUM  --   --   --   --  4.0  --  4.0  --  3.8   CHLORIDE  --   --   --   --  104  --  103  --  103   CO2  --   --   --   --  28  --  27  --  24    BUN  --   --   --   --  20.4  --  22.4  --  9.8   CR  --   --   --   --  0.81  --  0.71  --  0.70   ANIONGAP  --   --   --   --  9  --  9  --  12   NATHALIE  --   --   --   --  8.7*  --  9.1  --  8.9   * 271* 220*   < > 113*   < > 134*   < > 176*    < > = values in this interval not displayed.     7-Day Micro Results       Collected Updated Procedure Result Status      04/06/2024 1937 04/06/2024 2023 Symptomatic Influenza A/B, RSV, & SARS-CoV2 PCR (COVID-19) Nasopharyngeal [[id number]]    Swab from Nasopharyngeal    Final result Component Value   Influenza A PCR Negative   Influenza B PCR Negative   RSV PCR Negative   SARS CoV2 PCR Negative   NEGATIVE: SARS-CoV-2 (COVID-19) RNA not detected, presumed negative.                ,   Results for orders placed or performed during the hospital encounter of 04/06/24   XR Chest Port 1 View    Narrative    EXAM: XR CHEST PORT 1 VIEW  LOCATION: [facility]  DATE: 4/6/2024    INDICATION: Dyspnea  COMPARISON: 11/24/2023      Impression    IMPRESSION: Borderline hyperinflation of both lungs. Heart size and pulmonary vascularity within normal limits. No lung infiltrates or pneumothorax. Since 11/24/2023 the slight amount of right pleural fluid/thickening and the platelike atelectasis in the   right lung base have resolved.       Discharge Medications   Current Discharge Medication List        START taking these medications    Details   albuterol (PROVENTIL) (2.5 MG/3ML) 0.083% neb solution Take 1 vial (2.5 mg) by nebulization every 6 hours as needed for shortness of breath, wheezing or cough  Qty: 90 mL, Refills: 0    Associated Diagnoses: COPD with acute exacerbation (H)      predniSONE (DELTASONE) 10 MG tablet Take 4 tablets (40 mg) by mouth daily for 3 days, THEN 3 tablets (30 mg) daily for 5 days, THEN 2 tablets (20 mg) daily for 5 days, THEN 1 tablet (10 mg) daily for 5 days, THEN 0.5 tablets (5 mg) daily for 5 days.  Qty: 45 tablet, Refills: 0     Associated Diagnoses: COPD with acute exacerbation (H)           CONTINUE these medications which have NOT CHANGED    Details   albuterol (PROAIR HFA/PROVENTIL HFA/VENTOLIN HFA) 108 (90 Base) MCG/ACT inhaler INHALE 1 TO 2 PUFFS BY MOUTH EVERY 4 HOURS AS NEEDED FOR SHORTNESS OF BREATH, WHEEZING OR COUGH  Qty: 9 g, Refills: 0    Comments: Pharmacy may dispense brand covered by insurance (Proair, or proventil or ventolin or generic albuterol inhaler)  Associated Diagnoses: Pulmonary emphysema, unspecified emphysema type (H)      atorvastatin (LIPITOR) 20 MG tablet Take 1 tablet (20 mg) by mouth daily  Qty: 90 tablet, Refills: 3    Associated Diagnoses: Type 2 diabetes mellitus with microalbuminuria, without long-term current use of insulin (H); Hyperlipidemia, unspecified hyperlipidemia type      escitalopram (LEXAPRO) 20 MG tablet Take 1 tablet (20 mg) by mouth daily  Qty: 90 tablet, Refills: 2    Associated Diagnoses: Anxiety state      glipiZIDE (GLUCOTROL XL) 10 MG 24 hr tablet Take 10 mg by mouth 2 times daily      lisinopril (ZESTRIL) 2.5 MG tablet Take 1 tablet by mouth once daily  Qty: 90 tablet, Refills: 3    Associated Diagnoses: Type 2 diabetes mellitus with microalbuminuria, without long-term current use of insulin (H)      LORazepam (ATIVAN) 0.5 MG tablet TAKE 1 TABLET(0.5 MG) BY MOUTH DAILY AS NEEDED FOR ANXIETY  Qty: 15 tablet, Refills: 0    Associated Diagnoses: Anxiety state      nicotine (NICODERM CQ) 21 MG/24HR 24 hr patch Place 1 patch onto the skin every 24 hours  Qty: 28 patch, Refills: 1    Associated Diagnoses: Tobacco use disorder      tiotropium (SPIRIVA) 18 MCG inhaled capsule Inhale 1 capsule (18 mcg) into the lungs daily  Qty: 90 capsule, Refills: 1    Associated Diagnoses: Tobacco use disorder; Pulmonary emphysema, unspecified emphysema type (H)      dulaglutide (TRULICITY) 0.75 MG/0.5ML pen Inject 0.75 mg Subcutaneous every 7 days  Qty: 4 mL, Refills: 0    Associated Diagnoses: Type 2  diabetes mellitus with microalbuminuria, without long-term current use of insulin (H)      Glucose Blood (BLOOD GLUCOSE TEST STRIPS) STRP Test blood sugar once daily  Qty: 100 strip, Refills: 11    Associated Diagnoses: Type 2 diabetes mellitus with microalbuminuria, without long-term current use of insulin (H)      nicotine (NICODERM CQ) 14 MG/24HR 24 hr patch Place 1 patch onto the skin every 24 hours  Qty: 28 patch, Refills: 0    Associated Diagnoses: Tobacco use disorder      nicotine (NICODERM CQ) 7 MG/24HR 24 hr patch Place 1 patch onto the skin every 24 hours  Qty: 28 patch, Refills: 0    Associated Diagnoses: Tobacco use disorder      nicotine (NICORETTE) 2 MG gum Place 1 each (2 mg) inside cheek every hour as needed for nicotine withdrawal symptoms  Qty: 100 each, Refills: 1    Associated Diagnoses: Tobacco use disorder           STOP taking these medications       dulaglutide (TRULICITY) 1.5 MG/0.5ML pen Comments:   Reason for Stopping:             Allergies   Allergies   Allergen Reactions    Metformin Diarrhea     XR and IR both cause diarrhea

## 2024-04-11 NOTE — PROGRESS NOTES
Received intake call for home oxygen at 5:30PM. Reviewed patient's chart; Patient qualifies under insurance guidelines and all documentation is in the chart including a good order.     5:37PM- Spoke with care coordinator, HUY, confirmed we received the order for home oxygen and nebulizer, and provided them with ETA of oxygen.  Due to after hours, it could be up to 4 hours.      5:45PM- Called to offer choice and patient is okay with [facility] setting them up. Discussed equipment with patient and informed them that we would be to bedside with oxygen in the next 4 hours.     NOTE:  DISPENSING TRANSPORT TANKS, CONCENTRATOR, AND NEBULIZER FOR DISCHARGE.  WE WILL DELIVER PORTABILITY TOMORROW.

## 2024-04-11 NOTE — INTERIM SUMMARY
Oxygen Documentation  I certify that this patient, Remi Mathias has been under my care (or a nurse practitioner or physican's assistant working with me). This is the face-to-face encounter for oxygen medical necessity.      At the time of this encounter, I have reviewed the qualifying testing and have determined that supplemental oxygen is reasonable and necessary and is expected to improve the patient's condition in a home setting.         Patient has continued oxygen desaturation due to COPD J44.9.    If portability is ordered, is the patient mobile within the home? yes    Was this visit performed as a telehealth visit: No

## 2024-04-11 NOTE — SIGNIFICANT EVENT
Patient has been assessed for Home Oxygen needs.     Pulse oximetry (SpO2) and Oxygen flow readings:    SpO2 = 85% on room air at rest while awake.    SpO2 improved to 93% on 4 liters/minute at rest.    SpO2 = 83% on room air during activity/with exercise.    *SpO2 improved to 89% on 5 liters/minute during activity/with exercise.

## 2024-04-11 NOTE — PROGRESS NOTES
Care Management Discharge Note    Discharge Date: 04/11/2024       Discharge Disposition: Home    Discharge Services:  Home with home O2 and nebs     Discharge DME:  Neb    Discharge Transportation: family or friend will provide      Education Provided on the Discharge Plan:  Per Care Team   Persons Notified of Discharge Plans: Yes  Patient/Family in Agreement with the Plan:  Yes    Handoff Referral Completed: Yes    Additional Information:    Final discharge plan is for pt to return home with home oxygen and OP follow up. Hospitalist is filling out pt's disability paperwork and will provide pt with this at discharge. Family to transport.       Jessika Spears RN

## 2024-04-11 NOTE — Clinical Note
Future Appointments 4/12/2024  12:30 PM   [facility] PFT RM 2              [facility]              Beam 4/16/2024  2:00 PM    Diane Strauss, DEBI CNP    [facility] [place]              Beam 9/11/2024  10:00 AM   Rosie Dorado, NP            [facility]              [facility]

## 2024-04-11 NOTE — PROGRESS NOTES
Pushing appt out.  Just discharged from hospital today and on long prednisone taper.    
Pt is A&Ox4. walks, refuses PAS, states they are uncomfortable

## 2024-04-11 NOTE — PLAN OF CARE
Problem: Gas Exchange Impaired  Goal: Optimal Gas Exchange  4/10/2024 1943 by Mica Torres RN  Outcome: Progressing  4/10/2024 1440 by Mica Torres RN  Outcome: Progressing  Intervention: Optimize Oxygenation and Ventilation  Recent Flowsheet Documentation  Taken 4/10/2024 1615 by Mica Torres RN  Head of Bed (HOB) Positioning: HOB at 60-90 degrees  Taken 4/10/2024 0845 by Mica Torres RN  Head of Bed (HOB) Positioning: HOB at 60-90 degrees   Goal Outcome Evaluation:         Pt continues to deny any pain. Pt's Oxygen was titrated up to 5 L this afternoon when his sat was staying at 87% on 4 L. Pt back on Tele. Call light within reach and PT calls appropriately.                Benzoyl Peroxide Counseling: Patient counseled that medicine may cause skin irritation and bleach clothing.  In the event of skin irritation, the patient was advised to reduce the amount of the drug applied or use it less frequently.   The patient verbalized understanding of the proper use and possible adverse effects of benzoyl peroxide.  All of the patient's questions and concerns were addressed.

## 2024-04-11 NOTE — PLAN OF CARE
Problem: Gas Exchange Impaired  Goal: Optimal Gas Exchange  Outcome: Progressing     Problem: Comorbidity Management  Goal: Blood Glucose Levels Within Targeted Range  Outcome: Not Progressing   Goal Outcome Evaluation:  Mahendra A&Ox4. 5L NC. NSR on tele, no tachycardia  noted. SBA/independent in room. Denies pain.    Did not wean O2 overnight. Sats between 90-91%, up to 93% when sleeping. Cough is much improved. He appeared to sleep well.

## 2024-04-15 ENCOUNTER — MYC MEDICAL ADVICE (OUTPATIENT)
Dept: INTERNAL MEDICINE | Facility: CLINIC | Age: 64
End: 2024-04-15

## 2024-04-15 ENCOUNTER — OFFICE VISIT (OUTPATIENT)
Dept: INTERNAL MEDICINE | Facility: CLINIC | Age: 64
End: 2024-04-15
Payer: COMMERCIAL

## 2024-04-15 ENCOUNTER — PATIENT OUTREACH (OUTPATIENT)
Dept: CARE COORDINATION | Facility: CLINIC | Age: 64
End: 2024-04-15

## 2024-04-15 VITALS
WEIGHT: 205 LBS | HEART RATE: 82 BPM | OXYGEN SATURATION: 93 % | HEIGHT: 67 IN | SYSTOLIC BLOOD PRESSURE: 106 MMHG | BODY MASS INDEX: 32.18 KG/M2 | RESPIRATION RATE: 18 BRPM | DIASTOLIC BLOOD PRESSURE: 62 MMHG

## 2024-04-15 DIAGNOSIS — Z09 HOSPITAL DISCHARGE FOLLOW-UP: Primary | ICD-10-CM

## 2024-04-15 DIAGNOSIS — F41.1 ANXIETY STATE: ICD-10-CM

## 2024-04-15 DIAGNOSIS — J96.01 ACUTE RESPIRATORY FAILURE WITH HYPOXIA (H): ICD-10-CM

## 2024-04-15 DIAGNOSIS — J44.1 COPD WITH ACUTE EXACERBATION (H): ICD-10-CM

## 2024-04-15 PROCEDURE — 99495 TRANSJ CARE MGMT MOD F2F 14D: CPT | Performed by: NURSE PRACTITIONER

## 2024-04-15 NOTE — PROGRESS NOTES
"  Assessment & Plan   Problem List Items Addressed This Visit       Anxiety, insomnia     Relevant Medications    LORazepam (ATIVAN) 0.5 MG tablet    Acute respiratory failure with hypoxia (H)    COPD with acute exacerbation (H)     Other Visit Diagnoses       Hospital discharge follow-up    -  Primary           - Reviewed inpatient records   - He still requires oxygen.  As such, he is unable to return to work as he does not have an oxygen tank that would last throughout the entire day at work.  Letter written for him to remain out of work for an additional 1 week (through 4/22). He will be reevaluated for workability at his next office visit  - Complete prednisone taper as prescribed  - Continue with inhalers  -He is strongly encouraged to keep appointment for pulmonary function testing and follow-up with pulmonology.  He has been deferring PFTs for some time now.    Return in about 1 week (around 4/22/2024) for follow up .      BMI  Estimated body mass index is 32.11 kg/m  as calculated from the following:    Height as of this encounter: 1.702 m (5' 7\").    Weight as of this encounter: 93 kg (205 lb).         Kaleb Mccray is a 64 year old, presenting for the following health issues:  Hospital F/U        4/15/2024     2:55 PM   Additional Questions   Roomed by Gee Matias   Accompanied by N/A     HPI     Hospital Follow-up Visit:  Hospital/Nursing Home/IP Rehab Facility: [facility]  Date of Admission: 4/6/24  Date of Discharge: 4/11/24  Reason(s) for Admission: COPD with acute exacerbation   Was the patient in the ICU or did the patient experience delirium during hospitalization?  No  Do you have any other stressors you would like to discuss with your provider? No  Problems taking medications regularly:  None  Medication changes since discharge: starting taking predniSONE  and albuterol   Problems adhering to non-medication therapy:  None    He reports that his breathing has been good. " "He is still taking the prednisone taper, he notes some difficulty sleeping. He is using oxygen, he usually keeps the flow rate at 4LPM. SpO2 readings have been 92-93%. His last cigarette was on 4/6. He has been using a nicotine patch which has helped with cravings.     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 He has not been back to work since his hospitalization.         Objective    /62 (BP Location: Right arm, Patient Position: Sitting, Cuff Size: Adult Regular)   Pulse 82   Resp 18   Ht 1.702 m (5' 7\")   Wt 93 kg (205 lb)   SpO2 93%   BMI 32.11 kg/m    Body mass index is 32.11 kg/m .  Physical Exam   GENERAL: alert and no distress  RESP: lungs clear to auscultation - no rales, rhonchi or wheezes  CV: regular rate and rhythm, normal S1 S2, no S3 or S4, no murmur  ABDOMEN: soft, nontender, no hepatosplenomegaly, no masses and bowel sounds normal      6-minute walk SpO2  Minute 0 at rest: 93%  Minute 1: 92%  Minute 2: 87%. Added 1 LPM oxygen  Minute 3: 91% (1LPM)  Minute 4: 93% (1LPM)  Minute 5: 92% (1LPM)  Minute 6: 92% (1LPM)         Signed Electronically by: Rosie Dorado NP    "

## 2024-04-15 NOTE — PROGRESS NOTES
Clinic Care Coordination Contact  [facility]/Voicemail    Clinical Data: Care Coordinator Outreach    Outreach Documentation Number of Outreach Attempt   4/15/2024  10:00 AM 1       Left message on patient's voicemail with call back information and requested return call.    Plan: Care Coordinator will try to reach patient again in 1-2 business days.    David Myhre, RN   CCC RN

## 2024-04-15 NOTE — LETTER
April 15, 2024      Remi Mathias  [address] [place] [id number]        To Whom It May Concern:    Remi Mathias  was seen on 4/15/24. Due to his medical condition, he will not be able to return to work until 4/23/24 at the earliest. He will have an appointment on 4/22 to determine work ability.         Sincerely,          Rosie Dorado, DNP, APRN, CNP   04/15/24       Grandparent  Still living? No  Family history of pancreatic cancer, Age at diagnosis: Age Unknown

## 2024-04-16 ENCOUNTER — PATIENT OUTREACH (OUTPATIENT)
Dept: CARE COORDINATION | Facility: CLINIC | Age: 64
End: 2024-04-16

## 2024-04-16 DIAGNOSIS — R80.9 TYPE 2 DIABETES MELLITUS WITH MICROALBUMINURIA, WITHOUT LONG-TERM CURRENT USE OF INSULIN (H): ICD-10-CM

## 2024-04-16 DIAGNOSIS — E11.29 TYPE 2 DIABETES MELLITUS WITH MICROALBUMINURIA, WITHOUT LONG-TERM CURRENT USE OF INSULIN (H): ICD-10-CM

## 2024-04-16 RX ORDER — LORAZEPAM 0.5 MG/1
0.5 TABLET ORAL DAILY PRN
Qty: 15 TABLET | Refills: 0 | Status: SHIPPED | OUTPATIENT
Start: 2024-04-16 | End: 2024-05-28

## 2024-04-16 NOTE — PROGRESS NOTES
Clinic Care Coordination Contact  [facility]/Voicemail    Clinical Data: Care Coordinator Outreach    Outreach Documentation Number of Outreach Attempt   4/15/2024  10:00 AM 1   4/16/2024  11:33 AM 2       Left message on patient's voicemail with call back information and requested return call.    Plan: Care Coordinator will send care coordination introduction letter with care coordinator contact information and explanation of care coordination services via [employer]. Care Coordinator will do no further outreaches at this time.    David Myhre, RN  [facility] RN  [phone]

## 2024-04-16 NOTE — LETTER
[facility] CARE COORDINATION  [facility]    April 16, 2024    Remi Mathias  [address] [place] [id number]      Dear Remi,    I am a clinic care coordinator who works with Rosie Dorado NP with the [facility]. I have been trying to reach you recently to introduce Clinic Care Coordination. Below is a description of clinic care coordination and how I can further assist you.       The clinic care coordination team is made up of a registered nurse, , financial resource worker and community health worker who understand the health care system. The goal of clinic care coordination is to help you manage your health and improve access to the health care system. Our team works alongside your provider to assist you in determining your health and social needs. We can help you obtain health care and community resources, providing you with necessary information and education. We can work with you through any barriers and develop a care plan that helps coordinate and strengthen the communication between you and your care team.  Our services are voluntary and are offered without charge to you personally.    Please feel free to contact me with any questions or concerns regarding care coordination and what we can offer.      We are focused on providing you with the highest-quality healthcare experience possible.    Sincerely,     David Myhre, RN   [facility] RN  [phone]

## 2024-04-22 ENCOUNTER — OFFICE VISIT (OUTPATIENT)
Dept: INTERNAL MEDICINE | Facility: CLINIC | Age: 64
End: 2024-04-22
Payer: COMMERCIAL

## 2024-04-22 VITALS
RESPIRATION RATE: 18 BRPM | TEMPERATURE: 98.5 F | BODY MASS INDEX: 32.18 KG/M2 | HEIGHT: 67 IN | OXYGEN SATURATION: 91 % | DIASTOLIC BLOOD PRESSURE: 76 MMHG | WEIGHT: 205 LBS | SYSTOLIC BLOOD PRESSURE: 118 MMHG | HEART RATE: 83 BPM

## 2024-04-22 DIAGNOSIS — J44.1 COPD WITH ACUTE EXACERBATION (H): Primary | ICD-10-CM

## 2024-04-22 DIAGNOSIS — J43.9 PULMONARY EMPHYSEMA, UNSPECIFIED EMPHYSEMA TYPE (H): ICD-10-CM

## 2024-04-22 DIAGNOSIS — J96.01 ACUTE RESPIRATORY FAILURE WITH HYPOXIA (H): ICD-10-CM

## 2024-04-22 DIAGNOSIS — F17.200 TOBACCO USE DISORDER: ICD-10-CM

## 2024-04-22 PROCEDURE — 99214 OFFICE O/P EST MOD 30 MIN: CPT | Performed by: NURSE PRACTITIONER

## 2024-04-22 RX ORDER — ALBUTEROL SULFATE 90 UG/1
1-2 AEROSOL, METERED RESPIRATORY (INHALATION) EVERY 4 HOURS PRN
Qty: 18 G | Refills: 0 | Status: SHIPPED | OUTPATIENT
Start: 2024-04-22 | End: 2024-08-16

## 2024-04-22 RX ORDER — RESPIRATORY SYNCYTIAL VIRUS VACCINE 120MCG/0.5
0.5 KIT INTRAMUSCULAR ONCE
Qty: 1 EACH | Refills: 0 | Status: CANCELLED | OUTPATIENT
Start: 2024-04-22 | End: 2024-04-22

## 2024-04-22 RX ORDER — ALBUTEROL SULFATE 0.83 MG/ML
2.5 SOLUTION RESPIRATORY (INHALATION) EVERY 6 HOURS PRN
Qty: 90 ML | Refills: 1 | Status: SHIPPED | OUTPATIENT
Start: 2024-04-22 | End: 2024-07-16

## 2024-04-22 ASSESSMENT — PAIN SCALES - GENERAL: PAINLEVEL: NO PAIN (0)

## 2024-04-22 NOTE — ASSESSMENT & PLAN NOTE
- Complete PFTs as scheduled  - Consider the addition of an ICS/LABA  - Continue tiotropium and albuterol PRN

## 2024-04-22 NOTE — ASSESSMENT & PLAN NOTE
He still requires oxygen with activity, SpO2 drops to 83% on room air without oxygen  - Continue 1-3 LPM with activity to maintain sats above 88%  - He may return to work this Friday but will require oxygen while at work

## 2024-04-22 NOTE — PROGRESS NOTES
"  Assessment & Plan   Problem List Items Addressed This Visit       Nicotine Dependence     Last cigarette on 4/6. He is commended for tobacco cessation  - Continue nicotine patch, taper as able          Acute respiratory failure with hypoxia (H)     He still requires oxygen with activity, SpO2 drops to 83% on room air without oxygen  - Continue 1-3 LPM with activity to maintain sats above 88%  - He may return to work this Friday but will require oxygen while at work           Pulmonary emphysema, unspecified emphysema type (H), suspected based on clinical sx     - Complete PFTs as scheduled  - Consider the addition of an ICS/LABA  - Continue tiotropium and albuterol PRN         Relevant Medications    albuterol (PROAIR HFA/PROVENTIL HFA/VENTOLIN HFA) 108 (90 Base) MCG/ACT inhaler    albuterol (PROVENTIL) (2.5 MG/3ML) 0.083% neb solution    COPD with acute exacerbation (H) - Primary    Relevant Medications    albuterol (PROAIR HFA/PROVENTIL HFA/VENTOLIN HFA) 108 (90 Base) MCG/ACT inhaler    albuterol (PROVENTIL) (2.5 MG/3ML) 0.083% neb solution        BMI  Estimated body mass index is 32.11 kg/m  as calculated from the following:    Height as of this encounter: 1.702 m (5' 7\").    Weight as of this encounter: 93 kg (205 lb).       Kaleb Mccray is a 64 year old, presenting for the following health issues:  Follow Up (COPD)        4/22/2024    11:48 AM   Additional Questions   Roomed by ANNA Aaron   Accompanied by Self     History of Present Illness       COPD:  He presents for follow up of COPD.   Overall, COPD symptoms are better since last visit. He has less than usual fatigue or shortness of breath with exertion and less than usual shortness of breath at rest.  He rarely coughs and does have change in sputum. No recent fever. He can walk 2-5 blocks without stopping to rest. He can walk 2 flights of stairs without resting. The patient has had no ED, urgent care, or hospital admissions because of COPD since " "the last visit.     He eats 0-1 servings of fruits and vegetables daily.He consumes 3 sweetened beverage(s) daily.He exercises with enough effort to increase his heart rate 9 or less minutes per day.  He exercises with enough effort to increase his heart rate 3 or less days per week.   He is taking medications regularly.     He was hospitalized for a COPD exacerbation and started on prednisone and oxygen. He has been using oxygen at 1-3LPM. When he is active his oxygen level increases by his report. He is not smoking. He has occasional cravings for nicotine, the patches are helpful.     His usual work schedule is Monday, Tuesday, Friday, Saturday, Sunday.           Objective    /76 (BP Location: Right arm, Patient Position: Sitting, Cuff Size: Adult Large)   Pulse 83   Temp 98.5  F (36.9  C) (Oral)   Resp 18   Ht 1.702 m (5' 7\")   Wt 93 kg (205 lb)   SpO2 91%   BMI 32.11 kg/m    Body mass index is 32.11 kg/m .  Physical Exam   GENERAL: alert and no distress  RESP:  breath sounds generally diminished but lungs clear to auscultation - no rales, rhonchi or wheezes  CV: regular rate and rhythm, normal S1 S2, no S3 or S4, no murmur    Oxygen levels:   Rest RA: 90%  1 minute walk, RA: 83%          Signed Electronically by: Rosie Dorado NP    "

## 2024-04-22 NOTE — ASSESSMENT & PLAN NOTE
Last cigarette on 4/6. He is commended for tobacco cessation  - Continue nicotine patch, taper as able

## 2024-04-22 NOTE — LETTER
April 22, 2024      Remi Mathias  [address] [address]        To Whom It May Concern:    Remi Mathias was seen in our clinic. He is advised that he may return to work on 4/26/24  but will need to wear oxygen when he is working.       Sincerely,        Rosie Dorado, DNP, APRN, CNP

## 2024-04-29 LAB
ATRIAL RATE - MUSE: 81 BPM
DIASTOLIC BLOOD PRESSURE - MUSE: 73 MMHG
INTERPRETATION ECG - MUSE: NORMAL
P AXIS - MUSE: 74 DEGREES
PR INTERVAL - MUSE: 138 MS
QRS DURATION - MUSE: 128 MS
QT - MUSE: 414 MS
QTC - MUSE: 480 MS
R AXIS - MUSE: 269 DEGREES
SYSTOLIC BLOOD PRESSURE - MUSE: 120 MMHG
T AXIS - MUSE: 60 DEGREES
VENTRICULAR RATE- MUSE: 81 BPM

## 2024-05-15 ENCOUNTER — MYC MEDICAL ADVICE (OUTPATIENT)
Dept: INTERNAL MEDICINE | Facility: CLINIC | Age: 64
End: 2024-05-15
Payer: COMMERCIAL

## 2024-05-15 DIAGNOSIS — E11.65 TYPE 2 DIABETES MELLITUS WITH HYPERGLYCEMIA, WITHOUT LONG-TERM CURRENT USE OF INSULIN (H): Primary | ICD-10-CM

## 2024-05-15 NOTE — TELEPHONE ENCOUNTER
Patient requesting trulicity 1.5 in stock    Routing to PCP if PCP wants to put an order in for that one. He reports that he used his last pen on Saturday

## 2024-05-23 ENCOUNTER — OFFICE VISIT (OUTPATIENT)
Dept: PULMONOLOGY | Facility: CLINIC | Age: 64
End: 2024-05-23
Payer: COMMERCIAL

## 2024-05-23 VITALS
DIASTOLIC BLOOD PRESSURE: 78 MMHG | HEIGHT: 67 IN | BODY MASS INDEX: 32.18 KG/M2 | WEIGHT: 205 LBS | SYSTOLIC BLOOD PRESSURE: 115 MMHG | OXYGEN SATURATION: 92 % | HEART RATE: 88 BPM

## 2024-05-23 DIAGNOSIS — J43.9 PULMONARY EMPHYSEMA, UNSPECIFIED EMPHYSEMA TYPE (H): ICD-10-CM

## 2024-05-23 DIAGNOSIS — J30.2 SEASONAL ALLERGIC RHINITIS, UNSPECIFIED TRIGGER: ICD-10-CM

## 2024-05-23 DIAGNOSIS — J44.9 COPD (CHRONIC OBSTRUCTIVE PULMONARY DISEASE) (H): ICD-10-CM

## 2024-05-23 DIAGNOSIS — Z72.0 TOBACCO ABUSE: ICD-10-CM

## 2024-05-23 DIAGNOSIS — J44.9 CHRONIC OBSTRUCTIVE PULMONARY DISEASE, UNSPECIFIED COPD TYPE (H): Primary | ICD-10-CM

## 2024-05-23 DIAGNOSIS — J96.21 ACUTE ON CHRONIC RESPIRATORY FAILURE WITH HYPOXIA (H): ICD-10-CM

## 2024-05-23 LAB — HGB BLD-MCNC: 16.2 G/DL

## 2024-05-23 PROCEDURE — 85018 HEMOGLOBIN: CPT | Mod: QW | Performed by: INTERNAL MEDICINE

## 2024-05-23 PROCEDURE — 94726 PLETHYSMOGRAPHY LUNG VOLUMES: CPT | Mod: 26 | Performed by: INTERNAL MEDICINE

## 2024-05-23 PROCEDURE — 94729 DIFFUSING CAPACITY: CPT | Mod: 26 | Performed by: INTERNAL MEDICINE

## 2024-05-23 PROCEDURE — 99204 OFFICE O/P NEW MOD 45 MIN: CPT | Performed by: NURSE PRACTITIONER

## 2024-05-23 PROCEDURE — 94375 RESPIRATORY FLOW VOLUME LOOP: CPT | Mod: 26 | Performed by: INTERNAL MEDICINE

## 2024-05-23 RX ORDER — UMECLIDINIUM BROMIDE AND VILANTEROL TRIFENATATE 62.5; 25 UG/1; UG/1
1 POWDER RESPIRATORY (INHALATION) DAILY
Qty: 60 EACH | Refills: 11 | Status: SHIPPED | OUTPATIENT
Start: 2024-05-23

## 2024-05-23 NOTE — PROGRESS NOTES
Pulmonary Outpatient Consult Note  May 23, 2024      Assessment and Plan:   Remi Mathias is a 64 year old male, current smoker, with history of COPD, DM2, HTN, and obesity presenting today for evaluation of COPD.  Recent hospital stay x 2 for exacerbation. No prior PFTs and was not on long acting inhalers. Since start Spiriva he feels his breathing is a bit better, has not been needed albuterol much. He is able to work his part time job at [employer] and has had normal SpO2 since discharge. Is still wearing O2 at night with no nocturnal assessment.     PFTs show mild obstruction. He had used Spiriva prior to testing. Lung volumes were normal. Diffusing capacity when corrected for hemoglobin was moderately/severely reduced.   Eos on 4/6 elevated at 1.1. No previous results for comparison.   Previous CT imaging demonstrates mild upper lung predominant paraseptal emphysema.     #. COPD, GOLD E (low symptom burden, hospital stay x 2): COPD diagnosis confirmed with PFTs. As he used his inhaler and smoked prior to testing results may be skewed. He feels back to baseline post discharge. As he still has some symptom burden we will increase him to LABA/LAMA. Eos elevated inpatient, unsure of if he was on prednisone at that time. We will recheck in the next few weeks. If elevated will refer to allergy.   STOP Spiriva and START Anoro, 1 puff daily.   Continue albuterol prn  Declines pulmonary rehab at this time. He will think about it.  IgE, [place] allergy panel, and CBC with diff.   #. ? Nocturnal hypoxia or ARSEN: Concern for ARSEN during hospital stay. He has been wearing O2 at night but has not been evaluated by sleep medicine.   FABRICIO to assess for nocturnal hypoxia.   Recommend sleep medicine consult as ordered at discharge.   #. Diffusion defect: Given minimal emphysema there is likely another contributing factor like smoking prior to testing or cardiac cause. Recent echo shows normal EF. SOB could also be from  deconditioning, although he gets regular activity at his job.   #. Tobacco abuse: We discussed cessation and the likelihood his lung function will continue to worsen if he is smoking. He is trying to cut down and is using patches. Declines Chantix at this time.   Continue NRT  Qualifies for LDCT for lung cancer screening. Due in 04/2025.     If his symptoms exacerbate: prednisone 40mg daily x 5 days. If sputum amount or thickness increased add azithromycin zpak x 5 days.     RTC 3 months, sooner if needed    Diane Strauss, CYNTHIA  Pulmonary Medicine  ______________________________________________________________________________    CC:   Chief Complaint   Patient presents with    Follow Up     Hospital follow up     HPI:   Mahendra presents today for evaluation of COPD.     Hospitalized in 11/2023 and again from 4/6-4/11/2024 for COPD exacerbation and acute respiratory failure. He thinks he had initial URI symptoms prior to both stays. COVID and flu negative. He was treated with a prolonged steroid taper x 24 days, azithromycin, and ceftriaxone.     Currently feels back to his baseline.   Has some cough that is occasionally productive of some sputum.   No wheeze or chest tightness.   Is using supplemental oxygen at night because he has it. Does not feel more short of breath at night.   Has been using Spiriva daily for the past 3 months. He admits to some improvement with use.   Has not needed as much albuterol lately. Was going through one albuterol inhaler every few weeks previously.   Is cutting down cigarettes, down to 1/3 ppd.     Denies breathing issues as a child or young adult.   Will occasionally use Flonase for nasal congestion. He is not sure if he has seasonal allergies.     FH- nephew has asthma     Patient supplied answers from flow sheet for:  COPD Assessment Test (CAT)  2009 [employer]. All rights reserved.      5/23/2024     8:18 AM   COPD assessment test (CAT)   Cough 1   Phlegm 0   Chest tightness 0   Walk up hill  3   Limited activities 0   Leaving my home 0   Sleep 0   Energy 3   Total Score 7      PMH:  Patient Active Problem List    Diagnosis Date Noted    COPD with acute exacerbation (H) 04/06/2024     Priority: Medium    Pulmonary emphysema, unspecified emphysema type (H), suspected based on clinical sx 03/06/2024     Priority: Medium     2/2024- No PFTs, pt cannot afford to do additional testing. Radiographic finding of emphysema.       Acute respiratory failure with hypoxia (H) 11/19/2023     Priority: Medium    Nicotine Dependence      Priority: Medium     5/12/21- 3/4-1 pack per day. Trial nicotine patches     11/19/23- cessation with nicotine patch     Quit smoking with hospitalization 4/2024        Type 2 diabetes mellitus (H)      Priority: Medium    Essential hypertension      Priority: Medium    Hyperlipidemia      Priority: Medium     Created by Conversion        Controlled substance agreement signed, lorazepam 5/2021 09/24/2018     Priority: Medium    Anxiety, insomnia       Priority: Medium     Created by Conversion  Replacement Utility updated for latest IMO load        Allergic Rhinitis      Priority: Medium     Created by Conversion  Replacement Utility updated for latest IMO load        Dislocation Of The Right Shoulder      Priority: Medium     Created by Conversion  [facility] Annotation: Oct 12 2010  7:38PM - Osmany Moss: Rt.   Shoulder   reduced ([facility] ED) successfully.  Plans for Ortho consult given   shoulder   laxity.  Replacement Utility updated for latest IMO load        Onychomycosis Of The Toenails      Priority: Medium     Created by Conversion           PSH:  Past Surgical History:   Procedure Laterality Date    INCISION AND DRAINAGE OF WOUND Right 2/26/2020    Procedure: Exploration and Arthrotomy of metacarpophalangeal  joint;  Surgeon: Jany Cote MD;  Location: [facility] OR;  Service: Orthopedics    [facility] LAP,CHOLECYSTECTOMY/EXPLORE      Description: Cholecystectomy  Laparoscopic;  Recorded: 12/08/2009;     Current Meds:  Current Outpatient Medications   Medication Sig Dispense Refill    albuterol (PROAIR HFA/PROVENTIL HFA/VENTOLIN HFA) 108 (90 Base) MCG/ACT inhaler Inhale 1-2 puffs into the lungs every 4 hours as needed for shortness of breath, wheezing or cough 18 g 0    albuterol (PROVENTIL) (2.5 MG/3ML) 0.083% neb solution Take 1 vial (2.5 mg) by nebulization every 6 hours as needed for shortness of breath, wheezing or cough 90 mL 1    atorvastatin (LIPITOR) 20 MG tablet Take 1 tablet (20 mg) by mouth daily 90 tablet 3    dulaglutide (TRULICITY) 0.75 MG/0.5ML pen Inject 0.75 mg Subcutaneous every 7 days 4 mL 0    escitalopram (LEXAPRO) 20 MG tablet Take 1 tablet (20 mg) by mouth daily 90 tablet 2    glipiZIDE (GLUCOTROL XL) 10 MG 24 hr tablet Take 10 mg by mouth 2 times daily      Glucose Blood (BLOOD GLUCOSE TEST STRIPS) STRP Test blood sugar once daily 100 strip 11    lisinopril (ZESTRIL) 2.5 MG tablet Take 1 tablet by mouth once daily 90 tablet 3    LORazepam (ATIVAN) 0.5 MG tablet Take 1 tablet (0.5 mg) by mouth daily as needed for anxiety 15 tablet 0    nicotine (NICODERM CQ) 7 MG/24HR 24 hr patch Place 1 patch onto the skin every 24 hours 28 patch 0    nicotine (NICORETTE) 2 MG gum Place 1 each (2 mg) inside cheek every hour as needed for nicotine withdrawal symptoms 100 each 1    nicotine (NICORETTE) 4 MG lozenge Place 1 lozenge (4 mg) inside cheek every hour as needed for nicotine withdrawal symptoms 27 lozenge 3    tiotropium (SPIRIVA) 18 MCG inhaled capsule Inhale 1 capsule (18 mcg) into the lungs daily 90 capsule 1    dulaglutide (TRULICITY) 1.5 MG/0.5ML pen Inject 1.5 mg Subcutaneous every 7 days (Patient not taking: Reported on 5/23/2024) 6 mL 1    nicotine (NICODERM CQ) 14 MG/24HR 24 hr patch Place 1 patch onto the skin every 24 hours (Patient not taking: Reported on 5/23/2024) 28 patch 0    nicotine (NICODERM CQ) 21 MG/24HR 24 hr patch Place 1 patch onto the  "skin every 24 hours (Patient not taking: Reported on 5/23/2024) 28 patch 1     No current facility-administered medications for this visit.     Allergies:  Allergies   Allergen Reactions    Metformin Diarrhea     XR and IR both cause diarrhea      Social Hx:  Marital status: lives with wife. Daughter and son-in-law   Resides in a hosue, unsure if there is concern for mold.  Occupational history: worked in [employer] x 34 years. Currently works part time at [employer].   service: none  Pets: 5 cats  Smoking history: 46 pack year history  Recreational drug use: none, no vape    Family HX: family history includes Diabetes in his sister; Heart Disease in his father; Hypertension in his father; Lung Cancer in his mother.    ROS:   10-point review performed and notable for the above mentioned symptoms. The remainder reviewed and negative.     Physical Exam:  /78 (BP Location: Left arm, Patient Position: Chair, Cuff Size: Adult Large)   Pulse 88   Ht 1.702 m (5' 7\")   Wt 93 kg (205 lb)   SpO2 92%   BMI 32.11 kg/m      Physical Exam  Constitutional:       General: He is not in acute distress.     Appearance: He is not ill-appearing or diaphoretic.   Cardiovascular:      Rate and Rhythm: Normal rate and regular rhythm.      Heart sounds: Normal heart sounds.   Pulmonary:      Effort: Pulmonary effort is normal. No respiratory distress.      Breath sounds: No wheezing or rhonchi.   Musculoskeletal:      Right lower leg: No edema.      Left lower leg: No edema.   Neurological:      Mental Status: He is alert.   Psychiatric:         Behavior: Behavior normal.       PFT's     5/23/2024      Impression:  Full Pulmonary Function Test is abnormal.  PFTs are consistent with mild obstructive disease. Pt used albuterol and Spiriva 1 hr prior to testing.   There is no hyperinflation.  There is no air-trapping.  Diffusion capacity when corrected for hemoglobin is moderate severely reduced.    Labs:   personally reviewed " in the EMR.   Latest Reference Range & Units 04/06/24 19:10   Absolute Eosinophils 0.0 - 0.7 10e3/uL 1.1 (H)     Imaging studies: personally reviewed and interpreted. Below are the Radiology interpretations.    XR CHEST PORT 1 VIEW, DATE: 4/6/2024  INDICATION: Dyspnea  COMPARISON: 11/24/2023                                                                IMPRESSION: Borderline hyperinflation of both lungs. Heart size and pulmonary vascularity within normal limits. No lung infiltrates or pneumothorax. Since 11/24/2023 the slight amount of right pleural fluid/thickening and the platelike atelectasis in the right lung base have resolved.    CT CHEST PULMONARY EMBOLISM W CONTRAST, DATE: 11/26/2023  INDICATION: Persistent high O2 requirements, treated for COPD exacerbation  COMPARISON: None.  FINDINGS:  ANGIOGRAM CHEST: Pulmonary arteries are normal caliber and negative for pulmonary emboli. Thoracic aorta is negative for dissection. No CT evidence of right heart strain.  LUNGS AND PLEURA: Bibasilar atelectasis. No focal consolidation or effusion.  MEDIASTINUM/AXILLAE: Heart is normal in size. No adenopathy.  CORONARY ARTERY CALCIFICATION: Mild.  UPPER ABDOMEN: Normal.  MUSCULOSKELETAL: Degenerative changes of the spine.                                                                 IMPRESSION:  1.  No pulmonary embolism.      Echo 11/2023:  Interpretation Summary  The current study is a limited study for echo contrast bubble study. Please  see full echocardiogram report from November 23, 2023.  Left ventricular systolic function appears normal. There is mild left  ventricular hypertrophy. Left ventricular ejection fraction is grossly normal  estimated at 60 to 65%.  Normal right ventricular size and systolic function.  Echo contrast bubble study is negative at rest and with Valsalva. No observed  shunt identified.  Mild enlargement of the aortic root.

## 2024-05-23 NOTE — PATIENT INSTRUCTIONS
It was a pleasure seeing you in clinic today. This is what we discussed:    We will check some blood work to see if things have normalized after the hospital.   STOP the Spiriva and START the Anoro. Use this every day no matter what. This is long-acting and does not work fast.   We will get an overnight oxygen test to see if you need the oxygen with sleep.   If you oxygen reading is less than 88% make sure you wear 2 L of oxygen.   Use your albuterol every 4 hours as needed for cough, shortness of breath, wheeze, or chest tightness.   Follow up 3 months   If you have worsening symptoms, questions, or need to speak with the nurse please call [phone].  I recommend the RSV vaccine. You can get this at any pharmacy.

## 2024-05-23 NOTE — PROGRESS NOTES
Patient oxygen saturation on RA at rest is 92% pulse 88.  Oxygen saturation after ambulating 400ft on RA is 91% pulse 104.      Patient is ambulatory within his/her home.      Rukhsana Velarde LPN

## 2024-05-26 LAB
DLCOCOR-%PRED-PRE: 55 %
DLCOCOR-PRE: 12.94 ML/MIN/MMHG
DLCOUNC-%PRED-PRE: 58 %
DLCOUNC-PRE: 13.49 ML/MIN/MMHG
DLCOUNC-PRED: 23.2 ML/MIN/MMHG
ERV-%PRED-PRE: 57 %
ERV-PRE: 0.72 L
ERV-PRED: 1.26 L
EXPTIME-PRE: 10.38 SEC
FEF2575-%PRED-PRE: 33 %
FEF2575-PRE: 0.74 L/SEC
FEF2575-PRED: 2.24 L/SEC
FEFMAX-%PRED-PRE: 70 %
FEFMAX-PRE: 5.45 L/SEC
FEFMAX-PRED: 7.74 L/SEC
FEV1-%PRED-PRE: 72 %
FEV1-PRE: 1.96 L
FEV1FEV6-PRE: 64 %
FEV1FEV6-PRED: 78 %
FEV1FVC-PRE: 59 %
FEV1FVC-PRED: 78 %
FEV1SVC-PRE: 55 %
FEV1SVC-PRED: 70 %
FIFMAX-PRE: 4.94 L/SEC
FRCPLETH-%PRED-PRE: 111 %
FRCPLETH-PRE: 3.78 L
FRCPLETH-PRED: 3.4 L
FVC-%PRED-PRE: 96 %
FVC-PRE: 3.33 L
FVC-PRED: 3.45 L
IC-%PRED-PRE: 111 %
IC-PRE: 2.82 L
IC-PRED: 2.53 L
RVPLETH-%PRED-PRE: 126 %
RVPLETH-PRE: 3.05 L
RVPLETH-PRED: 2.4 L
TLCPLETH-%PRED-PRE: 106 %
TLCPLETH-PRE: 6.6 L
TLCPLETH-PRED: 6.21 L
VA-%PRED-PRE: 100 %
VA-PRE: 5.54 L
VC-%PRED-PRE: 92 %
VC-PRE: 3.55 L
VC-PRED: 3.83 L

## 2024-05-28 ENCOUNTER — TELEPHONE (OUTPATIENT)
Dept: PULMONOLOGY | Facility: CLINIC | Age: 64
End: 2024-05-28
Payer: COMMERCIAL

## 2024-05-28 DIAGNOSIS — J44.9 COPD (CHRONIC OBSTRUCTIVE PULMONARY DISEASE) (H): ICD-10-CM

## 2024-05-28 DIAGNOSIS — J43.9 PULMONARY EMPHYSEMA, UNSPECIFIED EMPHYSEMA TYPE (H): Primary | ICD-10-CM

## 2024-05-28 DIAGNOSIS — F41.1 ANXIETY STATE: ICD-10-CM

## 2024-05-28 RX ORDER — AZITHROMYCIN 250 MG/1
TABLET, FILM COATED ORAL
Qty: 6 TABLET | Refills: 0 | Status: SHIPPED | OUTPATIENT
Start: 2024-05-28 | End: 2024-06-02

## 2024-05-28 RX ORDER — LORAZEPAM 0.5 MG/1
0.5 TABLET ORAL DAILY PRN
Qty: 15 TABLET | Refills: 0 | Status: SHIPPED | OUTPATIENT
Start: 2024-05-28 | End: 2024-08-16

## 2024-05-28 RX ORDER — PREDNISONE 20 MG/1
40 TABLET ORAL DAILY
Qty: 10 TABLET | Refills: 0 | Status: SHIPPED | OUTPATIENT
Start: 2024-05-28 | End: 2024-06-02

## 2024-05-28 NOTE — TELEPHONE ENCOUNTER
Mahendra called today with symptoms of shortness of breath with activity, increase cough with copious amounts of thick milky colored phlegm, tightness of chest, stuffed up nose and sweaty at times. Will send in his action plan per Diane Strauss CNP:  If his symptoms exacerbate: prednisone 40mg daily x 5 days. If sputum amount or thickness increased add azithromycin zpak x 5 days.     He agrees to go to the urgent care or ER if symptoms do not improve or get worse.

## 2024-06-14 ENCOUNTER — TELEPHONE (OUTPATIENT)
Dept: PULMONOLOGY | Facility: CLINIC | Age: 64
End: 2024-06-14
Payer: COMMERCIAL

## 2024-06-14 DIAGNOSIS — J44.9 COPD (CHRONIC OBSTRUCTIVE PULMONARY DISEASE) (H): Primary | ICD-10-CM

## 2024-06-14 RX ORDER — PREDNISONE 20 MG/1
TABLET ORAL
Qty: 10 TABLET | Refills: 0 | Status: SHIPPED | OUTPATIENT
Start: 2024-06-14

## 2024-06-14 RX ORDER — AZITHROMYCIN 250 MG/1
TABLET, FILM COATED ORAL
Qty: 6 TABLET | Refills: 0 | Status: SHIPPED | OUTPATIENT
Start: 2024-06-14 | End: 2024-06-19

## 2024-06-14 NOTE — TELEPHONE ENCOUNTER
"Phone call from patient. Was cutting his lawn last night and started having some difficulties with his breathing.  Now coughing and bringing up thick white phlegm.  No fever, but states he has be \"sweating\".   Sats were down to 88% today, so he has been using his oxygen.  Sats are better with the oxygen on.    Will repeat his action plan (just used 2 weeks ago)..  prednisone 40mg daily x 5 days and Zpack.  He will call if this is not effective  "

## 2024-06-26 DIAGNOSIS — E11.65 TYPE 2 DIABETES MELLITUS WITH HYPERGLYCEMIA, WITHOUT LONG-TERM CURRENT USE OF INSULIN (H): Primary | ICD-10-CM

## 2024-06-26 RX ORDER — GLIPIZIDE 10 MG/1
20 TABLET, FILM COATED, EXTENDED RELEASE ORAL DAILY
Qty: 60 TABLET | Refills: 0 | Status: SHIPPED | OUTPATIENT
Start: 2024-06-26 | End: 2024-07-24

## 2024-07-15 DIAGNOSIS — J44.1 COPD WITH ACUTE EXACERBATION (H): ICD-10-CM

## 2024-07-16 RX ORDER — ALBUTEROL SULFATE 0.83 MG/ML
SOLUTION RESPIRATORY (INHALATION)
Qty: 90 ML | Refills: 1 | Status: SHIPPED | OUTPATIENT
Start: 2024-07-16

## 2024-07-17 DIAGNOSIS — E11.29 TYPE 2 DIABETES MELLITUS WITH MICROALBUMINURIA, WITHOUT LONG-TERM CURRENT USE OF INSULIN (H): ICD-10-CM

## 2024-07-17 DIAGNOSIS — R80.9 TYPE 2 DIABETES MELLITUS WITH MICROALBUMINURIA, WITHOUT LONG-TERM CURRENT USE OF INSULIN (H): ICD-10-CM

## 2024-07-17 RX ORDER — LISINOPRIL 2.5 MG/1
TABLET ORAL
Qty: 90 TABLET | Refills: 0 | Status: SHIPPED | OUTPATIENT
Start: 2024-07-17 | End: 2024-09-11

## 2024-07-21 ENCOUNTER — HEALTH MAINTENANCE LETTER (OUTPATIENT)
Age: 64
End: 2024-07-21

## 2024-07-24 DIAGNOSIS — E11.65 TYPE 2 DIABETES MELLITUS WITH HYPERGLYCEMIA, WITHOUT LONG-TERM CURRENT USE OF INSULIN (H): ICD-10-CM

## 2024-07-24 RX ORDER — GLIPIZIDE 10 MG/1
20 TABLET, FILM COATED, EXTENDED RELEASE ORAL DAILY
Qty: 60 TABLET | Refills: 2 | Status: SHIPPED | OUTPATIENT
Start: 2024-07-24 | End: 2024-09-12

## 2024-07-24 NOTE — TELEPHONE ENCOUNTER
FAX Pharmacy  Prescription Refill Request    Drug Name:  GLIPIZIDE ER 10MG TAB    Last Visit with PCP: 04/22/24  Next Visit with PCP:  09/11/24

## 2024-08-15 ENCOUNTER — MYC MEDICAL ADVICE (OUTPATIENT)
Dept: PULMONOLOGY | Facility: CLINIC | Age: 64
End: 2024-08-15
Payer: COMMERCIAL

## 2024-08-16 DIAGNOSIS — J43.9 PULMONARY EMPHYSEMA, UNSPECIFIED EMPHYSEMA TYPE (H): ICD-10-CM

## 2024-08-16 DIAGNOSIS — F41.1 ANXIETY STATE: ICD-10-CM

## 2024-08-16 RX ORDER — ALBUTEROL SULFATE 90 UG/1
AEROSOL, METERED RESPIRATORY (INHALATION)
Qty: 18 G | Refills: 0 | Status: SHIPPED | OUTPATIENT
Start: 2024-08-16

## 2024-08-16 RX ORDER — LORAZEPAM 0.5 MG/1
0.5 TABLET ORAL DAILY PRN
Qty: 15 TABLET | Refills: 0 | Status: SHIPPED | OUTPATIENT
Start: 2024-08-16

## 2024-08-18 ENCOUNTER — TRANSFERRED RECORDS (OUTPATIENT)
Dept: MULTI SPECIALTY CLINIC | Facility: CLINIC | Age: 64
End: 2024-08-18
Payer: COMMERCIAL

## 2024-08-18 LAB — RETINOPATHY: NORMAL

## 2024-08-20 ENCOUNTER — TRANSFERRED RECORDS (OUTPATIENT)
Dept: HEALTH INFORMATION MANAGEMENT | Facility: CLINIC | Age: 64
End: 2024-08-20
Payer: COMMERCIAL

## 2024-08-22 ENCOUNTER — DOCUMENTATION ONLY (OUTPATIENT)
Dept: PULMONOLOGY | Facility: CLINIC | Age: 64
End: 2024-08-22
Payer: COMMERCIAL

## 2024-08-22 NOTE — PROGRESS NOTES
FABRICIO RX SIGNED BY: DAINA EISENBERG  DATE: 5/23/24  STUDY PERFORMED ON (PAP/O2/RA): ROOM AIR  DOWNLOAD METHOD (BREATHE/NONIN): NONIN  PROVIDER FAX:    DID YOU DOCUMENT CONTACT ATTEMPTS IN [facility]? YES OR NO  AFTER FIRST ATTEMPT PLEASE VERIFY PHONE NUMBER IN [employer] MATCHES PHONE NUMBER IN [employer].    8/14/24 [facility] [place] - CALLED PT TO DISCUSS FABRICIO. PT STATED HE WOULD LIKE TO PICKUP FROM [facility]. INFORMED HIM IT WOULD BE AVAILABLE FOR PICKUP BY TOMORROW AFTERNOON. PT STATED UNDERSTANDING.    8/15/24 DW - PICKED UP IN [facility].  8/19/24 PL- LVM FOR PT REMINDING HIM TO RETURN FABRICIO TO [facility].  8/22 AV- LVM FOR PT REMINDING HIM TO RETURN FABRICIO TO [facility].

## 2024-08-23 ENCOUNTER — TELEPHONE (OUTPATIENT)
Dept: PULMONOLOGY | Facility: CLINIC | Age: 64
End: 2024-08-23
Payer: COMMERCIAL

## 2024-08-23 ENCOUNTER — MYC MEDICAL ADVICE (OUTPATIENT)
Dept: PULMONOLOGY | Facility: CLINIC | Age: 64
End: 2024-08-23
Payer: COMMERCIAL

## 2024-08-23 DIAGNOSIS — J44.9 CHRONIC OBSTRUCTIVE PULMONARY DISEASE, UNSPECIFIED COPD TYPE (H): Primary | ICD-10-CM

## 2024-08-23 DIAGNOSIS — J96.21 ACUTE ON CHRONIC RESPIRATORY FAILURE WITH HYPOXIA (H): ICD-10-CM

## 2024-08-23 NOTE — TELEPHONE ENCOUNTER
DATE: 08/23/2024  DME PROVIDER: Deng   SUPPLY ORDERED: FABRICIO with current oxygen   PROVIDER: Rica    Faxed order    Rukhsana Velarde LPN

## 2024-08-23 NOTE — TELEPHONE ENCOUNTER
FABRICIO on room air showed nocturnal hypoxia. Will repeat on current oxygen order.     Diane Strauss, CNP  Pulmonary Medicine  [facility]   [phone]

## 2024-08-29 ENCOUNTER — TRANSFERRED RECORDS (OUTPATIENT)
Dept: HEALTH INFORMATION MANAGEMENT | Facility: CLINIC | Age: 64
End: 2024-08-29
Payer: COMMERCIAL

## 2024-09-11 ENCOUNTER — OFFICE VISIT (OUTPATIENT)
Dept: INTERNAL MEDICINE | Facility: CLINIC | Age: 64
End: 2024-09-11
Payer: COMMERCIAL

## 2024-09-11 VITALS
DIASTOLIC BLOOD PRESSURE: 76 MMHG | RESPIRATION RATE: 18 BRPM | OXYGEN SATURATION: 93 % | HEIGHT: 67 IN | WEIGHT: 209.1 LBS | SYSTOLIC BLOOD PRESSURE: 120 MMHG | TEMPERATURE: 98.4 F | BODY MASS INDEX: 32.82 KG/M2 | HEART RATE: 76 BPM

## 2024-09-11 DIAGNOSIS — J31.0 CHRONIC RHINITIS: ICD-10-CM

## 2024-09-11 DIAGNOSIS — F17.200 TOBACCO USE DISORDER: ICD-10-CM

## 2024-09-11 DIAGNOSIS — E78.2 MIXED HYPERLIPIDEMIA: ICD-10-CM

## 2024-09-11 DIAGNOSIS — R80.9 TYPE 2 DIABETES MELLITUS WITH MICROALBUMINURIA, WITHOUT LONG-TERM CURRENT USE OF INSULIN (H): ICD-10-CM

## 2024-09-11 DIAGNOSIS — F41.1 ANXIETY STATE: Primary | ICD-10-CM

## 2024-09-11 DIAGNOSIS — E11.29 TYPE 2 DIABETES MELLITUS WITH MICROALBUMINURIA, WITHOUT LONG-TERM CURRENT USE OF INSULIN (H): ICD-10-CM

## 2024-09-11 DIAGNOSIS — I10 ESSENTIAL HYPERTENSION: ICD-10-CM

## 2024-09-11 DIAGNOSIS — Z13.220 LIPID SCREENING: ICD-10-CM

## 2024-09-11 PROBLEM — J96.01 ACUTE RESPIRATORY FAILURE WITH HYPOXIA (H): Status: RESOLVED | Noted: 2023-11-19 | Resolved: 2024-09-11

## 2024-09-11 PROBLEM — J44.9 COPD MIXED TYPE (H): Status: ACTIVE | Noted: 2024-03-06

## 2024-09-11 PROBLEM — J44.1 COPD WITH ACUTE EXACERBATION (H): Status: RESOLVED | Noted: 2024-04-06 | Resolved: 2024-09-11

## 2024-09-11 LAB
CHOLEST SERPL-MCNC: 132 MG/DL
CREAT UR-MCNC: 128 MG/DL
FASTING STATUS PATIENT QL REPORTED: NORMAL
HBA1C MFR BLD: 6.2 % (ref 0–5.6)
HDLC SERPL-MCNC: 47 MG/DL
LDLC SERPL CALC-MCNC: 69 MG/DL
MICROALBUMIN UR-MCNC: <12 MG/L
MICROALBUMIN/CREAT UR: NORMAL MG/G{CREAT}
NONHDLC SERPL-MCNC: 85 MG/DL
TRIGL SERPL-MCNC: 81 MG/DL

## 2024-09-11 PROCEDURE — 99214 OFFICE O/P EST MOD 30 MIN: CPT | Mod: 25 | Performed by: NURSE PRACTITIONER

## 2024-09-11 PROCEDURE — 80061 LIPID PANEL: CPT | Performed by: NURSE PRACTITIONER

## 2024-09-11 PROCEDURE — 82043 UR ALBUMIN QUANTITATIVE: CPT | Performed by: NURSE PRACTITIONER

## 2024-09-11 PROCEDURE — 90471 IMMUNIZATION ADMIN: CPT | Performed by: NURSE PRACTITIONER

## 2024-09-11 PROCEDURE — 82570 ASSAY OF URINE CREATININE: CPT | Performed by: NURSE PRACTITIONER

## 2024-09-11 PROCEDURE — 83036 HEMOGLOBIN GLYCOSYLATED A1C: CPT | Performed by: NURSE PRACTITIONER

## 2024-09-11 PROCEDURE — 90662 IIV NO PRSV INCREASED AG IM: CPT | Performed by: NURSE PRACTITIONER

## 2024-09-11 PROCEDURE — 36415 COLL VENOUS BLD VENIPUNCTURE: CPT | Performed by: NURSE PRACTITIONER

## 2024-09-11 RX ORDER — VARENICLINE TARTRATE 0.5 (11)-1
KIT ORAL
Qty: 53 TABLET | Refills: 0 | Status: SHIPPED | OUTPATIENT
Start: 2024-09-11

## 2024-09-11 RX ORDER — AZELASTINE 1 MG/ML
1 SPRAY, METERED NASAL 2 TIMES DAILY
Qty: 30 ML | Refills: 1 | Status: SHIPPED | OUTPATIENT
Start: 2024-09-11

## 2024-09-11 RX ORDER — VARENICLINE TARTRATE 1 MG/1
1 TABLET, FILM COATED ORAL 2 TIMES DAILY
Qty: 60 TABLET | Refills: 1 | Status: SHIPPED | OUTPATIENT
Start: 2024-09-11

## 2024-09-11 RX ORDER — LISINOPRIL 2.5 MG/1
2.5 TABLET ORAL DAILY
Qty: 90 TABLET | Refills: 1 | Status: SHIPPED | OUTPATIENT
Start: 2024-09-11

## 2024-09-11 RX ORDER — ESCITALOPRAM OXALATE 20 MG/1
20 TABLET ORAL DAILY
Qty: 90 TABLET | Refills: 2 | Status: SHIPPED | OUTPATIENT
Start: 2024-09-11

## 2024-09-11 ASSESSMENT — ANXIETY QUESTIONNAIRES
GAD7 TOTAL SCORE: 6
7. FEELING AFRAID AS IF SOMETHING AWFUL MIGHT HAPPEN: SEVERAL DAYS
GAD7 TOTAL SCORE: 6
8. IF YOU CHECKED OFF ANY PROBLEMS, HOW DIFFICULT HAVE THESE MADE IT FOR YOU TO DO YOUR WORK, TAKE CARE OF THINGS AT HOME, OR GET ALONG WITH OTHER PEOPLE?: SOMEWHAT DIFFICULT
GAD7 TOTAL SCORE: 6

## 2024-09-11 NOTE — ASSESSMENT & PLAN NOTE
Diabetes has been generally well controlled  - Discussed an option to potentially increase Trulicity with a goal of eventually eliminating glipizide. He would rather not make any medication adjustments at this time

## 2024-09-11 NOTE — ASSESSMENT & PLAN NOTE
He quit for short time in April after he was hospitalized for COPD exacerbation but unfortunately has resumed tobacco use.  He is smoking 6 cigarettes/day.  He has been using a nicotine patch and nicotine lozenges.  Discussed Chantix as an alternative option to help with tobacco cessation.  He is open to trying this.  - Start Chantix with starter pack then continue for 3 months total.  Encouraged him to set a quit date of 7 days after beginning the medication.  May use nicotine lozenges if needed but goal would be to wean off of these as well.

## 2024-09-11 NOTE — PROGRESS NOTES
Assessment & Plan   Problem List Items Addressed This Visit       Anxiety, insomnia  - Primary     He reports he is doing well with escitalopram. Lorazepam sparingly as needed          Relevant Medications    escitalopram (LEXAPRO) 20 MG tablet    Nicotine Dependence     He quit for short time in April after he was hospitalized for COPD exacerbation but unfortunately has resumed tobacco use.  He is smoking 6 cigarettes/day.  He has been using a nicotine patch and nicotine lozenges.  Discussed Chantix as an alternative option to help with tobacco cessation.  He is open to trying this.  - Start Chantix with starter pack then continue for 3 months total.  Encouraged him to set a quit date of 7 days after beginning the medication.  May use nicotine lozenges if needed but goal would be to wean off of these as well.         Relevant Medications    varenicline (CHANTIX ISIDRO) 0.5 MG X 11 & 1 MG X 42 tablet    varenicline (CHANTIX) 1 MG tablet    Hyperlipidemia     Repeat lipid profile  -Continue atorvastatin         Type 2 diabetes mellitus with microalbuminuria, without long-term current use of insulin (H)     Diabetes has been generally well controlled  - Discussed an option to potentially increase Trulicity with a goal of eventually eliminating glipizide. He would rather not make any medication adjustments at this time         Relevant Medications    dulaglutide (TRULICITY) 1.5 MG/0.5ML pen    lisinopril (ZESTRIL) 2.5 MG tablet    Other Relevant Orders    Albumin Random Urine Quantitative with Creat Ratio    HEMOGLOBIN A1C    Essential hypertension     Stable          Relevant Medications    lisinopril (ZESTRIL) 2.5 MG tablet     Other Visit Diagnoses       Chronic rhinitis        Relevant Medications    azelastine (ASTELIN) 0.1 % nasal spray    Lipid screening        Relevant Orders    Lipid panel reflex to direct LDL Non-fasting              Nicotine/Tobacco Cessation  He reports that he has been smoking cigarettes.  "He started smoking about 46 years ago. He has a 46.7 pack-year smoking history. He has never used smokeless tobacco.  Nicotine/Tobacco Cessation Plan  Pharmacotherapies : varenicline (Chantix)      BMI  Estimated body mass index is 32.75 kg/m  as calculated from the following:    Height as of this encounter: 1.702 m (5' 7\").    Weight as of this encounter: 94.8 kg (209 lb 1.6 oz).     Return in about 6 months (around 3/11/2025).        Kaleb Mccray is a 64 year old, presenting for the following health issues:  Follow Up        9/11/2024     9:47 AM   Additional Questions   Roomed by Gee Matias   Accompanied by N/A     Via the Health Maintenance questionnaire, the patient has reported the following services have been completed -Eye Exam: eye care clinic [place] 2024-08-18, this information has been sent to the abstraction team.    History of Present Illness       COPD:  He presents for follow up of COPD.   Overall, COPD symptoms are better since last visit. He has less than usual fatigue or shortness of breath with exertion and no shortness of breath at rest.  He sometimes coughs and does not have change in sputum. No recent fever. He can walk 2-5 blocks without stopping to rest. He can walk 2 flights of stairs without resting. The patient has had no ED, urgent care, or hospital admissions because of COPD since the last visit.     Mental Health Follow-up:  Patient presents to follow-up on Anxiety.    Patient's anxiety since last visit has been:  Medium  The patient is not having other symptoms associated with anxiety.  Any significant life events: financial concerns, housing concerns and health concerns  Patient is feeling anxious or having panic attacks.  Patient has no concerns about alcohol or drug use.    Diabetes:   He presents for follow up of diabetes.  He is checking home blood glucose a few times a month.   He checks blood glucose before meals and at bedtime.  Blood glucose is never over 200 and never " "under 70. He is aware of hypoglycemia symptoms including shakiness.    He has no concerns regarding his diabetes at this time.  He is having excessive thirst and blurry vision.  The patient has had a diabetic eye exam in the last 12 months. Eye exam performed on aug 18 2024. Location of last eye exam eye care clinic [place].        Hyperlipidemia:  He presents for follow up of hyperlipidemia.   He is taking medication to lower cholesterol. He is not having myalgia or other side effects to statin medications.    He eats 0-1 servings of fruits and vegetables daily.He consumes 4 sweetened beverage(s) daily.He exercises with enough effort to increase his heart rate 9 or less minutes per day.  He exercises with enough effort to increase his heart rate 4 days per week.   He is taking medications regularly.     COPD- he still reports a lot of phlegm in the morning. He has started smoking again, about 6 cigarettes per day. He tends to crave the \"special cigarettes\" like first thing in the morning and after meals. He did notice that Anoro really helped his symptoms.     Anxiety- generally doing ok. Reports that most days are good but he has some days with anxiety.     He has a lot of congestion. His ears feel plugged.         Objective    /76   Pulse 76   Temp 98.4  F (36.9  C) (Oral)   Resp 18   Ht 1.702 m (5' 7\")   Wt 94.8 kg (209 lb 1.6 oz)   SpO2 93%   BMI 32.75 kg/m    Body mass index is 32.75 kg/m .    Wt Readings from Last 5 Encounters:   09/11/24 94.8 kg (209 lb 1.6 oz)   05/23/24 93 kg (205 lb)   04/22/24 93 kg (205 lb)   04/15/24 93 kg (205 lb)   04/08/24 90.9 kg (200 lb 6.4 oz)       Physical Exam   GENERAL: alert and no distress  RESP: lungs clear to auscultation - no rales, rhonchi or wheezes  CV: regular rate and rhythm, normal S1 S2, no S3 or S4, no murmur            The longitudinal plan of care for the diagnosis(es)/condition(s) as documented were addressed during this visit. Due to the added " non-verbal indicator of pain/discomfort not present complexity in care, I will continue to support Mahendra in the subsequent management and with ongoing continuity of care.  Signed Electronically by: Rosie Dorado NP

## 2024-09-12 DIAGNOSIS — E11.65 TYPE 2 DIABETES MELLITUS WITH HYPERGLYCEMIA, WITHOUT LONG-TERM CURRENT USE OF INSULIN (H): ICD-10-CM

## 2024-09-12 RX ORDER — GLIPIZIDE 10 MG/1
10 TABLET, FILM COATED, EXTENDED RELEASE ORAL DAILY
Qty: 90 TABLET | Refills: 1 | Status: SHIPPED | OUTPATIENT
Start: 2024-09-12

## 2024-09-16 ENCOUNTER — TELEPHONE (OUTPATIENT)
Dept: INTERNAL MEDICINE | Facility: CLINIC | Age: 64
End: 2024-09-16
Payer: COMMERCIAL

## 2024-09-16 NOTE — TELEPHONE ENCOUNTER
Retail Pharmacy Prior Authorization Team   Phone: [phone]    PRIOR AUTHORIZATION DENIED    Medication: VARENICLINE TARTRATE (STARTER) 0.5 MG X 11 & 1 MG X 42 PO TBPK  Insurance Company: [employer] ([facility]) - Phone [phone] Fax [phone]  Denial Date: 9/15/2024  Denial Reason(s): MUST TRY/FAIL TWO PREFERRED ALTERNATIVES - BUPROPION ER (GENERIC ZYBAN) AND NICOTROL INHALER/NS      Appeal Information: IF THE PROVIDER WOULD LIKE TO APPEAL THIS DECISION PLEASE PROVIDE THE PA TEAM WITH A LETTER OF MEDICAL NECESSITY      Patient Notified: NO

## 2024-09-17 NOTE — TELEPHONE ENCOUNTER
Call pt- his insurance will only cover Chantix if he has already tried 2 other covered alternatives. Would he be willing to try bupropion (wellbutrin) instead of Chantix?

## 2024-09-18 NOTE — TELEPHONE ENCOUNTER
LDVM       When pt calls back, please relay medication choice for this.       Thank you,  Sumanth Wilkerson Jr., CMA on 9/18/2024 at 10:35 AM

## 2024-09-23 NOTE — TELEPHONE ENCOUNTER
Left detailed message for patient to call back. Please relay message from Rosie Dorado and anderson as needed.

## 2024-10-22 DIAGNOSIS — J44.1 COPD WITH ACUTE EXACERBATION (H): ICD-10-CM

## 2024-10-22 NOTE — TELEPHONE ENCOUNTER
Pharmacy calling to get a medication refill on medications attached     Disp Refills Start End JANEEN   albuterol (PROAIR HFA/PROVENTIL HFA/VENTOLIN HFA) 108 (90 Base) MCG/ACT inhaler 18 g 0 8/16/2024 -- No   Sig: INHALE 1 TO 2 PUFFS BY MOUTH EVERY 4 HOURS AS NEEDED SHORTNESS OF BREATH, WHEEZING, OR COUGH   Sent to pharmacy as: Albuterol Sulfate  (90 Base) MCG/ACT Aerosol Solution (PROAIR HFA/PROVENTIL HFA/VENTOLIN HFA)   Class: E-Prescribe   Notes to Pharmacy: Pharmacy may dispense brand covered by insurance (Proair, or proventil or ventolin or generic albuterol inhaler)   Order: [id number]   E-Prescribing Status: Receipt confirmed by pharmacy (8/16/2024  7:57 AM CDT)

## 2024-10-23 RX ORDER — ALBUTEROL SULFATE 0.83 MG/ML
SOLUTION RESPIRATORY (INHALATION)
Qty: 90 ML | Refills: 1 | Status: SHIPPED | OUTPATIENT
Start: 2024-10-23

## 2024-11-04 ENCOUNTER — TELEPHONE (OUTPATIENT)
Dept: PULMONOLOGY | Facility: CLINIC | Age: 64
End: 2024-11-04
Payer: COMMERCIAL

## 2024-11-04 DIAGNOSIS — J43.9 PULMONARY EMPHYSEMA, UNSPECIFIED EMPHYSEMA TYPE (H): ICD-10-CM

## 2024-11-04 DIAGNOSIS — J44.9 COPD (CHRONIC OBSTRUCTIVE PULMONARY DISEASE) (H): Primary | ICD-10-CM

## 2024-11-04 RX ORDER — AZITHROMYCIN 250 MG/1
TABLET, FILM COATED ORAL
Qty: 6 TABLET | Refills: 0 | Status: SHIPPED | OUTPATIENT
Start: 2024-11-04 | End: 2024-11-09

## 2024-11-04 RX ORDER — PREDNISONE 20 MG/1
40 TABLET ORAL DAILY
Qty: 10 TABLET | Refills: 0 | Status: SHIPPED | OUTPATIENT
Start: 2024-11-04 | End: 2024-11-09

## 2024-11-04 NOTE — TELEPHONE ENCOUNTER
Spoke with Mahendra regarding his request for action plan. He is having increase in cough with thick white to yellow phlegm coming up, shortness of breath, sore throat, headaches, nose plugged and temperature of 100.9.  he will take an at home Covid test;   Will send  in his action plan per Diane Strauss CNP:     If his symptoms exacerbate: prednisone 40mg daily x 5 days. If sputum amount or thickness increased add azithromycin zpak x 5 days.      If symptoms do not improve. He agrees to go to the urgent care or ER to be evaluated.

## 2024-11-13 ENCOUNTER — OFFICE VISIT (OUTPATIENT)
Dept: PULMONOLOGY | Facility: CLINIC | Age: 64
End: 2024-11-13
Payer: COMMERCIAL

## 2024-11-13 VITALS
HEART RATE: 82 BPM | DIASTOLIC BLOOD PRESSURE: 80 MMHG | WEIGHT: 207 LBS | OXYGEN SATURATION: 94 % | BODY MASS INDEX: 32.42 KG/M2 | SYSTOLIC BLOOD PRESSURE: 132 MMHG

## 2024-11-13 DIAGNOSIS — F17.200 TOBACCO USE DISORDER: ICD-10-CM

## 2024-11-13 DIAGNOSIS — G47.34 NOCTURNAL HYPOXIA: ICD-10-CM

## 2024-11-13 DIAGNOSIS — J44.9 CHRONIC OBSTRUCTIVE PULMONARY DISEASE, UNSPECIFIED COPD TYPE (H): Primary | ICD-10-CM

## 2024-11-13 PROCEDURE — 99214 OFFICE O/P EST MOD 30 MIN: CPT | Performed by: NURSE PRACTITIONER

## 2024-11-13 RX ORDER — NICOTINE 21 MG/24HR
1 PATCH, TRANSDERMAL 24 HOURS TRANSDERMAL EVERY 24 HOURS
Qty: 28 PATCH | Refills: 4 | Status: SHIPPED | OUTPATIENT
Start: 2024-11-13

## 2024-11-13 RX ORDER — FLUTICASONE FUROATE, UMECLIDINIUM BROMIDE AND VILANTEROL TRIFENATATE 100; 62.5; 25 UG/1; UG/1; UG/1
1 POWDER RESPIRATORY (INHALATION) DAILY
Qty: 60 EACH | Refills: 11 | Status: SHIPPED | OUTPATIENT
Start: 2024-11-13

## 2024-11-13 NOTE — PROGRESS NOTES
Pulmonary Outpatient Consult Note  November 13, 2024      Assessment and Plan:   Remi Mathias is a 64 year old male, current smoker, with history of COPD, DM2, HTN, and obesity presenting today for evaluation of COPD.  Recent hospital stay x 2 for exacerbation. No prior PFTs and was not on long acting inhalers. Since start Spiriva he feels his breathing is a bit better, has not been needed albuterol much. He is able to work his part time job at [employer] and has had normal SpO2 since discharge.  PFTs show mild obstruction. He had used Spiriva prior to testing. Lung volumes were normal. Diffusing capacity when corrected for hemoglobin was moderately/severely reduced.   Eos on 4/6 elevated at 1.1. No previous results for comparison.   Previous CT imaging demonstrates mild upper lung predominant paraseptal emphysema.     #. COPD, GOLD E (low symptom burden, hospital stay x 2): CAT is 15 today, up from 7 at last visit. COPD diagnosis confirmed with PFTs. As he used his inhaler and smoked prior to testing results may be skewed. Recent hospitalization for exacerbation, he feels back to baseline post discharge. Eos elevated inpatient, unsure of if he was on prednisone at that time. We will recheck in the next few weeks. If elevated will refer to allergy. Given continued issues with exacerbations will increase to triple therapy by adding ICS to LABA/LAMA.  STOP Anoro  START Trelegy (100) 1 puff daily. Rinse and gargle after each use.   Continue albuterol prn  Declines pulmonary rehab at this time. He will think about it.  IgE, [place] allergy panel, and CBC with diff. Has not done this yet.   #. ? Nocturnal hypoxia or ARSEN: Concern for ARSEN during hospital stay.  Is still wearing O2 at night. FABRICIO on room air showed nocturnal hypoxia, it was suggested he follow up with sleep medicine.   Recommend sleep medicine consult. Re-address at follow up.    #. Diffusion defect: Given minimal emphysema there is likely another  contributing factor like smoking prior to testing or cardiac cause. Recent echo shows normal EF. SOB could also be from deconditioning, although he gets regular activity at his job.   #. Tobacco abuse: We discussed cessation and the likelihood his lung function will continue to worsen if he is smoking. He is trying to cut down and is using patches. Declines Chantix at this time.   Continue NRT  Qualifies for LDCT for lung cancer screening. Due in 04/2025.     If his symptoms exacerbate: prednisone 40mg daily x 5 days. If sputum amount or thickness increased add azithromycin zpak x 5 days.     RTC 3 months, sooner if needed    Diane Strauss, [facility]  Pulmonary Medicine  ______________________________________________________________________________    CC:   Chief Complaint   Patient presents with    COPD    Follow Up     HPI:   Mahendra was last seen in clinic in 05/2024. Since that time he has used his action plan x 3 (May, June, and most recent on 11/9).   FABRICIO on room air showed nocturnal hypoxia. This was ordered to be repeated on current oxygen but he has not done this.   Has some cough that is occasionally productive of some sputum.   No wheeze or chest tightness.   Is using supplemental oxygen at night because he has it. Does not feel more short of breath at night.   Has not needed as much albuterol lately. Was going through one albuterol inhaler every few weeks previously.   Is cutting down cigarettes, down to 1/3-1/2 ppd. He was prescribed Chantix by PCP but this is not covered under his insurance plan.  Has been using nicotine lozenges and patches.    Hospitalized in 11/2023 and again from 4/6-4/11/2024 for COPD exacerbation and acute respiratory failure. He thinks he had initial URI symptoms prior to both stays. COVID and flu negative. He was treated with a prolonged steroid taper x 24 days, azithromycin, and ceftriaxone.     Denies breathing issues as a child or young adult.   Will occasionally use Flonase for nasal  congestion. He is not sure if he has seasonal allergies.     FH- nephew has asthma     Patient supplied answers from flow sheet for:  COPD Assessment Test (CAT)  2009 [employer]. All rights reserved.      5/23/2024     8:18 AM 11/13/2024     2:00 PM   COPD assessment test (CAT)   Cough 1  3   Phlegm 0  4   Chest tightness 0  1   Walk up hill 3  1   Limited activities 0  2   Leaving my home 0  1   Sleep 0  1   Energy 3  2   Total Score 7 15       Patient-reported      PMH:  Patient Active Problem List    Diagnosis Date Noted    COPD mixed type (H) 03/06/2024     Priority: Medium     2/2024- No PFTs, pt cannot afford to do additional testing. Radiographic finding of emphysema.       Nicotine Dependence      Priority: Medium     5/12/21- 3/4-1 pack per day. Trial nicotine patches     11/19/23- cessation with nicotine patch     Quit smoking with hospitalization 4/2024        Type 2 diabetes mellitus with microalbuminuria, without long-term current use of insulin (H)      Priority: Medium    Essential hypertension      Priority: Medium    Hyperlipidemia      Priority: Medium     Created by Conversion        Controlled substance agreement signed, lorazepam 5/2021 09/24/2018     Priority: Medium    Anxiety, insomnia       Priority: Medium     Created by Conversion  Replacement Utility updated for latest IMO load        Allergic Rhinitis      Priority: Medium     Created by Conversion  Replacement Utility updated for latest IMO load        Dislocation Of The Right Shoulder      Priority: Medium     Created by Conversion  [facility] Annotation: Oct 12 2010  7:38PM - Osmany Moss: Rt.   Shoulder   reduced ([facility]) successfully.  Plans for Ortho consult given   shoulder   laxity.  Replacement Utility updated for latest IMO load        Onychomycosis Of The Toenails      Priority: Medium     Created by Conversion         PSH:  Past Surgical History:   Procedure Laterality Date    INCISION AND DRAINAGE OF WOUND Right  2/26/2020    Procedure: Exploration and Arthrotomy of metacarpophalangeal  joint;  Surgeon: Jany Cote MD;  Location: [facility];  Service: Orthopedics    [facility] LAP,CHOLECYSTECTOMY/EXPLORE      Description: Cholecystectomy Laparoscopic;  Recorded: 12/08/2009;     Current Meds:  Current Outpatient Medications   Medication Sig Dispense Refill    Fluticasone-Umeclidin-Vilant (TRELEGY ELLIPTA) 100-62.5-25 MCG/ACT oral inhaler Inhale 1 puff into the lungs daily. 60 each 11    nicotine (NICODERM CQ) 21 MG/24HR 24 hr patch Place 1 patch onto the skin every 24 hours. 28 patch 4    albuterol (PROAIR HFA/PROVENTIL HFA/VENTOLIN HFA) 108 (90 Base) MCG/ACT inhaler INHALE 1 TO 2 PUFFS BY MOUTH EVERY 4 HOURS AS NEEDED SHORTNESS OF BREATH, WHEEZING, OR COUGH 18 g 0    albuterol (PROVENTIL) (2.5 MG/3ML) 0.083% neb solution USE 1 VIAL IN NEBULIZER EVERY 6 HOURS AS NEEDED FOR SHORTNESS OF BREATH, WHEEZING, OR COUGH 90 mL 1    atorvastatin (LIPITOR) 20 MG tablet Take 1 tablet (20 mg) by mouth daily 90 tablet 3    azelastine (ASTELIN) 0.1 % nasal spray Spray 1 spray into both nostrils 2 times daily. 30 mL 1    dulaglutide (TRULICITY) 1.5 MG/0.5ML pen Inject 1.5 mg subcutaneously every 7 days. 6 mL 1    escitalopram (LEXAPRO) 20 MG tablet Take 1 tablet (20 mg) by mouth daily. 90 tablet 2    glipiZIDE (GLUCOTROL XL) 10 MG 24 hr tablet Take 1 tablet (10 mg) by mouth daily. 90 tablet 1    Glucose Blood (BLOOD GLUCOSE TEST STRIPS) STRP Test blood sugar once daily 100 strip 11    lisinopril (ZESTRIL) 2.5 MG tablet Take 1 tablet (2.5 mg) by mouth daily. 90 tablet 1    LORazepam (ATIVAN) 0.5 MG tablet TAKE 1 TABLET BY MOUTH ONCE DAILY AS NEEDED FOR ANXIETY 15 tablet 0    nicotine (NICODERM CQ) 14 MG/24HR 24 hr patch Place 1 patch onto the skin every 24 hours (Patient not taking: Reported on 5/23/2024) 28 patch 0    nicotine (NICODERM CQ) 7 MG/24HR 24 hr patch Place 1 patch onto the skin every 24 hours 28 patch 0    nicotine  (NICORETTE) 2 MG gum Place 1 each (2 mg) inside cheek every hour as needed for nicotine withdrawal symptoms 100 each 1    nicotine (NICORETTE) 4 MG lozenge Place 1 lozenge (4 mg) inside cheek every hour as needed for nicotine withdrawal symptoms 27 lozenge 3    predniSONE (DELTASONE) 20 MG tablet Take 2 tabs daily x 5 days 10 tablet 0     No current facility-administered medications for this visit.     Allergies:  Allergies   Allergen Reactions    Metformin Diarrhea     XR and IR both cause diarrhea      Social Hx:  Marital status: lives with wife. Daughter and son-in-law   Resides in a hosue, unsure if there is concern for mold.  Occupational history: worked in [email or web address] x 34 years. Currently works part time at [employer].   service: none  Pets: 5 cats  Smoking history: 46 pack year history  Recreational drug use: none, no vape    Family HX: family history includes Diabetes in his sister; Heart Disease in his father; Hypertension in his father; Lung Cancer in his mother.    ROS:   10-point review performed and notable for the above mentioned symptoms. The remainder reviewed and negative.     Physical Exam:  /80   Pulse 82   Wt 93.9 kg (207 lb)   SpO2 94%   BMI 32.42 kg/m      Physical Exam  Constitutional:       General: He is not in acute distress.     Appearance: He is not ill-appearing or diaphoretic.   Cardiovascular:      Rate and Rhythm: Normal rate and regular rhythm.      Heart sounds: Normal heart sounds.   Pulmonary:      Effort: Pulmonary effort is normal. No respiratory distress.      Breath sounds: No wheezing or rhonchi.   Musculoskeletal:      Right lower leg: No edema.      Left lower leg: No edema.   Neurological:      Mental Status: He is alert.   Psychiatric:         Behavior: Behavior normal.       PFT's     5/23/2024      Impression:  Full Pulmonary Function Test is abnormal.  PFTs are consistent with mild obstructive disease. Pt used albuterol and Spiriva 1 hr prior to testing.    There is no hyperinflation.  There is no air-trapping.  Diffusion capacity when corrected for hemoglobin is moderate severely reduced.    Labs:   personally reviewed in the EMR.   Latest Reference Range & Units 04/06/24 19:10   Absolute Eosinophils 0.0 - 0.7 10e3/uL 1.1 (H)     Imaging studies: personally reviewed and interpreted. Below are the Radiology interpretations.    XR CHEST PORT 1 VIEW, DATE: 4/6/2024  INDICATION: Dyspnea  COMPARISON: 11/24/2023                                                                IMPRESSION: Borderline hyperinflation of both lungs. Heart size and pulmonary vascularity within normal limits. No lung infiltrates or pneumothorax. Since 11/24/2023 the slight amount of right pleural fluid/thickening and the platelike atelectasis in the right lung base have resolved.    CT CHEST PULMONARY EMBOLISM W CONTRAST, DATE: 11/26/2023  INDICATION: Persistent high O2 requirements, treated for COPD exacerbation  COMPARISON: None.  FINDINGS:  ANGIOGRAM CHEST: Pulmonary arteries are normal caliber and negative for pulmonary emboli. Thoracic aorta is negative for dissection. No CT evidence of right heart strain.  LUNGS AND PLEURA: Bibasilar atelectasis. No focal consolidation or effusion.  MEDIASTINUM/AXILLAE: Heart is normal in size. No adenopathy.  CORONARY ARTERY CALCIFICATION: Mild.  UPPER ABDOMEN: Normal.  MUSCULOSKELETAL: Degenerative changes of the spine.                                                                 IMPRESSION:  1.  No pulmonary embolism.      Echo 11/2023:  Interpretation Summary  The current study is a limited study for echo contrast bubble study. Please  see full echocardiogram report from November 23, 2023.  Left ventricular systolic function appears normal. There is mild left  ventricular hypertrophy. Left ventricular ejection fraction is grossly normal  estimated at 60 to 65%.  Normal right ventricular size and systolic function.  Echo contrast bubble study is  negative at rest and with Valsalva. No observed  shunt identified.  Mild enlargement of the aortic root.

## 2024-12-10 DIAGNOSIS — J44.1 COPD WITH ACUTE EXACERBATION (H): ICD-10-CM

## 2024-12-10 RX ORDER — ALBUTEROL SULFATE 0.83 MG/ML
SOLUTION RESPIRATORY (INHALATION)
Qty: 90 ML | Refills: 1 | Status: SHIPPED | OUTPATIENT
Start: 2024-12-10

## 2025-01-06 ENCOUNTER — TELEPHONE (OUTPATIENT)
Dept: PULMONOLOGY | Facility: CLINIC | Age: 65
End: 2025-01-06
Payer: COMMERCIAL

## 2025-01-06 DIAGNOSIS — J44.9 CHRONIC OBSTRUCTIVE PULMONARY DISEASE, UNSPECIFIED COPD TYPE (H): Primary | ICD-10-CM

## 2025-01-06 RX ORDER — AZITHROMYCIN 250 MG/1
TABLET, FILM COATED ORAL
Qty: 6 TABLET | Refills: 0 | Status: SHIPPED | OUTPATIENT
Start: 2025-01-06 | End: 2025-01-11

## 2025-01-06 RX ORDER — PREDNISONE 20 MG/1
TABLET ORAL
Qty: 10 TABLET | Refills: 1 | Status: SHIPPED | OUTPATIENT
Start: 2025-01-06

## 2025-01-06 NOTE — TELEPHONE ENCOUNTER
[facility] Call Center    Phone Message    May a detailed message be left on voicemail: yes     Reason for Call: Symptoms or Concerns     Current symptom or concern: Pt called to reports flu-like symptoms including, trouble breathing, vomiting, diarrhea, congestion and cough. He states symptoms started 5 days ago and have been progressively worsening. He notes he is usually prrscribed prednisone and antibiotics for treatment. Please advise and call pt back.    Symptoms have been present for:  5 day(s)    Has patient previously been seen for this? Yes    By : Rica    Are there any new or worsening symptoms? Yes: trouble breathing, cough    Please send rxs to [facility] [address] [place] [address] [address]  P: [phone]  F: [phone]    Action Taken: Other: PULM    Travel Screening: Not Applicable     Date of Service:

## 2025-01-06 NOTE — TELEPHONE ENCOUNTER
Spoke with patient.  Started with stomach flu type symptoms over a week ago.  That is now all better, but started coughing and bringing up large amounts of colored phlegm about a week ago now. States his sats occasionally will go down to 88 % as well.  Usually just wears his oxygen at night, but has been using occasionally during the day as well recently.      Will send prescription for his action plan to get started:  prednisone 40mg daily x 5 days and zpack.  Instructed that if not getting better, he will need to be seen in ED or Urgent Care for evaluation.

## 2025-01-21 NOTE — TELEPHONE ENCOUNTER
"Last Written Prescription Date:  1/18/23  Last Fill Quantity: 2 mL,  # refills: 3   Last office visit provider:   1/18/23    Requested Prescriptions   Pending Prescriptions Disp Refills     TRULICITY 1.5 MG/0.5ML pen [Pharmacy Med Name: Trulicity 1.5 MG/0.5ML Subcutaneous Solution Pen-injector] 4 mL 0     Sig: INJECT 1.5MG SUBCUTANEOUSLY ONCE A WEEK       GLP-1 Agonists Protocol Passed - 5/14/2023  7:06 PM        Passed - HgbA1C in past 3 or 6 months     If HgbA1C is 8 or greater, it needs to be on file within the past 3 months.  If less than 8, must be on file within the past 6 months.     Recent Labs   Lab Test 01/18/23  1127   A1C 6.7*             Passed - Medication is active on med list        Passed - Patient is age 18 or older        Passed - Normal serum creatinine on file in past 12 months     Recent Labs   Lab Test 07/20/22  1505   CR 0.81       Ok to refill medication if creatinine is low          Passed - Recent (6 mo) or future (30 days) visit within the authorizing provider's specialty     Patient had office visit in the last 6 months or has a visit in the next 30 days with authorizing provider.  See \"Patient Info\" tab in inbasket, or \"Choose Columns\" in Meds & Orders section of the refill encounter.            Last Written Prescription Date:  2/6/23  Last Fill Quantity: 90,  # refills: 0   Last office visit provider:   1/18/23         escitalopram (LEXAPRO) 20 MG tablet [Pharmacy Med Name: Escitalopram Oxalate 20 MG Oral Tablet] 90 tablet 0     Sig: Take 1 tablet by mouth once daily       SSRIs Protocol Passed - 5/14/2023  7:06 PM        Passed - Recent (12 mo) or future (30 days) visit within the authorizing provider's specialty     Patient has had an office visit with the authorizing provider or a provider within the authorizing providers department within the previous 12 mos or has a future within next 30 days. See \"Patient Info\" tab in [employer], or \"Choose Columns\" in Meds & Orders section of the " refill encounter.              Passed - Medication is active on med list        Passed - Patient is age 18 or older             Santa Cook RN 05/14/23 7:10 PM   2

## 2025-02-26 ENCOUNTER — TELEPHONE (OUTPATIENT)
Dept: PULMONOLOGY | Facility: CLINIC | Age: 65
End: 2025-02-26
Payer: COMMERCIAL

## 2025-02-26 DIAGNOSIS — B37.0 THRUSH: Primary | ICD-10-CM

## 2025-02-26 RX ORDER — NYSTATIN 100000 [USP'U]/ML
500000 SUSPENSION ORAL 4 TIMES DAILY
Qty: 200 ML | Refills: 0 | Status: SHIPPED | OUTPATIENT
Start: 2025-02-26 | End: 2025-03-08

## 2025-02-26 NOTE — TELEPHONE ENCOUNTER
[facility] Call Center    Phone Message    May a detailed message be left on voicemail: yes     Reason for Call: Other: Mahendra called and informed he has been diagnosed with Thrush since using the Trelegy medication how should he proceed with treating it? Please call asap to assist thank you     Action Taken: Other: Pulm    Travel Screening: Not Applicable

## 2025-02-26 NOTE — TELEPHONE ENCOUNTER
Per Diane Strauss CNP:  Yes, that's fine. Remind him to rinse and gargle really well after use.     Per Diane Strauss CNP verbal order. Will send in Nystatin S & S QID x 10 days.  Spoke with Mahendra. Informed him of new order. Reminded him to rinse and gargle with water really well after use of Trelegy. He informs he has not been doing this.

## 2025-03-03 DIAGNOSIS — E11.65 TYPE 2 DIABETES MELLITUS WITH HYPERGLYCEMIA, WITHOUT LONG-TERM CURRENT USE OF INSULIN (H): ICD-10-CM

## 2025-03-03 RX ORDER — GLIPIZIDE 10 MG/1
10 TABLET, FILM COATED, EXTENDED RELEASE ORAL DAILY
Qty: 90 TABLET | Refills: 0 | Status: SHIPPED | OUTPATIENT
Start: 2025-03-03

## 2025-03-06 DIAGNOSIS — R80.9 TYPE 2 DIABETES MELLITUS WITH MICROALBUMINURIA, WITHOUT LONG-TERM CURRENT USE OF INSULIN (H): ICD-10-CM

## 2025-03-06 DIAGNOSIS — E11.29 TYPE 2 DIABETES MELLITUS WITH MICROALBUMINURIA, WITHOUT LONG-TERM CURRENT USE OF INSULIN (H): ICD-10-CM

## 2025-03-06 RX ORDER — DULAGLUTIDE 1.5 MG/.5ML
INJECTION, SOLUTION SUBCUTANEOUS
Qty: 6 ML | Refills: 0 | Status: SHIPPED | OUTPATIENT
Start: 2025-03-06

## 2025-03-15 ENCOUNTER — HEALTH MAINTENANCE LETTER (OUTPATIENT)
Age: 65
End: 2025-03-15

## 2025-04-05 DIAGNOSIS — E11.29 TYPE 2 DIABETES MELLITUS WITH MICROALBUMINURIA, WITHOUT LONG-TERM CURRENT USE OF INSULIN (H): ICD-10-CM

## 2025-04-05 DIAGNOSIS — R80.9 TYPE 2 DIABETES MELLITUS WITH MICROALBUMINURIA, WITHOUT LONG-TERM CURRENT USE OF INSULIN (H): ICD-10-CM

## 2025-04-05 DIAGNOSIS — E78.5 HYPERLIPIDEMIA, UNSPECIFIED HYPERLIPIDEMIA TYPE: ICD-10-CM

## 2025-04-07 RX ORDER — ATORVASTATIN CALCIUM 20 MG/1
20 TABLET, FILM COATED ORAL DAILY
Qty: 90 TABLET | Refills: 0 | Status: SHIPPED | OUTPATIENT
Start: 2025-04-07

## 2025-04-14 ENCOUNTER — TELEPHONE (OUTPATIENT)
Dept: PULMONOLOGY | Facility: CLINIC | Age: 65
End: 2025-04-14
Payer: COMMERCIAL

## 2025-04-14 DIAGNOSIS — J44.9 CHRONIC OBSTRUCTIVE PULMONARY DISEASE, UNSPECIFIED COPD TYPE (H): ICD-10-CM

## 2025-04-14 RX ORDER — FLUTICASONE FUROATE, UMECLIDINIUM BROMIDE AND VILANTEROL TRIFENATATE 100; 62.5; 25 UG/1; UG/1; UG/1
1 POWDER RESPIRATORY (INHALATION) DAILY
Qty: 60 EACH | Refills: 6 | Status: SHIPPED | OUTPATIENT
Start: 2025-04-14

## 2025-04-14 NOTE — TELEPHONE ENCOUNTER
[facility] Call Center    Phone Message    May a detailed message be left on voicemail: yes     Reason for Call: Medication Refill Request    Has the patient contacted the pharmacy for the refill? Yes   Name of medication being requested: Fluticasone-Umeclidin-Vilant (TRELEGY ELLIPTA) 100-62.5-25 MCG/ACT oral inhaler   Provider who prescribed the medication: Diane Strauss   Pharmacy: [id number] [place] [id number] AT [facility] [address]   [phone]   Date medication is needed: 04/14/25    Action Taken: Message routed to:  Other: pulm    Travel Screening: Not Applicable     Date of Service: 04/14/25

## 2025-04-22 ENCOUNTER — TELEPHONE (OUTPATIENT)
Dept: INTERNAL MEDICINE | Facility: CLINIC | Age: 65
End: 2025-04-22
Payer: COMMERCIAL

## 2025-04-22 NOTE — TELEPHONE ENCOUNTER
Attempted to reach but mailbox is full. Please inform patient that appt on 5.15.25 for physical is the soonest, but we can add him to the wait list.

## 2025-04-22 NOTE — TELEPHONE ENCOUNTER
Reason for Call:  Appointment Request    Patient requesting this type of appt:  Preventive     Requested provider: Rosie Dorado    Reason patient unable to be scheduled: Not within requested timeframe    When does patient want to be seen/preferred time:  Open     Comments: He is oen but would like to get in sooner if he can     Could we send this information to you in [employer] or would you prefer to receive a phone call?:   No preference   Okay to leave a detailed message?: Yes at Cell number on file:    Telephone Information:   Mobile [phone]       Call taken on 4/22/2025 at 10:59 AM by Lorie Schulz

## 2025-05-03 DIAGNOSIS — E11.29 TYPE 2 DIABETES MELLITUS WITH MICROALBUMINURIA, WITHOUT LONG-TERM CURRENT USE OF INSULIN (H): ICD-10-CM

## 2025-05-03 DIAGNOSIS — R80.9 TYPE 2 DIABETES MELLITUS WITH MICROALBUMINURIA, WITHOUT LONG-TERM CURRENT USE OF INSULIN (H): ICD-10-CM

## 2025-05-05 RX ORDER — LISINOPRIL 2.5 MG/1
2.5 TABLET ORAL DAILY
Qty: 90 TABLET | Refills: 0 | Status: SHIPPED | OUTPATIENT
Start: 2025-05-05

## 2025-05-07 ENCOUNTER — TELEPHONE (OUTPATIENT)
Dept: INTERNAL MEDICINE | Facility: CLINIC | Age: 65
End: 2025-05-07
Payer: COMMERCIAL

## 2025-05-12 DIAGNOSIS — E11.29 TYPE 2 DIABETES MELLITUS WITH MICROALBUMINURIA, WITHOUT LONG-TERM CURRENT USE OF INSULIN (H): ICD-10-CM

## 2025-05-12 DIAGNOSIS — R80.9 TYPE 2 DIABETES MELLITUS WITH MICROALBUMINURIA, WITHOUT LONG-TERM CURRENT USE OF INSULIN (H): ICD-10-CM

## 2025-05-12 RX ORDER — DULAGLUTIDE 1.5 MG/.5ML
INJECTION, SOLUTION SUBCUTANEOUS
Qty: 4 ML | Refills: 0 | Status: SHIPPED | OUTPATIENT
Start: 2025-05-12

## 2025-05-15 ENCOUNTER — OFFICE VISIT (OUTPATIENT)
Dept: INTERNAL MEDICINE | Facility: CLINIC | Age: 65
End: 2025-05-15
Payer: COMMERCIAL

## 2025-05-15 VITALS
TEMPERATURE: 98.7 F | HEART RATE: 68 BPM | RESPIRATION RATE: 24 BRPM | DIASTOLIC BLOOD PRESSURE: 70 MMHG | WEIGHT: 198 LBS | OXYGEN SATURATION: 94 % | BODY MASS INDEX: 31.08 KG/M2 | HEIGHT: 67 IN | SYSTOLIC BLOOD PRESSURE: 112 MMHG

## 2025-05-15 DIAGNOSIS — Z12.5 SCREENING FOR PROSTATE CANCER: ICD-10-CM

## 2025-05-15 DIAGNOSIS — Z13.6 SCREENING FOR AAA (ABDOMINAL AORTIC ANEURYSM): ICD-10-CM

## 2025-05-15 DIAGNOSIS — J30.2 SEASONAL ALLERGIC RHINITIS, UNSPECIFIED TRIGGER: ICD-10-CM

## 2025-05-15 DIAGNOSIS — Z51.81 ENCOUNTER FOR THERAPEUTIC DRUG MONITORING: ICD-10-CM

## 2025-05-15 DIAGNOSIS — Z23 NEED FOR VACCINATION: ICD-10-CM

## 2025-05-15 DIAGNOSIS — Z87.891 PERSONAL HISTORY OF TOBACCO USE: ICD-10-CM

## 2025-05-15 DIAGNOSIS — I10 ESSENTIAL HYPERTENSION: ICD-10-CM

## 2025-05-15 DIAGNOSIS — R80.9 TYPE 2 DIABETES MELLITUS WITH MICROALBUMINURIA, WITHOUT LONG-TERM CURRENT USE OF INSULIN (H): ICD-10-CM

## 2025-05-15 DIAGNOSIS — H90.3 BILATERAL SENSORINEURAL HEARING LOSS: ICD-10-CM

## 2025-05-15 DIAGNOSIS — E78.5 HYPERLIPIDEMIA, UNSPECIFIED HYPERLIPIDEMIA TYPE: ICD-10-CM

## 2025-05-15 DIAGNOSIS — F41.1 ANXIETY STATE: ICD-10-CM

## 2025-05-15 DIAGNOSIS — E11.29 TYPE 2 DIABETES MELLITUS WITH MICROALBUMINURIA, WITHOUT LONG-TERM CURRENT USE OF INSULIN (H): ICD-10-CM

## 2025-05-15 DIAGNOSIS — J44.9 COPD MIXED TYPE (H): ICD-10-CM

## 2025-05-15 DIAGNOSIS — E11.65 TYPE 2 DIABETES MELLITUS WITH HYPERGLYCEMIA, WITHOUT LONG-TERM CURRENT USE OF INSULIN (H): ICD-10-CM

## 2025-05-15 DIAGNOSIS — Z00.00 WELCOME TO MEDICARE PREVENTIVE VISIT: Primary | ICD-10-CM

## 2025-05-15 LAB
AMPHETAMINES UR QL SCN: ABNORMAL
BARBITURATES UR QL SCN: ABNORMAL
BASOPHILS # BLD AUTO: 0.1 10E3/UL (ref 0–0.2)
BASOPHILS NFR BLD AUTO: 1 %
BENZODIAZ UR QL SCN: ABNORMAL
BZE UR QL SCN: ABNORMAL
CANNABINOIDS UR QL SCN: ABNORMAL
EOSINOPHIL # BLD AUTO: 0.5 10E3/UL (ref 0–0.7)
EOSINOPHIL NFR BLD AUTO: 7 %
ERYTHROCYTE [DISTWIDTH] IN BLOOD BY AUTOMATED COUNT: 12.9 % (ref 10–15)
EST. AVERAGE GLUCOSE BLD GHB EST-MCNC: 146 MG/DL
FENTANYL UR QL: ABNORMAL
HBA1C MFR BLD: 6.7 % (ref 0–5.6)
HCT VFR BLD AUTO: 43.6 % (ref 40–53)
HGB BLD-MCNC: 14.6 G/DL (ref 13.3–17.7)
IMM GRANULOCYTES # BLD: 0 10E3/UL
IMM GRANULOCYTES NFR BLD: 0 %
LYMPHOCYTES # BLD AUTO: 1.3 10E3/UL (ref 0.8–5.3)
LYMPHOCYTES NFR BLD AUTO: 16 %
MCH RBC QN AUTO: 31.7 PG (ref 26.5–33)
MCHC RBC AUTO-ENTMCNC: 33.5 G/DL (ref 31.5–36.5)
MCV RBC AUTO: 95 FL (ref 78–100)
MONOCYTES # BLD AUTO: 0.7 10E3/UL (ref 0–1.3)
MONOCYTES NFR BLD AUTO: 9 %
NEUTROPHILS # BLD AUTO: 5.2 10E3/UL (ref 1.6–8.3)
NEUTROPHILS NFR BLD AUTO: 67 %
OPIATES UR QL SCN: ABNORMAL
PCP QUAL URINE (ROCHE): ABNORMAL
PLATELET # BLD AUTO: 142 10E3/UL (ref 150–450)
RBC # BLD AUTO: 4.6 10E6/UL (ref 4.4–5.9)
WBC # BLD AUTO: 7.7 10E3/UL (ref 4–11)

## 2025-05-15 PROCEDURE — 3074F SYST BP LT 130 MM HG: CPT | Performed by: NURSE PRACTITIONER

## 2025-05-15 PROCEDURE — 82785 ASSAY OF IGE: CPT | Performed by: NURSE PRACTITIONER

## 2025-05-15 PROCEDURE — G0296 VISIT TO DETERM LDCT ELIG: HCPCS | Performed by: NURSE PRACTITIONER

## 2025-05-15 PROCEDURE — 80307 DRUG TEST PRSMV CHEM ANLYZR: CPT | Performed by: NURSE PRACTITIONER

## 2025-05-15 PROCEDURE — 36415 COLL VENOUS BLD VENIPUNCTURE: CPT | Performed by: NURSE PRACTITIONER

## 2025-05-15 PROCEDURE — 1126F AMNT PAIN NOTED NONE PRSNT: CPT | Performed by: NURSE PRACTITIONER

## 2025-05-15 PROCEDURE — 83036 HEMOGLOBIN GLYCOSYLATED A1C: CPT | Performed by: NURSE PRACTITIONER

## 2025-05-15 PROCEDURE — 99214 OFFICE O/P EST MOD 30 MIN: CPT | Mod: 25 | Performed by: NURSE PRACTITIONER

## 2025-05-15 PROCEDURE — 80048 BASIC METABOLIC PNL TOTAL CA: CPT | Performed by: NURSE PRACTITIONER

## 2025-05-15 PROCEDURE — 3078F DIAST BP <80 MM HG: CPT | Performed by: NURSE PRACTITIONER

## 2025-05-15 PROCEDURE — 85025 COMPLETE CBC W/AUTO DIFF WBC: CPT | Performed by: NURSE PRACTITIONER

## 2025-05-15 PROCEDURE — 86003 ALLG SPEC IGE CRUDE XTRC EA: CPT | Performed by: NURSE PRACTITIONER

## 2025-05-15 PROCEDURE — G0402 INITIAL PREVENTIVE EXAM: HCPCS | Performed by: NURSE PRACTITIONER

## 2025-05-15 PROCEDURE — G0103 PSA SCREENING: HCPCS | Performed by: NURSE PRACTITIONER

## 2025-05-15 PROCEDURE — G2211 COMPLEX E/M VISIT ADD ON: HCPCS | Performed by: NURSE PRACTITIONER

## 2025-05-15 RX ORDER — LISINOPRIL 2.5 MG/1
2.5 TABLET ORAL DAILY
Qty: 90 TABLET | Refills: 1 | Status: SHIPPED | OUTPATIENT
Start: 2025-05-15

## 2025-05-15 RX ORDER — LORAZEPAM 0.5 MG/1
0.5 TABLET ORAL DAILY PRN
Qty: 15 TABLET | Refills: 0 | Status: SHIPPED | OUTPATIENT
Start: 2025-05-15

## 2025-05-15 RX ORDER — GLIPIZIDE 10 MG/1
10 TABLET, FILM COATED, EXTENDED RELEASE ORAL DAILY
Qty: 90 TABLET | Refills: 1 | Status: SHIPPED | OUTPATIENT
Start: 2025-05-15

## 2025-05-15 RX ORDER — ATORVASTATIN CALCIUM 20 MG/1
20 TABLET, FILM COATED ORAL DAILY
Qty: 90 TABLET | Refills: 0 | Status: SHIPPED | OUTPATIENT
Start: 2025-05-15

## 2025-05-15 SDOH — HEALTH STABILITY: PHYSICAL HEALTH: ON AVERAGE, HOW MANY DAYS PER WEEK DO YOU ENGAGE IN MODERATE TO STRENUOUS EXERCISE (LIKE A BRISK WALK)?: 1 DAY

## 2025-05-15 ASSESSMENT — PATIENT HEALTH QUESTIONNAIRE - PHQ9
10. IF YOU CHECKED OFF ANY PROBLEMS, HOW DIFFICULT HAVE THESE PROBLEMS MADE IT FOR YOU TO DO YOUR WORK, TAKE CARE OF THINGS AT HOME, OR GET ALONG WITH OTHER PEOPLE: SOMEWHAT DIFFICULT
SUM OF ALL RESPONSES TO PHQ QUESTIONS 1-9: 4
SUM OF ALL RESPONSES TO PHQ QUESTIONS 1-9: 4

## 2025-05-15 ASSESSMENT — ANXIETY QUESTIONNAIRES
1. FEELING NERVOUS, ANXIOUS, OR ON EDGE: SEVERAL DAYS
7. FEELING AFRAID AS IF SOMETHING AWFUL MIGHT HAPPEN: NOT AT ALL
GAD7 TOTAL SCORE: 5
2. NOT BEING ABLE TO STOP OR CONTROL WORRYING: SEVERAL DAYS
GAD7 TOTAL SCORE: 5
3. WORRYING TOO MUCH ABOUT DIFFERENT THINGS: SEVERAL DAYS
IF YOU CHECKED OFF ANY PROBLEMS ON THIS QUESTIONNAIRE, HOW DIFFICULT HAVE THESE PROBLEMS MADE IT FOR YOU TO DO YOUR WORK, TAKE CARE OF THINGS AT HOME, OR GET ALONG WITH OTHER PEOPLE: SOMEWHAT DIFFICULT
7. FEELING AFRAID AS IF SOMETHING AWFUL MIGHT HAPPEN: NOT AT ALL
4. TROUBLE RELAXING: SEVERAL DAYS
GAD7 TOTAL SCORE: 5
8. IF YOU CHECKED OFF ANY PROBLEMS, HOW DIFFICULT HAVE THESE MADE IT FOR YOU TO DO YOUR WORK, TAKE CARE OF THINGS AT HOME, OR GET ALONG WITH OTHER PEOPLE?: SOMEWHAT DIFFICULT
5. BEING SO RESTLESS THAT IT IS HARD TO SIT STILL: NOT AT ALL
6. BECOMING EASILY ANNOYED OR IRRITABLE: SEVERAL DAYS

## 2025-05-15 ASSESSMENT — SOCIAL DETERMINANTS OF HEALTH (SDOH): HOW OFTEN DO YOU GET TOGETHER WITH FRIENDS OR RELATIVES?: ONCE A WEEK

## 2025-05-15 ASSESSMENT — PAIN SCALES - GENERAL: PAINLEVEL_OUTOF10: NO PAIN (0)

## 2025-05-15 NOTE — PROGRESS NOTES
Internal Medicine Annual Wellness Visit  [address] [address]  Phone: [phone]  Fax: [phone]            Patient Name: Remi Mathias  Patient Age: 65 year old  YOB: 1960  MRN: [id number]    Date of Visit: 5/15/2025  Primary Provider: Rosie Dorado    Patient Care Team:  Rosie Dorado NP as PCP - General  Rosie Dorado NP as Assigned PCP  Diane Strauss APRN CNP as Assigned Pulmonology Provider     Subjective   Patient presents with:  Physical         5/15/2025     1:22 PM   Additional Questions   Roomed by [facility]   Accompanied by Self     HPI:  ***      Patient Active Problem List   Diagnosis    Anxiety, insomnia     Allergic Rhinitis    Onychomycosis Of The Toenails    Nicotine Dependence    Hyperlipidemia    Type 2 diabetes mellitus with microalbuminuria, without long-term current use of insulin (H)    Dislocation Of The Right Shoulder    Controlled substance agreement signed, lorazepam 5/2021    Essential hypertension    COPD mixed type (H)     Current Scheduled Meds:  Current Outpatient Medications   Medication Sig Dispense Refill    albuterol (PROAIR HFA/PROVENTIL HFA/VENTOLIN HFA) 108 (90 Base) MCG/ACT inhaler INHALE 1 TO 2 PUFFS BY MOUTH EVERY 4 HOURS AS NEEDED SHORTNESS OF BREATH, WHEEZING, OR COUGH 18 g 0    albuterol (PROVENTIL) (2.5 MG/3ML) 0.083% neb solution USE 1 VIAL IN NEBULIZER EVERY 6 HOURS AS NEEDED FOR SHORTNESS OF BREATH, WHEEZING, OR COUGH 90 mL 1    atorvastatin (LIPITOR) 20 MG tablet Take 1 tablet by mouth once daily 90 tablet 0    azelastine (ASTELIN) 0.1 % nasal spray Spray 1 spray into both nostrils 2 times daily. 30 mL 1    dulaglutide (TRULICITY) 1.5 MG/0.5ML pen INJECT 1.5MG SUBCUTANEOUSLY EVERY 7 DAYS 4 mL 0    escitalopram (LEXAPRO) 20 MG tablet Take 1 tablet (20 mg) by mouth daily. 90 tablet 2    Fluticasone-Umeclidin-Vilant (TRELEGY ELLIPTA) 100-62.5-25 MCG/ACT oral inhaler Inhale  "1 puff into the lungs daily. 60 each 6    glipiZIDE (GLUCOTROL XL) 10 MG 24 hr tablet Take 1 tablet by mouth once daily 90 tablet 0    Glucose Blood (BLOOD GLUCOSE TEST STRIPS) STRP Test blood sugar once daily 100 strip 11    lisinopril (ZESTRIL) 2.5 MG tablet Take 1 tablet by mouth once daily 90 tablet 0    LORazepam (ATIVAN) 0.5 MG tablet TAKE 1 TABLET BY MOUTH ONCE DAILY AS NEEDED FOR ANXIETY 15 tablet 0    nicotine (NICODERM CQ) 21 MG/24HR 24 hr patch Place 1 patch onto the skin every 24 hours. 28 patch 4    nicotine (NICODERM CQ) 7 MG/24HR 24 hr patch Place 1 patch onto the skin every 24 hours 28 patch 0    nicotine (NICORETTE) 2 MG gum Place 1 each (2 mg) inside cheek every hour as needed for nicotine withdrawal symptoms 100 each 1    nicotine (NICORETTE) 4 MG lozenge Place 1 lozenge (4 mg) inside cheek every hour as needed for nicotine withdrawal symptoms 27 lozenge 3    predniSONE (DELTASONE) 20 MG tablet Take 2 tabs daily x 5 days 10 tablet 1    predniSONE (DELTASONE) 20 MG tablet Take 2 tabs daily x 5 days 10 tablet 0    nicotine (NICODERM CQ) 14 MG/24HR 24 hr patch Place 1 patch onto the skin every 24 hours (Patient not taking: Reported on 5/15/2025) 28 patch 0         Objective   Physical Examination:  Vitals:    05/15/25 1323   BP: 112/70   Pulse: 68   Resp: 24   Temp: 98.7  F (37.1  C)   TempSrc: Oral   SpO2: 94%   Weight: 89.8 kg (198 lb)   Height: 1.69 m (5' 6.54\")     Wt Readings from Last 5 Encounters:   05/15/25 89.8 kg (198 lb)   11/13/24 93.9 kg (207 lb)   09/11/24 94.8 kg (209 lb 1.6 oz)   05/23/24 93 kg (205 lb)   04/22/24 93 kg (205 lb)     Body mass index is 31.45 kg/m .   ***  Constitutional: In no apparent distress  Eyes: Conjunctivae clear. Non-icteric.   ENT: Tympanic membrane clear with landmarks well visualized bilaterally. Septum midline, nares patent, no visible polyps, mucosa moist and without drainage. Lips and mucosa moist. Pharynx without erythema or exudate. Neck is supple, " trachea midline. No cervical adenopathy  Respiratory: Clear to auscultation bilaterally. Normal inspiratory and expiratory effort  Cardiovascular: Regular rate and rhythm. No murmurs, rubs, or gallops. No edema. No carotid bruits.   Gastrointestinal: Bowel sounds active all four quadrants. Soft, non-tender.   Skin: Warm and dry. Without suspicious looking lesions  Neuro: Normal gait  Psych: Alert and oriented x3.   Diabetic foot exam: normal DP and PT pulses, no trophic changes or ulcerative lesions, and normal monofilament exam        Diagnoses managed today:  1. Type 2 diabetes mellitus with microalbuminuria, without long-term current use of insulin (H)    2. Need for vaccination    3. Essential hypertension    4. Screening for AAA (abdominal aortic aneurysm)         Assessment & Plan   ***      I am having Mahendra Mathias maintain his BLOOD GLUCOSE TEST STRIPS, nicotine, nicotine, nicotine, nicotine, predniSONE, albuterol, LORazepam, escitalopram, azelastine, nicotine, albuterol, predniSONE, glipiZIDE, atorvastatin, Trelegy Ellipta, lisinopril, and Trulicity.        No orders of the defined types were placed in this encounter.      Follow up: No follow-ups on file. Sooner if needed.    Additional required elements:  I have reviewed opioid use disorder and substance use disorder risk factors and made any needed referrals.  This may not apply to this individual patient, but this documentation is required by Medicare.    Counseling   Appropriate preventive services were addressed with this patient via screening, questionnaire, or discussion as appropriate for fall prevention, nutrition, physical activity, Tobacco-use cessation, social engagement, weight loss and cognition.  Checklist reviewing preventive services available has been given to the patient.    Memory screen:      5/15/2025     1:27 PM   MINI COG   Clock Draw Score 2 Normal   3 Item Recall 3 objects recalled   Mini Cog Total Score 5        PHQ-2 Score:        5/15/2025     1:27 PM 9/11/2024     9:41 AM   PHQ-2 ( 1999 [employer])   Q1: Little interest or pleasure in doing things 1 1   Q2: Feeling down, depressed or hopeless 0 0   PHQ-2 Score 1  1   Q1: Little interest or pleasure in doing things Several days Several days   Q2: Feeling down, depressed or hopeless Not at all Not at all   PHQ-2 Score 1 1       Patient-reported      Advanced care planning reviewed.        5/15/2025   Fall Risk   Fallen 2 or more times in the past year? No   Trouble with walking or balance? No         Health Maintenance   Topic Date Due    ADVANCE CARE PLANNING  Never done    COPD ACTION PLAN  Never done    RSV VACCINE (1 - Risk 60-74 years 1-dose series) Never done    ANNUAL REVIEW OF HM ORDERS  12/09/2022    MEDICARE ANNUAL WELLNESS VISIT  Never done    AORTIC ANEURYSM SCREENING (SYSTEM ASSIGNED)  Never done    DIABETIC FOOT EXAM  07/12/2024    COVID-19 Vaccine (3 - 2024-25 season) 09/01/2024    LUNG CANCER SCREENING  11/26/2024    A1C  03/11/2025    BMP  04/09/2025    EYE EXAM  08/18/2025    NICOTINE/TOBACCO CESSATION COUNSELING Q 1 YR  09/11/2025    LIPID  09/11/2025    MICROALBUMIN  09/11/2025    COLORECTAL CANCER SCREENING  03/08/2026    FALL RISK ASSESSMENT  05/15/2026    DTAP/TDAP/TD IMMUNIZATION (3 - Td or Tdap) 10/05/2030    SPIROMETRY  Completed    PHQ-2 (once per calendar year)  Completed    INFLUENZA VACCINE  Completed    Pneumococcal Vaccine: 50+ Years  Completed    ZOSTER IMMUNIZATION  Completed    HPV IMMUNIZATION  Aged Out    MENINGITIS IMMUNIZATION  Aged Out    HEPATITIS C SCREENING  Discontinued      Last vision screening (if done):      5/15/2025     1:29 PM   Vision Screening Results   Does the patient have corrective lenses (glasses/contacts)? Yes   Patient wears corrective lenses (select all that apply) Worn during vision screen   Vision Acuity Tool ANANDA   RIGHT EYE 10/16 (20/32)   LEFT EYE 10/10 (20/20)   Is there a two line difference? No   Vision Screen Results Pass           The longitudinal plan of care for the diagnosis(es)/condition(s) as documented were addressed during this visit. Due to the added complexity in care, I will continue to support Mahendra in the subsequent management and with ongoing continuity of care.    Rosie Dorado, DNP, APRN, CNP   Electronically signed     Lung Cancer Screening Shared Decision Making Visit     Remi Mathias, a 65 year old male, is eligible for lung cancer screening    History   Smoking Status    Every Day    Types: Cigarettes   Smokeless Tobacco    Never   56}    I have discussed with patient the risks and benefits of screening for lung cancer with low-dose CT.     The risks include:    radiation exposure: one low dose chest CT has as much ionizing radiation as about 15 chest x-rays, or 6 months of background radiation living in [place]      false positives: most findings/nodules are NOT cancer, but some might still require additional diagnostic evaluation, including biopsy    over-diagnosis: some slow growing cancers that might never have been clinically significant will be detected and treated unnecessarily     The benefit of early detection of lung cancer is contingent upon adherence to annual screening or more frequent follow up if indicated.     Furthermore, to benefit from screening, Remi must be willing and able to undergo diagnostic procedures, if indicated. Although no specific guide is available for determining severity of comorbidities, it is reasonable to withhold screening in patients who have greater mortality risk from other diseases.     We did discuss that the best way to prevent lung cancer is to not smoke.   have corrective lenses (glasses/contacts)? Yes   Patient wears corrective lenses (select all that apply) Worn during vision screen   Vision Acuity Tool ANANDA   RIGHT EYE 10/16 (20/32)   LEFT EYE 10/10 (20/20)   Is there a two line difference? No   Vision Screen Results Pass     Lung Cancer Screening Shared Decision Making Visit     Remi Mathias, a 65 year old male, is eligible for lung cancer screening    History   Smoking Status    Every Day    Types: Cigarettes   Smokeless Tobacco    Never   56}    I have discussed with patient the risks and benefits of screening for lung cancer with low-dose CT.     The risks include:    radiation exposure: one low dose chest CT has as much ionizing radiation as about 15 chest x-rays, or 6 months of background radiation living in [place]      false positives: most findings/nodules are NOT cancer, but some might still require additional diagnostic evaluation, including biopsy    over-diagnosis: some slow growing cancers that might never have been clinically significant will be detected and treated unnecessarily     The benefit of early detection of lung cancer is contingent upon adherence to annual screening or more frequent follow up if indicated.     Furthermore, to benefit from screening, Remi must be willing and able to undergo diagnostic procedures, if indicated. Although no specific guide is available for determining severity of comorbidities, it is reasonable to withhold screening in patients who have greater mortality risk from other diseases.     We did discuss that the best way to prevent lung cancer is to not smoke.    The longitudinal plan of care for the diagnosis(es)/condition(s) as documented were addressed during this visit. Due to the added complexity in care, I will continue to support Mahendra in the subsequent management and with ongoing continuity of care.    Rosie Dorado, DNP, APRN, CNP   Electronically signed

## 2025-05-15 NOTE — PATIENT INSTRUCTIONS
Patient Education   Preventive Care Advice   This is general advice given by our system to help you stay healthy. However, your care team may have specific advice just for you. Please talk to your care team about your preventive care needs.  Nutrition  Eat 5 or more servings of fruits and vegetables each day.  Try wheat bread, brown rice and whole grain pasta (instead of white bread, rice, and pasta).  Get enough calcium and vitamin D. Check the label on foods and aim for 100% of the RDA (recommended daily allowance).  Lifestyle  Exercise at least 150 minutes each week  (30 minutes a day, 5 days a week).  Do muscle strengthening activities 2 days a week. These help control your weight and prevent disease.  No smoking.  Wear sunscreen to prevent skin cancer.  Have a dental exam and cleaning every 6 months.  Yearly exams  See your health care team every year to talk about:  Any changes in your health.  Any medicines your care team has prescribed.  Preventive care, family planning, and ways to prevent chronic diseases.  Shots (vaccines)   HPV shots (up to age 26), if you've never had them before.  Hepatitis B shots (up to age 59), if you've never had them before.  COVID-19 shot: Get this shot when it's due.  Flu shot: Get a flu shot every year.  Tetanus shot: Get a tetanus shot every 10 years.  Pneumococcal, hepatitis A, and RSV shots: Ask your care team if you need these based on your risk.  Shingles shot (for age 50 and up)  General health tests  Diabetes screening:  Starting at age 35, Get screened for diabetes at least every 3 years.  If you are younger than age 35, ask your care team if you should be screened for diabetes.  Cholesterol test: At age 39, start having a cholesterol test every 5 years, or more often if advised.  Bone density scan (DEXA): At age 50, ask your care team if you should have this scan for osteoporosis (brittle bones).  Hepatitis C: Get tested at least once in your life.  STIs (sexually  transmitted infections)  Before age 24: Ask your care team if you should be screened for STIs.  After age 24: Get screened for STIs if you're at risk. You are at risk for STIs (including HIV) if:  You are sexually active with more than one person.  You don't use condoms every time.  You or a partner was diagnosed with a sexually transmitted infection.  If you are at risk for HIV, ask about PrEP medicine to prevent HIV.  Get tested for HIV at least once in your life, whether you are at risk for HIV or not.  Cancer screening tests  Cervical cancer screening: If you have a cervix, begin getting regular cervical cancer screening tests starting at age 21.  Breast cancer scan (mammogram): If you've ever had breasts, begin having regular mammograms starting at age 40. This is a scan to check for breast cancer.  Colon cancer screening: It is important to start screening for colon cancer at age 45.  Have a colonoscopy test every 10 years (or more often if you're at risk) Or, ask your provider about stool tests like a FIT test every year or Cologuard test every 3 years.  To learn more about your testing options, visit:   .  For help making a decision, visit:   https://bit.ly/oh59435.  Prostate cancer screening test: If you have a prostate, ask your care team if a prostate cancer screening test (PSA) at age 55 is right for you.  Lung cancer screening: If you are a current or former smoker ages 50 to 80, ask your care team if ongoing lung cancer screenings are right for you.  For informational purposes only. Not to replace the advice of your health care provider. Copyright   2023 [facility]. All rights reserved. Clinically reviewed by the [facility] Transitions Program. "[employer] 488546 - REV 01/24.  Hearing Loss: Care Instructions  Overview     Hearing loss is a sudden or slow decrease in how well you hear. It can range from slight to profound. Permanent hearing loss can occur with aging. It also can  happen when you are exposed long-term to loud noise. Examples include listening to loud music, riding motorcycles, or being around other loud machines.  Hearing loss can affect your work and home life. It can make you feel lonely or depressed. You may feel that you have lost your independence. But hearing aids and other devices can help you hear better and feel connected to others.  Follow-up care is a key part of your treatment and safety. Be sure to make and go to all appointments, and call your doctor if you are having problems. It's also a good idea to know your test results and keep a list of the medicines you take.  How can you care for yourself at home?  Avoid loud noises whenever possible. This helps keep your hearing from getting worse.  Always wear hearing protection around loud noises.  Wear a hearing aid as directed.  A professional can help you pick a hearing aid that will work best for you.  You can also get hearing aids over the counter for mild to moderate hearing loss.  Have hearing tests as your doctor suggests. They can show whether your hearing has changed. Your hearing aid may need to be adjusted.  Use other devices as needed. These may include:  Telephone amplifiers and hearing aids that can connect to a television, stereo, radio, or microphone.  Devices that use lights or vibrations. These alert you to the doorbell, a ringing telephone, or a baby monitor.  Television closed-captioning. This shows the words at the bottom of the screen. Most new TVs can do this.  TTY (text telephone). This lets you type messages back and forth on the telephone instead of talking or listening. These devices are also called TDD. When messages are typed on the keyboard, they are sent over the phone line to a receiving TTY. The message is shown on a monitor.  Use text messaging, social media, and email if it is hard for you to communicate by telephone.  Try to learn a listening technique called speechreading. It is  "not lipreading. You pay attention to people's gestures, expressions, posture, and tone of voice. These clues can help you understand what a person is saying. Face the person you are talking to, and have them face you. Make sure the lighting is good. You need to see the other person's face clearly.  Think about counseling if you need help to adjust to your hearing loss.  When should you call for help?  Watch closely for changes in your health, and be sure to contact your doctor if:    You think your hearing is getting worse.     You have new symptoms, such as dizziness or nausea.   Where can you learn more?  Go to https://www.[employer]/patiented  Enter R798 in the search box to learn more about \"Hearing Loss: Care Instructions.\"  Current as of: October 27, 2024  Content Version: 14.4    2178-3486 [employer].   Care instructions adapted under license by your healthcare professional. If you have questions about a medical condition or this instruction, always ask your healthcare professional. [employer] disclaims any warranty or liability for your use of this information.    Learning About Stress  What is stress?     Stress is your body's response to a hard situation. Your body can have a physical, emotional, or mental response. Stress is a fact of life for most people, and it affects everyone differently. What causes stress for you may not be stressful for someone else.  A lot of things can cause stress. You may feel stress when you go on a job interview, take a test, or run a race. This kind of short-term stress is normal and even useful. It can help you if you need to work hard or react quickly. For example, stress can help you finish an important job on time.  Long-term stress is caused by ongoing stressful situations or events. Examples of long-term stress include long-term health problems, ongoing problems at work, or conflicts in your family. Long-term stress can harm your " health.  How does stress affect your health?  When you are stressed, your body responds as though you are in danger. It makes hormones that speed up your heart, make you breathe faster, and give you a burst of energy. This is called the fight-or-flight stress response. If the stress is over quickly, your body goes back to normal and no harm is done.  But if stress happens too often or lasts too long, it can have bad effects. Long-term stress can make you more likely to get sick, and it can make symptoms of some diseases worse. If you tense up when you are stressed, you may develop neck, shoulder, or low back pain. Stress is linked to high blood pressure and heart disease.  Stress also harms your emotional health. It can make you mann, tense, or depressed. Your relationships may suffer, and you may not do well at work or school.  What can you do to manage stress?  You can try these things to help manage stress:   Do something active. Exercise or activity can help reduce stress. Walking is a great way to get started. Even everyday activities such as housecleaning or yard work can help.  Try yoga or niranjan chi. These techniques combine exercise and meditation. You may need some training at first to learn them.  Do something you enjoy. For example, listen to music or go to a movie. Practice your hobby or do volunteer work.  Meditate. This can help you relax, because you are not worrying about what happened before or what may happen in the future.  Do guided imagery. Imagine yourself in any setting that helps you feel calm. You can use online videos, books, or a teacher to guide you.  Do breathing exercises. For example:  From a standing position, bend forward from the waist with your knees slightly bent. Let your arms dangle close to the floor.  Breathe in slowly and deeply as you return to a standing position. Roll up slowly and lift your head last.  Hold your breath for just a few seconds in the standing  "position.  Breathe out slowly and bend forward from the waist.  Let your feelings out. Talk, laugh, cry, and express anger when you need to. Talking with supportive friends or family, a counselor, or a mandi leader about your feelings is a healthy way to relieve stress. Avoid discussing your feelings with people who make you feel worse.  Write. It may help to write about things that are bothering you. This helps you find out how much stress you feel and what is causing it. When you know this, you can find better ways to cope.  What can you do to prevent stress?  You might try some of these things to help prevent stress:  Manage your time. This helps you find time to do the things you want and need to do.  Get enough sleep. Your body recovers from the stresses of the day while you are sleeping.  Get support. Your family, friends, and community can make a difference in how you experience stress.  Limit your news feed. Avoid or limit time on social media or news that may make you feel stressed.  Do something active. Exercise or activity can help reduce stress. Walking is a great way to get started.  Where can you learn more?  Go to https://www.[employer].net/patiented  Enter N032 in the search box to learn more about \"Learning About Stress.\"  Current as of: October 24, 2024  Content Version: 14.4    [id number] [employer].   Care instructions adapted under license by your healthcare professional. If you have questions about a medical condition or this instruction, always ask your healthcare professional. [employer] disclaims any warranty or liability for your use of this information.       Lung Cancer Screening   Frequently Asked Questions  If you are at high-risk for lung cancer, getting screened with low-dose computed tomography (LDCT) every year can help save your life. This handout offers answers to some of the most common questions about lung cancer screening. If you have other questions, " please call [phone] ([phone]).     What is it?  Lung cancer screening uses special X-ray technology to create an image of your lung tissue. The exam is quick and easy and takes less than 10 seconds. We don t give you any medicine or use any needles. You can eat before and after the exam. You don t need to change your clothes as long as the clothing on your chest doesn t contain metal. But, you do need to be able to hold your breath for at least 6 seconds during the exam.    What is the goal of lung cancer screening?  The goal of lung cancer screening is to save lives. Many times, lung cancer is not found until a person starts having physical symptoms. Lung cancer screening can help detect lung cancer in the earliest stages when it may be easier to treat.    Who should be screened for lung cancer?  We suggest lung cancer screening for anyone who is at high-risk for lung cancer. You are in the high-risk group if you:      are between the ages of 55 and 79, and    have smoked at least 1 pack of cigarettes a day for 20 or more years, and    still smoke or have quit within the past 15 years.    However, if you have a new cough or shortness of breath, you should talk to your doctor before being screened.    Why does it matter if I have symptoms?  Certain symptoms can be a sign that you have a condition in your lungs that should be checked and treated by your doctor. These symptoms include fever, chest pain, a new or changing cough, shortness of breath that you have never felt before, coughing up blood or unexplained weight loss. Having any of these symptoms can greatly affect the results of lung cancer screening.       Should all smokers get an LDCT lung cancer screening exam?  It depends. Lung cancer screening is for a very specific group of men and women who have a history of heavy smoking over a long period of time (see  Who should be screened for lung cancer  above).  I am in the high-risk group, but  have been diagnosed with cancer in the past. Is LDCT lung cancer screening right for me?  In some cases, you should not have LDCT lung screening, such as when your doctor is already following your cancer with CT scan studies. Your doctor will help you decide if LDCT lung screening is right for you.  Do I need to have a screening exam every year?  Yes. If you are in the high-risk group described earlier, you should get an LDCT lung cancer screening exam every year until you are 79, or are no longer willing or able to undergo screening and possible procedures to diagnose and treat lung cancer.  How effective is LDCT at preventing death from lung cancer?  Studies have shown that LDCT lung cancer screening can lower the risk of death from lung cancer by 20 percent in people who are at high-risk.  What are the risks?  There are some risks and limitations of LDCT lung cancer screening. We want to make sure you understand the risks and benefits, so please let us know if you have any questions. Your doctor may want to talk with you more about these risks.    Radiation exposure: As with any exam that uses radiation, there is a very small increased risk of cancer. The amount of radiation in LDCT is small--about the same amount a person would get from a mammogram. Your doctor orders the exam when he or she feels the potential benefits outweigh the risks.    False negatives: No test is perfect, including LDCT. It is possible that you may have a medical condition, including lung cancer, that is not found during your exam. This is called a false negative result.    False positives and more testing: LDCT very often finds something in the lung that could be cancer, but in fact is not. This is called a false positive result. False positive tests often cause anxiety. To make sure these findings are not cancer, you may need to have more tests. These tests will be done only if you give us permission. Sometimes patients need a treatment  that can have side effects, such as a biopsy. For more information on false positives, see  What can I expect from the results?     Findings not related to lung cancer: Your LDCT exam also takes pictures of areas of your body next to your lungs. In a very small number of cases, the CT scan will show an abnormal finding in one of these areas, such as your kidneys, adrenal glands, liver or thyroid. This finding may not be serious, but you may need more tests. Your doctor can help you decide what other tests you may need, if any.  What can I expect from the results?  About 1 out of 4 LDCT exams will find something that may need more tests. Most of the time, these findings are lung nodules. Lung nodules are very small collections of tissue in the lung. These nodules are very common, and the vast majority--more than 97 percent--are not cancer (benign). Most are normal lymph nodes or small areas of scarring from past infections.  But, if a small lung nodule is found to be cancer, the cancer can be cured more than 90 percent of the time. To know if the nodule is cancer, we may need to get more images before your next yearly screening exam. If the nodule has suspicious features (for example, it is large, has an odd shape or grows over time), we will refer you to a specialist for further testing.  Will my doctor also get the results?  Yes. Your doctor will get a copy of your results.  Is it okay to keep smoking now that there s a cancer screening exam?  No. Tobacco is one of the strongest cancer-causing agents. It causes not only lung cancer, but other cancers and cardiovascular (heart) diseases as well. The damage caused by smoking builds over time. This means that the longer you smoke, the higher your risk of disease. While it is never too late to quit, the sooner you quit, the better.  Where can I find help to quit smoking?  The best way to prevent lung cancer is to stop smoking. If you have already quit smoking,  congratulations and keep it up! For help on quitting smoking, please call [facility] at 7-544-QUIT-NOW (1-373.211.5919) or the American [employer] Society at 1-912.314.7096 to find local resources near you.  One-on-one health coaching:  If you d prefer to work individually with a health care provider on tobacco cessation, we offer:      Medication Therapy Management:  Our specially trained pharmacists work closely with you and your doctor to help you quit smoking.  Call [phone] or [phone] (toll free).

## 2025-05-15 NOTE — PROGRESS NOTES
Preventive Care Visit  [facility]  Rosie Dorado NP, Internal Medicine  May 15, 2025  {Provider  Link to SmartSet :452508}    {PROVIDER CHARTING PREFERENCE:207362}    Kaleb Mccray is a 65 year old, presenting for the following:  Physical        5/15/2025     1:22 PM   Additional Questions   Roomed by  Hiren Paw   Accompanied by Self          HPI  ***   {MA/LPN/RN Pre-Provider Visit Orders- hCG/UA/Strep (Optional):286329}  {SUPERLIST (Optional):336734}  {additonal problems for provider to add (Optional):831109}  Advance Care Planning  {The storyboard will display whether the patient has ACP docs on file. Hover over the Code section in the storyboard to access the ACP documents. :525722}  Discussed advance care planning with patient; informed AVS has link to [facility].        5/15/2025   General Health   How would you rate your overall physical health? (!) FAIR   Feel stress (tense, anxious, or unable to sleep) To some extent   (!) STRESS CONCERN      5/15/2025   Nutrition   Diet: Low fat/cholesterol    Diabetic    Carbohydrate counting       Multiple values from one day are sorted in reverse-chronological order         5/15/2025   Exercise   Days per week of moderate/strenous exercise 1 day   (!) EXERCISE CONCERN      5/15/2025   Social Factors   Frequency of gathering with friends or relatives Once a week   Worry food won't last until get money to buy more Patient declined   Food not last or not have enough money for food? No   Do you have housing? (Housing is defined as stable permanent housing and does not include staying outside in a car, in a tent, in an abandoned building, in an overnight shelter, or couch-surfing.) Yes   Are you worried about losing your housing? No   Lack of transportation? No   Unable to get utilities (heat,electricity)? No         5/15/2025   Fall Risk   Fallen 2 or more times in the past year? No   Trouble with walking or balance? No          5/15/2025    Activities of Daily Living- Home Safety   Needs help with the following daily activites None of the above   Safety concerns in the home None of the above         5/15/2025   Dental   Dentist two times every year? (!) DECLINE         5/15/2025   Hearing Screening   Hearing concerns? (!) I FEEL THAT PEOPLE ARE MUMBLING OR NOT SPEAKING CLEARLY.    (!) I NEED TO ASK PEOPLE TO SPEAK UP OR REPEAT THEMSELVES.       Multiple values from one day are sorted in reverse-chronological order         5/15/2025   Driving Risk Screening   Patient/family members have concerns about driving No         5/15/2025   General Alertness/Fatigue Screening   Have you been more tired than usual lately? No         5/15/2025   Urinary Incontinence Screening   Bothered by leaking urine in past 6 months No       Today's PHQ-9 Score:       5/15/2025     1:15 PM   PHQ-9 SCORE   PHQ-9 Total Score MyChart 4 (Minimal depression)   PHQ-9 Total Score 4        Patient-reported         5/15/2025   Substance Use   Alcohol more than 3/day or more than 7/wk Not Applicable   Do you have a current opioid prescription? No   How severe/bad is pain from 1 to 10? 4/10   Do you use any other substances recreationally? No     Social History     Tobacco Use    Smoking status: Every Day     Current packs/day: 1.00     Average packs/day: 1 pack/day for 47.4 years (47.4 ttl pk-yrs)     Types: Cigarettes     Start date: 1978    Smokeless tobacco: Never    Tobacco comments:     going to start nicotine patches   Vaping Use    Vaping status: Never Used   Substance Use Topics    Alcohol use: Not Currently    Drug use: No     {Provider  If there are gaps in the social history shown above, please follow the link to update and then refresh the note Link to Social and Substance History :509719}  Last PSA:   Prostate Specific Antigen Screen   Date Value Ref Range Status   10/25/2016 1.5 0.0 - 3.5 ng/mL Final     ASCVD Risk   The 10-year ASCVD risk score (Karen GORMAN, et  al., 2019) is: 25.9%    Values used to calculate the score:      Age: 65 years      Sex: Male      Is Non- : No      Diabetic: Yes      Tobacco smoker: Yes      Systolic Blood Pressure: 112 mmHg      Is BP treated: Yes      HDL Cholesterol: 47 mg/dL      Total Cholesterol: 132 mg/dL    {Link to Fracture Risk Assessment Tool (Optional):177374}    {Provider  REQUIRED FOR AWV Use the storyboard to review patient history, after sections have been marked as reviewed, refresh note to capture documentation:489544}  {Provider   REQUIRED AWV use this link to review and update sexual activity history  after section has been marked as reviewed, refresh note to capture documentation:875005}  Reviewed and updated as needed this visit by Provider                    {HISTORY OPTIONS (Optional):210635}  Current providers sharing in care for this patient include:  Patient Care Team:  Rosie Dorado NP as PCP - General  Rosie Dorado NP as Assigned PCP  Diane Strauss APRN CNP as Assigned Pulmonology Provider    The following health maintenance items are reviewed in Epic and correct as of today:  Health Maintenance   Topic Date Due    ADVANCE CARE PLANNING  Never done    COPD ACTION PLAN  Never done    RSV VACCINE (1 - Risk 60-74 years 1-dose series) Never done    ANNUAL REVIEW OF HM ORDERS  12/09/2022    MEDICARE ANNUAL WELLNESS VISIT  Never done    AORTIC ANEURYSM SCREENING (SYSTEM ASSIGNED)  Never done    DIABETIC FOOT EXAM  07/12/2024    COVID-19 Vaccine (3 - 2024-25 season) 09/01/2024    LUNG CANCER SCREENING  11/26/2024    A1C  03/11/2025    BMP  04/09/2025    EYE EXAM  08/18/2025    NICOTINE/TOBACCO CESSATION COUNSELING Q 1 YR  09/11/2025    LIPID  09/11/2025    MICROALBUMIN  09/11/2025    COLORECTAL CANCER SCREENING  03/08/2026    FALL RISK ASSESSMENT  05/15/2026    DTAP/TDAP/TD IMMUNIZATION (3 - Td or Tdap) 10/05/2030    SPIROMETRY  Completed    PHQ-2 (once per calendar year)  Completed    INFLUENZA  "VACCINE  Completed    Pneumococcal Vaccine: 50+ Years  Completed    ZOSTER IMMUNIZATION  Completed    HPV IMMUNIZATION  Aged Out    MENINGITIS IMMUNIZATION  Aged Out    HEPATITIS C SCREENING  Discontinued       {[facility] Picklists (Optional):196224}     Objective    Exam  /70   Pulse 68   Temp 98.7  F (37.1  C) (Oral)   Resp 24   Ht 1.69 m (5' 6.54\")   Wt 89.8 kg (198 lb)   SpO2 94%   BMI 31.45 kg/m     Estimated body mass index is 31.45 kg/m  as calculated from the following:    Height as of this encounter: 1.69 m (5' 6.54\").    Weight as of this encounter: 89.8 kg (198 lb).    Physical Exam  {Exam Choices (Optional):246766}        5/15/2025   Mini Cog   Clock Draw Score 2 Normal   3 Item Recall 3 objects recalled   Mini Cog Total Score 5     {A Mini-Cog total score of 0-2 suggests the possibility of dementia, score of 3-5 suggests no dementia:689863}    Vision Screen  Patient wears corrective lenses (select all that apply): Worn during vision screen  Vision Screen Results: Pass  {Provider  Link to Vision and Hearing Results :494018}    Signed Electronically by: Rosie Dorado NP  {Email feedback regarding this note to [email or web address]   :737824}  Answers submitted by the patient for this visit:  Patient Health Questionnaire (Submitted on 5/15/2025)  If you checked off any problems, how difficult have these problems made it for you to do your work, take care of things at home, or get along with other people?: Somewhat difficult  PHQ9 TOTAL SCORE: 4  Patient Health Questionnaire (G7) (Submitted on 5/15/2025)  ARIANA 7 TOTAL SCORE: 5    "

## 2025-05-16 ENCOUNTER — RESULTS FOLLOW-UP (OUTPATIENT)
Dept: INTERNAL MEDICINE | Facility: CLINIC | Age: 65
End: 2025-05-16

## 2025-05-16 LAB
ANION GAP SERPL CALCULATED.3IONS-SCNC: 12 MMOL/L (ref 7–15)
BUN SERPL-MCNC: 9.1 MG/DL (ref 8–23)
CALCIUM SERPL-MCNC: 9 MG/DL (ref 8.8–10.4)
CHLORIDE SERPL-SCNC: 107 MMOL/L (ref 98–107)
CREAT SERPL-MCNC: 0.73 MG/DL (ref 0.67–1.17)
EGFRCR SERPLBLD CKD-EPI 2021: >90 ML/MIN/1.73M2
GLUCOSE SERPL-MCNC: 82 MG/DL (ref 70–99)
HCO3 SERPL-SCNC: 22 MMOL/L (ref 22–29)
POTASSIUM SERPL-SCNC: 3.8 MMOL/L (ref 3.4–5.3)
PSA SERPL DL<=0.01 NG/ML-MCNC: 0.77 NG/ML (ref 0–4.5)
SODIUM SERPL-SCNC: 141 MMOL/L (ref 135–145)

## 2025-05-19 ENCOUNTER — PATIENT OUTREACH (OUTPATIENT)
Dept: CARE COORDINATION | Facility: CLINIC | Age: 65
End: 2025-05-19
Payer: COMMERCIAL

## 2025-05-19 LAB
A ALTERNATA IGE QN: <0.1 KU(A)/L
A FUMIGATUS IGE QN: <0.1 KU(A)/L
BERMUDA GRASS IGE QN: <0.1 KU(A)/L
C HERBARUM IGE QN: <0.1 KU(A)/L
CAT DANDER IGG QN: <0.1 KU(A)/L
CEDAR IGE QN: <0.1 KU(A)/L
COMMON RAGWEED IGE QN: <0.1 KU(A)/L
COTTONWOOD IGE QN: <0.1 KU(A)/L
D FARINAE IGE QN: <0.1 KU(A)/L
D PTERONYSS IGE QN: <0.1 KU(A)/L
DOG DANDER+EPITH IGE QN: 0.1 KU(A)/L
IGE SERPL-ACNC: 414 KU/L (ref 0–114)
MAPLE IGE QN: <0.1 KU(A)/L
MARSH ELDER IGE QN: <0.1 KU(A)/L
MOUSE URINE PROT IGE QN: <0.1 KU(A)/L
NETTLE IGE QN: <0.1 KU(A)/L
P NOTATUM IGE QN: <0.1 KU(A)/L
ROACH IGE QN: <0.1 KU(A)/L
SALTWORT IGE QN: <0.1 KU(A)/L
SILVER BIRCH IGE QN: <0.1 KU(A)/L
TIMOTHY IGE QN: <0.1 KU(A)/L
WHITE ASH IGE QN: <0.1 KU(A)/L
WHITE ELM IGE QN: <0.1 KU(A)/L
WHITE MULBERRY IGE QN: <0.1 KU(A)/L
WHITE OAK IGE QN: <0.1 KU(A)/L

## 2025-06-05 ENCOUNTER — TELEPHONE (OUTPATIENT)
Dept: PULMONOLOGY | Facility: CLINIC | Age: 65
End: 2025-06-05
Payer: COMMERCIAL

## 2025-06-05 NOTE — TELEPHONE ENCOUNTER
Per Diane Strauss CNP:    Your eosinophils are back to normal but your IgE is elevated. This would indicate an allergic or environmental trigger and could worsen breathing symptoms. You have a mild sensitivity to dogs, otherwise the allergy panel was normal.   I would recommend meeting with allergy or starting an oral medication called montelukast (Singulair) daily. This will help control that environmental response. Let me know what you think.        Spoke with Mahendra. He does not want to try the Singulair due to the side effect. He is due for a follow up appt. He does not have insurance right now. He will make an appt next month when his insurance is back.

## 2025-06-11 DIAGNOSIS — J43.9 PULMONARY EMPHYSEMA, UNSPECIFIED EMPHYSEMA TYPE (H): ICD-10-CM

## 2025-06-11 DIAGNOSIS — J44.9 CHRONIC OBSTRUCTIVE PULMONARY DISEASE, UNSPECIFIED COPD TYPE (H): ICD-10-CM

## 2025-06-11 RX ORDER — FLUTICASONE FUROATE, UMECLIDINIUM BROMIDE AND VILANTEROL TRIFENATATE 100; 62.5; 25 UG/1; UG/1; UG/1
1 POWDER RESPIRATORY (INHALATION) DAILY
Qty: 60 EACH | Refills: 1 | Status: SHIPPED | OUTPATIENT
Start: 2025-06-11

## 2025-06-11 NOTE — TELEPHONE ENCOUNTER
Received a fax from pharmacy, pt is now using [facility] for maintance medications-set up to authorize    Rukhsana Velarde LPN

## 2025-07-01 ENCOUNTER — TELEPHONE (OUTPATIENT)
Dept: PULMONOLOGY | Facility: CLINIC | Age: 65
End: 2025-07-01
Payer: COMMERCIAL

## 2025-07-01 DIAGNOSIS — J44.9 CHRONIC OBSTRUCTIVE PULMONARY DISEASE, UNSPECIFIED COPD TYPE (H): ICD-10-CM

## 2025-07-01 RX ORDER — FLUTICASONE FUROATE, UMECLIDINIUM BROMIDE AND VILANTEROL TRIFENATATE 100; 62.5; 25 UG/1; UG/1; UG/1
1 POWDER RESPIRATORY (INHALATION) DAILY
Qty: 60 EACH | Refills: 1 | Status: SHIPPED | OUTPATIENT
Start: 2025-07-01

## 2025-07-01 NOTE — TELEPHONE ENCOUNTER
Spoke with Mahendra. Remington was sent to [facility]. He reports they are not sure when they can send it. He requests it to be sent back to [facility]. Will send this now.

## 2025-07-01 NOTE — TELEPHONE ENCOUNTER
[facility] Call Center    Phone Message    May a detailed message be left on voicemail: yes     Reason for Call: Medication Question or concern regarding medication   Prescription Clarification    Name of Medication:   Fluticasone-Umeclidin-Vilant (TRELEGY ELLIPTA) 100-62.5-25 MCG/ACT oral inhaler     Prescribing Provider: Rica     Pharmacy:   [id number] [address] AT [facility] [address]        What on the order needs clarification? Patient states his prescription was canceled. Patient is wondering if he is not to take the medication and is wanting to get a call back to further discuss. Please advise.       Action Taken: Message routed to:  [facility] ([facility] Lung    Travel Screening: Not Applicable     Date of Service:

## 2025-07-16 DIAGNOSIS — J43.9 PULMONARY EMPHYSEMA, UNSPECIFIED EMPHYSEMA TYPE (H): ICD-10-CM

## 2025-07-16 RX ORDER — ALBUTEROL SULFATE 90 UG/1
1-2 INHALANT RESPIRATORY (INHALATION) EVERY 4 HOURS PRN
Qty: 18 G | Refills: 0 | Status: SHIPPED | OUTPATIENT
Start: 2025-07-16

## 2025-07-28 DIAGNOSIS — J43.9 PULMONARY EMPHYSEMA, UNSPECIFIED EMPHYSEMA TYPE (H): ICD-10-CM

## 2025-07-28 RX ORDER — ALBUTEROL SULFATE 90 UG/1
1-2 INHALANT RESPIRATORY (INHALATION) EVERY 4 HOURS PRN
Qty: 18 G | Refills: 0 | Status: CANCELLED | OUTPATIENT
Start: 2025-07-28

## 2025-07-28 NOTE — TELEPHONE ENCOUNTER
Pharmacy calling stating pt called pharmacy for a refill of albuterol inhaler.    Per chart review, pt was just prescribed this last inhaler 7/16/25. Attempted to call pt to ask about current symptoms and why he needs a new inhaler so soon.

## 2025-07-29 NOTE — TELEPHONE ENCOUNTER
Patient Returning Call    Reason for call:  Missed call     Information relayed to patient:      Mary Anne Yoo, RN   Registered Nurse     Telephone Encounter  Signed     Creation Time: 7/28/2025  1:47 PM     Pharmacy calling stating pt called pharmacy for a refill of albuterol inhaler.     Per chart review, pt was just prescribed this last inhaler 7/16/25. Attempted to call pt to ask about current symptoms and why he needs a new inhaler so soon.          Patient has additional questions:  No    -Patient received his inhaler yesterday from [facility]     Could we send this information to you in [facility] or would you prefer to receive a phone call?:   Patient would prefer a phone call   Okay to leave a detailed message?: N/A at Home number on file [phone] ([place])

## 2025-07-30 DIAGNOSIS — J44.1 COPD WITH ACUTE EXACERBATION (H): ICD-10-CM

## 2025-07-31 RX ORDER — ALBUTEROL SULFATE 0.83 MG/ML
SOLUTION RESPIRATORY (INHALATION)
Qty: 90 ML | Refills: 1 | Status: SHIPPED | OUTPATIENT
Start: 2025-07-31

## 2025-09-04 DIAGNOSIS — J44.9 CHRONIC OBSTRUCTIVE PULMONARY DISEASE, UNSPECIFIED COPD TYPE (H): ICD-10-CM

## 2025-09-04 DIAGNOSIS — E11.29 TYPE 2 DIABETES MELLITUS WITH MICROALBUMINURIA, WITHOUT LONG-TERM CURRENT USE OF INSULIN (H): ICD-10-CM

## 2025-09-04 DIAGNOSIS — R80.9 TYPE 2 DIABETES MELLITUS WITH MICROALBUMINURIA, WITHOUT LONG-TERM CURRENT USE OF INSULIN (H): ICD-10-CM

## 2025-09-04 RX ORDER — FLUTICASONE FUROATE, UMECLIDINIUM BROMIDE AND VILANTEROL TRIFENATATE 100; 62.5; 25 UG/1; UG/1; UG/1
1 POWDER RESPIRATORY (INHALATION) DAILY
Qty: 60 EACH | Refills: 1 | Status: SHIPPED | OUTPATIENT
Start: 2025-09-04

## 2025-09-04 RX ORDER — LISINOPRIL 2.5 MG/1
TABLET ORAL
Qty: 90 TABLET | Refills: 2 | Status: SHIPPED | OUTPATIENT
Start: 2025-09-04

## 2025-09-04 RX ORDER — GLIPIZIDE 10 MG/1
TABLET, FILM COATED, EXTENDED RELEASE ORAL
Qty: 90 TABLET | Refills: 2 | Status: SHIPPED | OUTPATIENT
Start: 2025-09-04